# Patient Record
Sex: FEMALE | Race: WHITE | Employment: OTHER | URBAN - METROPOLITAN AREA
[De-identification: names, ages, dates, MRNs, and addresses within clinical notes are randomized per-mention and may not be internally consistent; named-entity substitution may affect disease eponyms.]

---

## 2022-12-06 ENCOUNTER — HOSPITAL ENCOUNTER (OUTPATIENT)
Dept: GENERAL RADIOLOGY | Age: 62
Discharge: HOME | End: 2022-12-06
Payer: COMMERCIAL

## 2022-12-06 ENCOUNTER — HOSPITAL ENCOUNTER (OUTPATIENT)
Dept: WOUND CARE | Age: 62
Discharge: HOME | End: 2022-12-06
Payer: COMMERCIAL

## 2022-12-06 VITALS
HEART RATE: 111 BPM | RESPIRATION RATE: 16 BRPM | HEIGHT: 64 IN | TEMPERATURE: 97 F | DIASTOLIC BLOOD PRESSURE: 80 MMHG | WEIGHT: 246.91 LBS | SYSTOLIC BLOOD PRESSURE: 124 MMHG | BODY MASS INDEX: 42.15 KG/M2

## 2022-12-06 DIAGNOSIS — E11.621 DIABETIC ULCER OF TOE OF RIGHT FOOT ASSOCIATED WITH TYPE 2 DIABETES MELLITUS, WITH FAT LAYER EXPOSED (HCC): Primary | ICD-10-CM

## 2022-12-06 DIAGNOSIS — L97.512 DIABETIC ULCER OF TOE OF RIGHT FOOT ASSOCIATED WITH TYPE 2 DIABETES MELLITUS, WITH FAT LAYER EXPOSED (HCC): Primary | ICD-10-CM

## 2022-12-06 DIAGNOSIS — E11.621 DIABETIC ULCER OF TOE OF RIGHT FOOT ASSOCIATED WITH TYPE 2 DIABETES MELLITUS, WITH FAT LAYER EXPOSED (HCC): ICD-10-CM

## 2022-12-06 DIAGNOSIS — L97.512 DIABETIC ULCER OF TOE OF RIGHT FOOT ASSOCIATED WITH TYPE 2 DIABETES MELLITUS, WITH FAT LAYER EXPOSED (HCC): ICD-10-CM

## 2022-12-06 PROCEDURE — 73630 X-RAY EXAM OF FOOT: CPT

## 2022-12-06 PROCEDURE — 87186 SC STD MICRODIL/AGAR DIL: CPT

## 2022-12-06 PROCEDURE — 87205 SMEAR GRAM STAIN: CPT

## 2022-12-06 PROCEDURE — 99204 OFFICE O/P NEW MOD 45 MIN: CPT

## 2022-12-06 PROCEDURE — 11042 DBRDMT SUBQ TIS 1ST 20SQCM/<: CPT

## 2022-12-06 PROCEDURE — 87070 CULTURE OTHR SPECIMN AEROBIC: CPT

## 2022-12-06 PROCEDURE — 87077 CULTURE AEROBIC IDENTIFY: CPT

## 2022-12-06 RX ORDER — ASCORBIC ACID 500 MG
1000 TABLET ORAL DAILY
COMMUNITY

## 2022-12-06 RX ORDER — OMEGA-3S/DHA/EPA/FISH OIL/D3 300MG-1000
400 CAPSULE ORAL DAILY
COMMUNITY

## 2022-12-06 RX ORDER — ZINC GLUCONATE 50 MG
50 TABLET ORAL DAILY
COMMUNITY

## 2022-12-06 RX ORDER — AMOXICILLIN AND CLAVULANATE POTASSIUM 875; 125 MG/1; MG/1
1 TABLET, FILM COATED ORAL 2 TIMES DAILY
Qty: 28 TABLET | Refills: 0 | Status: SHIPPED | OUTPATIENT
Start: 2022-12-06 | End: 2022-12-20

## 2022-12-06 RX ORDER — MAGNESIUM 30 MG
30 TABLET ORAL DAILY
COMMUNITY

## 2022-12-06 RX ORDER — B-COMPLEX WITH VITAMIN C
1 TABLET ORAL DAILY
COMMUNITY

## 2022-12-06 RX ORDER — LIDOCAINE HYDROCHLORIDE 40 MG/ML
2.5 SOLUTION TOPICAL ONCE
Status: DISCONTINUED | OUTPATIENT
Start: 2022-12-06 | End: 2022-12-07 | Stop reason: HOSPADM

## 2022-12-06 NOTE — DISCHARGE INSTRUCTIONS
215 [facility] Physician Orders and Discharge Instructions  302 [facility]   [address] [address] [facility]  Telephone: [phone]  NAME:  Dano Lovett  YOB: 1960  MEDICAL RECORD NUMBER:  [id number]  DATE: 12/6/22     Return Appointment:  Return Appointment: With Dr Eunice Garrido  in  1 [facility])  [] Return Appointment for a Wound Assessment with the nurse on:     No future appointments. [address]: none  Medically necessary services: [] Skilled Nursing [] PT (Eval & Treat) [] OT (Eval & Treat) [] Social Work [] Dietician  [] Other:    Wound care instructions: If you smoke we ask that you refrain from smoking. Smoking inhibits wounds from healing. When taking antibiotics take the entire prescription as ordered. Do not stop taking until medication is all gone unless otherwise instructed. Exercise as tolerated. Keep weight off wounds and reposition every 2 hours if applicable. Do not get wounds wet in the bath or shower unless otherwise instructed by your physician. If your wound is on your foot or leg, you may purchase a cast bag. Please ask at the pharmacy. Wash hands with soap and water prior to and after every dressing change. [x]Wash wounds with: 0.9% normal saline  [x]Donna wound Topical Treatments: Do not apply lotions, creams, or ointments to the skin around the wound bed unless directed as followed:   Apply around the wound: Nothing         [x]Wound Location: Right Toes   Apply Primary Dressing to wound: Honey gel  Secondary Dressing: 2X2 gauze pad and Conforming roll gauze   Avoid contact of tape with skin if possible. When to change Dressing: Daily    []DME/Wound Dressing Supplies ordered from:  Please note, depending on your insurance coverage, you may have out-of-pocket expenses for these supplies.  If your out-of-pocket cost could be substantial, many companies have financial hardship programs for patients who qualify. If you have any questions about your supplies or your potential out-of-pocket costs, or if you need to place an order for a refill of supplies (typically monthly), please call the company directly. [x]Off Loading(Pressure Reduction) When: Walking  Weight Bearing Status: Partial Weight bearing Right:  Heel and Transfers Only;  Offloading devices: Ankle Foot Orthosis (I.e PRAFO): Right      Dietary:  Important dietary reminders:  1. Increase Protein intake (i.e. Lean meats, fish, eggs, legumes, and yogurt)  2. No added salt  3. If diabetic, follow a diabetic diet and check glucose prior to meals or as instructed by your physician. Dietary Supplements(Take twice a day unless instructed otherwise):  [] Lc Claw  [] 30ml ProStat [] Sandra Kast [] Ensure Max/Premier [] Other:    Your nurse  is:  Maeve Prince     Electronically signed by Valeria Haji RN on 12/6/2022 at 2:24 PM     [address] Information: Should you experience any significant changes in your wound(s) or have questions about your wound care, please contact the [address] [place] [address] at [phone]. We are open from 8:00am - 4:30p Monday, Thursday and Friday; 11:00am - 5pm on Tuesday and CLOSED Wednesday. Please give us 24-48 business hours to return your call. Call your doctor now or seek immediate medical care if:    You have symptoms of infection, such as: Increased pain, swelling, warmth, or redness. Red streaks leading from the area. Pus draining from the area. A fever.          [] Patient unable to sign Discharge Instructions given to ECF/Transportation/POA

## 2022-12-06 NOTE — PROGRESS NOTES
[address]   Progress Note and Procedure Note      Isa Haney  MEDICAL RECORD NUMBER:  [id number]  AGE: 65 y.o. GENDER: female  : 1960  EPISODE DATE:  2022    Subjective:     Chief Complaint   Patient presents with    Wound Check     Initial           HISTORY of PRESENT ILLNESS HPI     Isa Haney is a 65 y.o. female who presents today for wound/ulcer evaluation. History of Wound Context: Patient presents today with a new wound on there right 2nd and 3rd digits. She relates that they have been there for a couple of weeks and her  has been doing daily dressing changes that she had from a previous ulceration. Wound/Ulcer Pain Timing/Severity: none  Quality of pain: N/A  Severity:  0 / 10   Modifying Factors: None  Associated Signs/Symptoms: edema, erythema, and drainage    Ulcer Identification:  Ulcer Type: diabetic    Contributing Factors: diabetes    Acute Wound: N/A    PAST MEDICAL HISTORY        Diagnosis Date    Arthritis     Asthma     exercise/cold induced    Blindness, legal     CVA (cerebral vascular accident) ([facility] [address] 2015    Depression     Diabetes mellitus ([facility] [address]     ION (ischemic optic neuropathy)     developed post-op r/t hypotension    Osteomyelitis ([facility] [address] [facility]     Left foot; finished Vanco. end of .     TIA (transient ischemic attack)        PAST SURGICAL HISTORY    Past Surgical History:   Procedure Laterality Date    BACK SURGERY       SECTION      CHOLECYSTECTOMY      ELBOW SURGERY Left     FOOT SURGERY Left 2014    INCISION AND DRAINAGE MID FOOT LEFT, ENDOSCOPIC GASTROC    FOOT SURGERY Left 2014    LEFT FOOT EXTERNAL FIXATOR REMOVAL        HYSTERECTOMY (CERVIX STATUS UNKNOWN)      ORTHOPEDIC SURGERY      OTHER SURGICAL HISTORY Left 2014    INCISION AND DRAINAGE LEFT FOOT    SOCORRO AND BSO (CERVIX REMOVED)         FAMILY HISTORY    Family History   Problem Relation Age of Onset    Stroke Mother     Stroke Maternal Grandfather     Diabetes Paternal Grandmother        SOCIAL HISTORY    Social History     Tobacco Use    Smoking status: Never    Smokeless tobacco: Never   Substance Use Topics    Alcohol use: No    Drug use: No       ALLERGIES    Allergies   Allergen Reactions    Theophyllines Hives    Apixaban      Foggy and fatigued    Morphine Nausea And Vomiting       MEDICATIONS    Current Outpatient Medications on File Prior to Encounter   Medication Sig Dispense Refill    magnesium 30 MG tablet Take 30 mg by mouth daily 2 capsules      vitamin C (ASCORBIC ACID) 500 MG tablet Take 1,000 mg by mouth daily      zinc gluconate 50 MG tablet Take 50 mg by mouth daily      POTASSIUM CHLORIDE PO Take 1 each by mouth daily      B Complex Vitamins (VITAMIN B COMPLEX) TABS Take 1 each by mouth daily      vitamin D3 (CHOLECALCIFEROL) 10 MCG (400 UNIT) TABS tablet Take 400 Units by mouth daily      aspirin 325 MG tablet Take 325 mg by mouth daily      loratadine (CLARITIN) 10 MG tablet Take 1 tablet by mouth daily       No current facility-administered medications on file prior to encounter. REVIEW OF SYSTEMS  Review of Systems    Pertinent items are noted in HPI. Review of Systems: A 12 point review of symptoms is unremarkable with the exception of the chief complaint. Patient specifically denies nausea, fever, vomiting, chills, shortness of breath, chest pain, abdominal pain, constipation or difficulty urinating. Objective:      /80   Pulse (!) 111   Temp 97 °F (36.1 °C) (Temporal)   Resp 16   Ht 5' 4\" (1.626 m)   Wt 246 lb 14.6 oz (112 kg)   BMI 42.38 kg/m²     Wt Readings from Last 3 Encounters:   12/06/22 246 lb 14.6 oz (112 kg)   12/01/15 221 lb (100.2 kg)   09/29/15 222 lb (100.7 kg)       PHYSICAL EXAM  Physical Exam    Patient has a palpable dorsalis pedis pulse noted bilaterally. Posterior tibial pulses palpable bilaterally. Sparse hair growth is noted.   Brawny pigmentary changes noted on the bilateral lower extremities from chronic venous disease. Multiple varicose veins noted. Protective sensation as tested with a monofilament is absent at all sites tested bilaterally. Ulceration noted on the plantar aspect at the level of the PIPJ on the second and third digits. There is erythema noted surrounding these ulcerations with a scant amount of purulent drainage noted coming from the second. The digits are swollen and warm to the touch. There is no fluctuance or crepitus noted. 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 It is stable with no hypermobility noted. 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 The ulcerations correspond to the AFO plate that was likely digging into her skin the AFO does not fit anymore due to progression of her contracture. Assessment:        Problem List Items Addressed This Visit    None  Visit Diagnoses       Diabetic ulcer of toe of right foot associated with type 2 diabetes mellitus, with fat layer exposed ([address]    -  Primary    Relevant Orders    Basic Metabolic Panel    CBC    C-Reactive Protein    Hemoglobin A1C    Prealbumin    Sedimentation Rate    XR FOOT RIGHT (MIN 3 VIEWS)             Procedure Note  Indications:  Based on my examination of this patient's wound(s)/ulcer(s) today, debridement is required to promote healing and evaluate the wound base. Performed by: Tony Reno DPM    Consent obtained:  Yes    Time out taken:  Yes    Pain Control: Anesthetic  Anesthetic: 4% Lidocaine Liquid Topical       Debridement: Excisional Debridement    Using curette the wound(s)/ulcer(s) was/were debrided down through and including the removal of epidermis, dermis, subcutaneous tissue, and muscle/fascia.         Devitalized Tissue Debrided:  fibrin and slough    Pre Debridement Measurements:  Are located in the [place]  Documentation Flow Sheet    Diabetic/Pressure/Non Pressure Ulcers only:  Ulcer: Diabetic ulcer, muscle necrosis     Wound/Ulcer #: 1 and 2    Post Debridement Measurements:  Wound/Ulcer Descriptions are Pre Debridement except measurements:    Wound 12/06/22 Toe (Comment  which one) Right;Plantar #1 second toe (Active)   Wound Image   12/06/22 1348   Wound Etiology Diabetic Valiente 2 12/06/22 1423   Wound Cleansed Cleansed with saline 12/06/22 1348   Dressing/Treatment Honey gel/honey paste 12/06/22 1444   Offloading for Diabetic Foot Ulcers Offloading ordered;Post op shoe 12/06/22 1348   Wound Length (cm) 1 cm 12/06/22 1348   Wound Width (cm) 1 cm 12/06/22 1348   Wound Depth (cm) 0.1 cm 12/06/22 1348   Wound Surface Area (cm^2) 1 cm^2 12/06/22 1348   Wound Volume (cm^3) 0.1 cm^3 12/06/22 1348   Post-Procedure Length (cm) 1.1 cm 12/06/22 1423   Post-Procedure Width (cm) 1.1 cm 12/06/22 1423   Post-Procedure Depth (cm) 0.3 cm 12/06/22 1423   Post-Procedure Surface Area (cm^2) 1.21 cm^2 12/06/22 1423   Post-Procedure Volume (cm^3) 0.363 cm^3 12/06/22 1423   Tunneling Position ___ O'Clock 0 12/06/22 1348   Undermining Maxium Distance (cm) 0 12/06/22 1348   Wound Assessment Slough;Pink/red 12/06/22 1348   Drainage Amount Small 12/06/22 1348   Drainage Description Yellow 12/06/22 1348   Odor None 12/06/22 1348   Donna-wound Assessment Maceration 12/06/22 1348   Margins Defined edges 12/06/22 1348   Wound Thickness Description not for Pressure Injury Full thickness 12/06/22 1348   Number of days: 0       Wound 12/06/22 Toe (Comment  which one) Plantar;Right #2 third toe (Active)   Wound Image   12/06/22 1348   Wound Etiology Diabetic Valiente 2 12/06/22 1423   Wound Cleansed Cleansed with saline 12/06/22 1348   Dressing/Treatment Honey gel/honey paste 12/06/22 1444   Wound Length (cm) 0.7 cm 12/06/22 1348   Wound Width (cm) 0.5 cm 12/06/22 1348   Wound Depth (cm) 0.5 cm 12/06/22 1348   Wound Surface Area (cm^2) 0.35 cm^2 12/06/22 1348   Wound Volume (cm^3) 0.175 cm^3 12/06/22 1348 Post-Procedure Length (cm) 0.8 cm 12/06/22 1423   Post-Procedure Width (cm) 0.6 cm 12/06/22 1423   Post-Procedure Depth (cm) 0.7 cm 12/06/22 1423   Post-Procedure Surface Area (cm^2) 0.48 cm^2 12/06/22 1423   Post-Procedure Volume (cm^3) 0.336 cm^3 12/06/22 1423   Tunneling Position ___ O'Clock 0 12/06/22 1348   Undermining Maxium Distance (cm) 0 12/06/22 1348   Wound Assessment Pink/red;Slough 12/06/22 1348   Drainage Amount Small 12/06/22 1348   Drainage Description Serosanguinous 12/06/22 1348   Odor None 12/06/22 1348   Donna-wound Assessment Maceration 12/06/22 1348   Margins Defined edges 12/06/22 1348   Wound Thickness Description not for Pressure Injury Full thickness 12/06/22 1348   Number of days: 0          Total Surface Area Debrided:  1.69 sq cm     Estimated Blood Loss:  Minimal    Hemostasis Achieved:  by pressure    Procedural Pain:  0  / 10     Post Procedural Pain:  0 / 10     Response to treatment:  Well tolerated by patient. Plan:   E/M x30 minutes of a new problem of Valiente grade 3 ulceration right second and third digits with cellulitis. A culture was taken from the wound. Patient was started on Augmentin 875 1 p.o. twice daily. Will adjust antibiotics based on culture results. Patient be sent for x-rays and blood work as the ulcerations are concerning for possible osteomyelitis due to the associated cellulitis in depth. Patient was instructed to perform daily dressing changes with Medihoney. Patient was instructed to discontinue utilizing her AFO and will ambulate in a surgical shoe and a walker until ulcerations are healed. Patient will need a new AFO as they are likely the cause of the neuropathic ulcerations    Treatment Note please see attached Discharge Instructions    Written patient dismissal instructions given to patient and signed by patient or POA.         Reappoint 1 week for follow-up    Discharge [address] [facility] Physician Orders and Discharge Instructions  [address]  Telephone: [phone]  NAME:  Almarie Runner  YOB: 1960  MEDICAL RECORD NUMBER:  [id number]  DATE: 12/6/22     Return Appointment:  Return Appointment: With Dr Chandan Morfin  in  1 [facility])  [] Return Appointment for a Wound Assessment with the nurse on:     No future appointments. [address]: none  Medically necessary services: [] Skilled Nursing [] PT (Eval & Treat) [] OT (Eval & Treat) [] Social Work [] Dietician  [] Other:    Wound care instructions: If you smoke we ask that you refrain from smoking. Smoking inhibits wounds from healing. When taking antibiotics take the entire prescription as ordered. Do not stop taking until medication is all gone unless otherwise instructed. Exercise as tolerated. Keep weight off wounds and reposition every 2 hours if applicable. Do not get wounds wet in the bath or shower unless otherwise instructed by your physician. If your wound is on your foot or leg, you may purchase a cast bag. Please ask at the pharmacy. Wash hands with soap and water prior to and after every dressing change. [x]Wash wounds with: 0.9% normal saline  [x]Donna wound Topical Treatments: Do not apply lotions, creams, or ointments to the skin around the wound bed unless directed as followed:   Apply around the wound: Nothing         [x]Wound Location: Right Toes   Apply Primary Dressing to wound: Honey gel  Secondary Dressing: 2X2 gauze pad and Conforming roll gauze   Avoid contact of tape with skin if possible. When to change Dressing: Daily    []DME/Wound Dressing Supplies ordered from:  Please note, depending on your insurance coverage, you may have out-of-pocket expenses for these supplies. If your out-of-pocket cost could be substantial, many companies have financial hardship programs for patients who qualify.  If you have any questions about your supplies or your potential out-of-pocket costs, or if you need to place an order for a refill of supplies (typically monthly), please call the company directly. [x]Off Loading(Pressure Reduction) When: Walking  Weight Bearing Status: Partial Weight bearing Right:  Heel and Transfers Only;  Offloading devices: Ankle Foot Orthosis (I.e PRAFO): Right      Dietary:  Important dietary reminders:  1. Increase Protein intake (i.e. Lean meats, fish, eggs, legumes, and yogurt)  2. No added salt  3. If diabetic, follow a diabetic diet and check glucose prior to meals or as instructed by your physician. Dietary Supplements(Take twice a day unless instructed otherwise):  [] Sean Revering  [] 30ml ProStat [] Luc Fulling [] Ensure Max/Premier [] Other:    Your nurse  is:  Alysia     Electronically signed by Sammy Bateman RN on 12/6/2022 at 2:24 PM     [facility] Information: Should you experience any significant changes in your wound(s) or have questions about your wound care, please contact the [address] [place] [address] at [phone]. We are open from 8:00am - 4:30p Monday, Thursday and Friday; 11:00am - 5pm on Tuesday and CLOSED Wednesday. Please give us 24-48 business hours to return your call. Call your doctor now or seek immediate medical care if:    You have symptoms of infection, such as: Increased pain, swelling, warmth, or redness. Red streaks leading from the area. Pus draining from the area. A fever.          [] Patient unable to sign Discharge Instructions given to ECF/Transportation/POA          Electronically signed by Praneeth Bloom DPM on 12/6/2022 at 3:47 PM

## 2022-12-07 LAB
ANION GAP SERPL CALCULATED.3IONS-SCNC: 19 MMOL/L (ref 3–16)
BUN BLDV-MCNC: 18 MG/DL (ref 7–20)
C-REACTIVE PROTEIN: 9.6 MG/L (ref 0–5.1)
CALCIUM SERPL-MCNC: 9.9 MG/DL (ref 8.3–10.6)
CHLORIDE BLD-SCNC: 102 MMOL/L (ref 99–110)
CO2: 20 MMOL/L (ref 21–32)
CREAT SERPL-MCNC: 0.7 MG/DL (ref 0.6–1.2)
ESTIMATED AVERAGE GLUCOSE: 203 MG/DL
GFR SERPL CREATININE-BSD FRML MDRD: >60 ML/MIN/{1.73_M2}
GLUCOSE BLD-MCNC: 260 MG/DL (ref 70–99)
HBA1C MFR BLD: 8.7 %
HCT VFR BLD CALC: 44.6 % (ref 36–48)
HEMOGLOBIN: 14.4 G/DL (ref 12–16)
MCH RBC QN AUTO: 27.8 PG (ref 26–34)
MCHC RBC AUTO-ENTMCNC: 32.3 G/DL (ref 31–36)
MCV RBC AUTO: 86 FL (ref 80–100)
PDW BLD-RTO: 13.1 % (ref 12.4–15.4)
PLATELET # BLD: 284 K/UL (ref 135–450)
PMV BLD AUTO: 8.4 FL (ref 5–10.5)
POTASSIUM SERPL-SCNC: 4.4 MMOL/L (ref 3.5–5.1)
PREALBUMIN: 20.1 MG/DL (ref 20–40)
RBC # BLD: 5.18 M/UL (ref 4–5.2)
SEDIMENTATION RATE, ERYTHROCYTE: 39 MM/HR (ref 0–30)
SODIUM BLD-SCNC: 141 MMOL/L (ref 136–145)
WBC # BLD: 8 K/UL (ref 4–11)

## 2022-12-08 PROBLEM — L97.512 DIABETIC ULCER OF TOE OF RIGHT FOOT ASSOCIATED WITH TYPE 2 DIABETES MELLITUS, WITH FAT LAYER EXPOSED (HCC): Status: ACTIVE | Noted: 2022-12-08

## 2022-12-08 PROBLEM — E11.621 DIABETIC ULCER OF TOE OF RIGHT FOOT ASSOCIATED WITH TYPE 2 DIABETES MELLITUS, WITH FAT LAYER EXPOSED (HCC): Status: ACTIVE | Noted: 2022-12-08

## 2022-12-08 RX ORDER — CLOBETASOL PROPIONATE 0.5 MG/G
OINTMENT TOPICAL ONCE
OUTPATIENT
Start: 2022-12-08 | End: 2022-12-08

## 2022-12-08 RX ORDER — LIDOCAINE 40 MG/G
CREAM TOPICAL ONCE
OUTPATIENT
Start: 2022-12-08 | End: 2022-12-08

## 2022-12-08 RX ORDER — BACITRACIN ZINC AND POLYMYXIN B SULFATE 500; 1000 [USP'U]/G; [USP'U]/G
OINTMENT TOPICAL ONCE
OUTPATIENT
Start: 2022-12-08 | End: 2022-12-08

## 2022-12-08 RX ORDER — LIDOCAINE HYDROCHLORIDE 20 MG/ML
JELLY TOPICAL ONCE
OUTPATIENT
Start: 2022-12-08 | End: 2022-12-08

## 2022-12-08 RX ORDER — GENTAMICIN SULFATE 1 MG/G
OINTMENT TOPICAL ONCE
OUTPATIENT
Start: 2022-12-08 | End: 2022-12-08

## 2022-12-08 RX ORDER — BACITRACIN, NEOMYCIN, POLYMYXIN B 400; 3.5; 5 [USP'U]/G; MG/G; [USP'U]/G
OINTMENT TOPICAL ONCE
OUTPATIENT
Start: 2022-12-08 | End: 2022-12-08

## 2022-12-08 RX ORDER — LIDOCAINE 50 MG/G
OINTMENT TOPICAL ONCE
OUTPATIENT
Start: 2022-12-08 | End: 2022-12-08

## 2022-12-08 RX ORDER — BETAMETHASONE DIPROPIONATE 0.05 %
OINTMENT (GRAM) TOPICAL ONCE
OUTPATIENT
Start: 2022-12-08 | End: 2022-12-08

## 2022-12-08 RX ORDER — GINSENG 100 MG
CAPSULE ORAL ONCE
OUTPATIENT
Start: 2022-12-08 | End: 2022-12-08

## 2022-12-08 RX ORDER — LIDOCAINE HYDROCHLORIDE 40 MG/ML
SOLUTION TOPICAL ONCE
OUTPATIENT
Start: 2022-12-08 | End: 2022-12-08

## 2022-12-09 LAB
GRAM STAIN RESULT: ABNORMAL
ORGANISM: ABNORMAL
WOUND/ABSCESS: ABNORMAL

## 2022-12-13 ENCOUNTER — HOSPITAL ENCOUNTER (OUTPATIENT)
Dept: WOUND CARE | Age: 62
Discharge: HOME | End: 2022-12-13
Payer: COMMERCIAL

## 2022-12-13 VITALS
RESPIRATION RATE: 18 BRPM | SYSTOLIC BLOOD PRESSURE: 112 MMHG | HEART RATE: 109 BPM | DIASTOLIC BLOOD PRESSURE: 88 MMHG | TEMPERATURE: 97.5 F

## 2022-12-13 DIAGNOSIS — E11.628 DIABETIC INFECTION OF RIGHT FOOT (HCC): Primary | ICD-10-CM

## 2022-12-13 DIAGNOSIS — L97.512 DIABETIC ULCER OF TOE OF RIGHT FOOT ASSOCIATED WITH TYPE 2 DIABETES MELLITUS, WITH FAT LAYER EXPOSED (HCC): ICD-10-CM

## 2022-12-13 DIAGNOSIS — L08.9 DIABETIC INFECTION OF RIGHT FOOT (HCC): Primary | ICD-10-CM

## 2022-12-13 DIAGNOSIS — E11.621 DIABETIC ULCER OF TOE OF RIGHT FOOT ASSOCIATED WITH TYPE 2 DIABETES MELLITUS, WITH FAT LAYER EXPOSED (HCC): ICD-10-CM

## 2022-12-13 PROCEDURE — 6370000000 HC RX 637 (ALT 250 FOR IP): Performed by: PODIATRIST

## 2022-12-13 PROCEDURE — 11043 DBRDMT MUSC&/FSCA 1ST 20/<: CPT

## 2022-12-13 RX ORDER — BETAMETHASONE DIPROPIONATE 0.05 %
OINTMENT (GRAM) TOPICAL ONCE
OUTPATIENT
Start: 2022-12-13 | End: 2022-12-13

## 2022-12-13 RX ORDER — CLOBETASOL PROPIONATE 0.5 MG/G
OINTMENT TOPICAL ONCE
OUTPATIENT
Start: 2022-12-13 | End: 2022-12-13

## 2022-12-13 RX ORDER — BACITRACIN, NEOMYCIN, POLYMYXIN B 400; 3.5; 5 [USP'U]/G; MG/G; [USP'U]/G
OINTMENT TOPICAL ONCE
OUTPATIENT
Start: 2022-12-13 | End: 2022-12-13

## 2022-12-13 RX ORDER — LIDOCAINE 50 MG/G
OINTMENT TOPICAL ONCE
OUTPATIENT
Start: 2022-12-13 | End: 2022-12-13

## 2022-12-13 RX ORDER — LEVOFLOXACIN 500 MG/1
500 TABLET, FILM COATED ORAL DAILY
Qty: 14 TABLET | Refills: 0 | Status: SHIPPED | OUTPATIENT
Start: 2022-12-13 | End: 2022-12-27

## 2022-12-13 RX ORDER — GINSENG 100 MG
CAPSULE ORAL ONCE
OUTPATIENT
Start: 2022-12-13 | End: 2022-12-13

## 2022-12-13 RX ORDER — GENTAMICIN SULFATE 1 MG/G
OINTMENT TOPICAL ONCE
OUTPATIENT
Start: 2022-12-13 | End: 2022-12-13

## 2022-12-13 RX ORDER — LIDOCAINE HYDROCHLORIDE 20 MG/ML
JELLY TOPICAL ONCE
OUTPATIENT
Start: 2022-12-13 | End: 2022-12-13

## 2022-12-13 RX ORDER — LIDOCAINE HYDROCHLORIDE 40 MG/ML
SOLUTION TOPICAL ONCE
Status: COMPLETED | OUTPATIENT
Start: 2022-12-13 | End: 2022-12-13

## 2022-12-13 RX ORDER — BACITRACIN ZINC AND POLYMYXIN B SULFATE 500; 1000 [USP'U]/G; [USP'U]/G
OINTMENT TOPICAL ONCE
OUTPATIENT
Start: 2022-12-13 | End: 2022-12-13

## 2022-12-13 RX ORDER — LIDOCAINE 40 MG/G
CREAM TOPICAL ONCE
OUTPATIENT
Start: 2022-12-13 | End: 2022-12-13

## 2022-12-13 RX ORDER — LIDOCAINE HYDROCHLORIDE 40 MG/ML
SOLUTION TOPICAL ONCE
OUTPATIENT
Start: 2022-12-13 | End: 2022-12-13

## 2022-12-13 RX ADMIN — LIDOCAINE HYDROCHLORIDE: 40 SOLUTION TOPICAL at 14:10

## 2022-12-13 NOTE — DISCHARGE INSTRUCTIONS
215 [facility] Physician Orders and Discharge Instructions  [address] [place] [address] [address] [facility]  Telephone: [phone]  NAME:  Sergei Batista  YOB: 1960  MEDICAL RECORD NUMBER:  [id number]  DATE: 12/13/22     Return Appointment:  Return Appointment: With  Sav Isabel DPM or Esperanza Guido DPM   in  1 [facility])  [] Return Appointment for a Wound Assessment with the nurse on:     No future appointments. [address]: none  Medically necessary services:        [] Skilled Nursing       [] PT (Eval & Treat)   [] OT (Eval & Treat)       [] Social Work           [] Dietician  [] Other:     Wound care instructions: If you smoke we ask that you refrain from smoking. Smoking inhibits wounds from healing. When taking antibiotics take the entire prescription as ordered. Do not stop taking until medication is all gone unless otherwise instructed. Exercise as tolerated. Keep weight off wounds and reposition every 2 hours if applicable. Do not get wounds wet in the bath or shower unless otherwise instructed by your physician. If your wound is on your foot or leg, you may purchase a cast bag. Please ask at the pharmacy. Wash hands with soap and water prior to and after every dressing change. [x]Wash wounds with: 0.9% normal saline  [x]Donna wound Topical Treatments: Do not apply lotions, creams, or ointments to the skin around the wound bed unless directed as followed:   Apply around the wound: Nothing                    [x]Wound Location: Right Toes        Apply Primary Dressing to wound: Honey gel  Secondary Dressing: 2X2 gauze pad and Conforming roll gauze              Avoid contact of tape with skin if possible. When to change Dressing: Daily     []DME/Wound Dressing Supplies ordered from:  Please note, depending on your insurance coverage, you may have out-of-pocket expenses for these supplies.  If your out-of-pocket cost could be substantial, many companies have financial hardship programs for patients who qualify. If you have any questions about your supplies or your potential out-of-pocket costs, or if you need to place an order for a refill of supplies (typically monthly), please call the company directly. [x]Off Loading(Pressure Reduction) When: Walking  Weight Bearing Status: Partial Weight bearing Right:  Heel and Transfers Only;  Offloading devices: Surgical Shoe Right foot, Walker, Scooter    Dietary:  Important dietary reminders:  1. Increase Protein intake (i.e. Lean meats, fish, eggs, legumes, and yogurt)  2. No added salt  3. If diabetic, follow a diabetic diet and check glucose prior to meals or as instructed by your physician. Dietary Supplements(Take twice a day unless instructed otherwise):  [] Liang Crafts  [] 30ml ProStat [] [place] [] Ensure Max/Premier [] Other:    Your nurse  is:  Natan Alba     Electronically signed by Kelly Babb RN on 12/13/2022 at 2:37 PM     215 [facility] Information: Should you experience any significant changes in your wound(s) or have questions about your wound care, please contact the [address] [place] [address] at [phone]. We are open from 8:00am - 4:30p Monday, Thursday and Friday; 11:00am - 5pm on Tuesday and CLOSED Wednesday. Please give us 24-48 business hours to return your call. Call your doctor now or seek immediate medical care if:    You have symptoms of infection, such as: Increased pain, swelling, warmth, or redness. Red streaks leading from the area. Pus draining from the area. A fever.          [] Patient unable to sign Discharge Instructions given to ECF/Transportation/POA

## 2022-12-13 NOTE — PROGRESS NOTES
[address]   Progress Note and Procedure Note      Marcos Maki  MEDICAL RECORD NUMBER:  [id number]  AGE: 65 y.o. GENDER: female  : 1960  EPISODE DATE:  2022    Subjective:     Chief Complaint   Patient presents with    Wound Check     RIGHT FOOT         HISTORY of PRESENT ILLNESS HPI     Marcos Maki is a 65 y.o. female who presents today for wound/ulcer evaluation. History of Wound Context: Patient presents today with a new wound on there right 2nd and 3rd digits. She relates that they have been there for a couple of weeks and her  has been doing daily dressing changes that she had from a previous ulceration. Patient admits she quit taking her antibiotics due to GI upset and she has not been wearing her surgical shoe as recommended. Wound/Ulcer Pain Timing/Severity: none  Quality of pain: N/A  Severity:  0 / 10   Modifying Factors: None  Associated Signs/Symptoms: edema, erythema, and drainage    Ulcer Identification:  Ulcer Type: diabetic    Contributing Factors: diabetes    Acute Wound: N/A    PAST MEDICAL HISTORY        Diagnosis Date    Arthritis     Asthma     exercise/cold induced    Blindness, legal     CVA (cerebral vascular accident) ([address] 2015    Depression     Diabetes mellitus ([address]     ION (ischemic optic neuropathy)     developed post-op r/t hypotension    Osteomyelitis ([address]     Left foot; finished V[place]. end of .     TIA (transient ischemic attack)        PAST SURGICAL HISTORY    Past Surgical History:   Procedure Laterality Date    BACK SURGERY       SECTION      CHOLECYSTECTOMY      ELBOW SURGERY Left     FOOT SURGERY Left 2014    INCISION AND DRAINAGE MID FOOT LEFT, ENDOSCOPIC GASTROC    FOOT SURGERY Left 2014    LEFT FOOT EXTERNAL FIXATOR REMOVAL        HYSTERECTOMY (CERVIX STATUS UNKNOWN)      ORTHOPEDIC SURGERY      OTHER SURGICAL HISTORY Left 2014    INCISION AND DRAINAGE LEFT FOOT SOCORRO AND BSO (CERVIX REMOVED)         FAMILY HISTORY    Family History   Problem Relation Age of Onset    Stroke Mother     Stroke Maternal Grandfather     Diabetes Paternal Grandmother        SOCIAL HISTORY    Social History     Tobacco Use    Smoking status: Never    Smokeless tobacco: Never   Substance Use Topics    Alcohol use: No    Drug use: No       ALLERGIES    Allergies   Allergen Reactions    Theophyllines Hives    Apixaban      Foggy and fatigued    Morphine Nausea And Vomiting       MEDICATIONS    Current Outpatient Medications on File Prior to Encounter   Medication Sig Dispense Refill    POTASSIUM CHLORIDE PO Take 1 each by mouth daily      B Complex Vitamins (VITAMIN B COMPLEX) TABS Take 1 each by mouth daily      vitamin D3 (CHOLECALCIFEROL) 10 MCG (400 UNIT) TABS tablet Take 400 Units by mouth daily      magnesium 30 MG tablet Take 30 mg by mouth daily 2 capsules      vitamin C (ASCORBIC ACID) 500 MG tablet Take 1,000 mg by mouth daily      zinc gluconate 50 MG tablet Take 50 mg by mouth daily      aspirin 325 MG tablet Take 325 mg by mouth daily      loratadine (CLARITIN) 10 MG tablet Take 1 tablet by mouth daily       No current facility-administered medications on file prior to encounter. REVIEW OF SYSTEMS  Review of Systems    Pertinent items are noted in HPI. Review of Systems: A 12 point review of symptoms is unremarkable with the exception of the chief complaint. Patient specifically denies nausea, fever, vomiting, chills, shortness of breath, chest pain, abdominal pain, constipation or difficulty urinating. Objective:      /88   Pulse (!) 109   Temp 97.5 °F (36.4 °C) (Temporal)   Resp 18     Wt Readings from Last 3 Encounters:   12/06/22 246 lb 14.6 oz (112 kg)   12/01/15 221 lb (100.2 kg)   09/29/15 222 lb (100.7 kg)       PHYSICAL EXAM  Physical Exam    Patient has a palpable dorsalis pedis pulse noted bilaterally. Posterior tibial pulses palpable bilaterally.   Sparse hair growth is noted. Brawny pigmentary changes noted on the bilateral lower extremities from chronic venous disease. Multiple varicose veins noted. Protective sensation as tested with a monofilament is absent at all sites tested bilaterally. Ulceration noted on the plantar aspect at the level of the PIPJ on the second and third digits. There is erythema noted surrounding these ulcerations with a scant amount of purulent drainage noted coming from the second. The digits are swollen and warm to the touch. There is no fluctuance or crepitus noted. 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 It is stable with no hypermobility noted. 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 The ulcerations correspond to the AFO plate that was likely digging into her skin the AFO does not fit anymore due to progression of her contracture. Assessment:        Problem List Items Addressed This Visit       Diabetic infection of right foot ([address] - Primary    Relevant Orders    Initiate Outpatient Wound Care Protocol    Diabetic ulcer of toe of right foot associated with type 2 diabetes mellitus, with fat layer exposed ([address]    Relevant Orders    Initiate Outpatient Wound Care Protocol        Procedure Note  Indications:  Based on my examination of this patient's wound(s)/ulcer(s) today, debridement is required to promote healing and evaluate the wound base. Performed by: Letty Canela DPM    Consent obtained:  Yes    Time out taken:  Yes    Pain Control: Anesthetic  Anesthetic: 4% Lidocaine Liquid Topical       Debridement: Excisional Debridement    Using curette the wound(s)/ulcer(s) was/were debrided down through and including the removal of epidermis, dermis, subcutaneous tissue, and muscle/fascia.         Devitalized Tissue Debrided:  fibrin and slough    Pre Debridement Measurements:  Are located in the [place]  Documentation Flow Sheet    Diabetic/Pressure/Non Pressure Ulcers only:  Ulcer: Diabetic ulcer, muscle necrosis     Wound/Ulcer #: 1 and 2    Post Debridement Measurements:  Wound/Ulcer Descriptions are Pre Debridement except measurements:    Wound 12/06/22 Toe (Comment  which one) Right;Plantar #1 second toe (Active)   Wound Image   12/06/22 1348   Wound Etiology Diabetic Valiente 3 12/06/22 1423   Wound Cleansed Cleansed with saline 12/13/22 1357   Dressing/Treatment Honey gel/honey paste 12/13/22 1447   Offloading for Diabetic Foot Ulcers Offloading ordered;Post op shoe 12/13/22 1447   Wound Length (cm) 1 cm 12/13/22 1357   Wound Width (cm) 0.9 cm 12/13/22 1357   Wound Depth (cm) 0.3 cm 12/13/22 1357   Wound Surface Area (cm^2) 0.9 cm^2 12/13/22 1357   Change in Wound Size % (l*w) 10 12/13/22 1357   Wound Volume (cm^3) 0.27 cm^3 12/13/22 1357   Wound Healing % -170 12/13/22 1357   Post-Procedure Length (cm) 1.1 cm 12/06/22 1423   Post-Procedure Width (cm) 1.1 cm 12/06/22 1423   Post-Procedure Depth (cm) 0.3 cm 12/06/22 1423   Post-Procedure Surface Area (cm^2) 1.21 cm^2 12/06/22 1423   Post-Procedure Volume (cm^3) 0.363 cm^3 12/06/22 1423   Distance Tunneling (cm) 0 cm 12/13/22 1357   Tunneling Position ___ O'Clock 0 12/13/22 1357   Undermining Starts ___ O'Clock 0 12/13/22 1357   Undermining Ends___ O'Clock 0 12/13/22 1357   Undermining Maxium Distance (cm) 0 12/13/22 1357   Wound Assessment Exposed structure bone;Pink/red 12/13/22 1357   Drainage Amount Small 12/13/22 1357   Drainage Description Serosanguinous 12/13/22 1357   Odor None 12/13/22 1357   Donna-wound Assessment Intact 12/13/22 1357   Margins Defined edges 12/13/22 1357   Wound Thickness Description not for Pressure Injury Full thickness 12/13/22 1357   Number of days: 7       Wound 12/06/22 Toe (Comment  which one) Plantar;Right #2 third toe (Active)   Wound Image   12/06/22 1348   Wound Etiology Diabetic Valiente 2 12/06/22 1423   Wound Cleansed Cleansed with saline 12/13/22 1357   Dressing/Treatment Honey gel/honey paste 12/13/22 1447   Wound Length (cm) 1 cm 12/13/22 1357   Wound Width (cm) 0.8 cm 12/13/22 1357   Wound Depth (cm) 0.2 cm 12/13/22 1357   Wound Surface Area (cm^2) 0.8 cm^2 12/13/22 1357   Change in Wound Size % (l*w) -128.57 12/13/22 1357   Wound Volume (cm^3) 0.16 cm^3 12/13/22 1357   Wound Healing % 9 12/13/22 1357   Post-Procedure Length (cm) 0.8 cm 12/06/22 1423   Post-Procedure Width (cm) 0.6 cm 12/06/22 1423   Post-Procedure Depth (cm) 0.7 cm 12/06/22 1423   Post-Procedure Surface Area (cm^2) 0.48 cm^2 12/06/22 1423   Post-Procedure Volume (cm^3) 0.336 cm^3 12/06/22 1423   Distance Tunneling (cm) 0 cm 12/13/22 1357   Tunneling Position ___ O'Clock 0 12/13/22 1357   Undermining Starts ___ O'Clock 0 12/13/22 1357   Undermining Ends___ O'Clock 0 12/13/22 1357   Undermining Maxium Distance (cm) 0 12/13/22 1357   Wound Assessment Fibrin;Pink/red 12/13/22 1357   Drainage Amount Small 12/13/22 1357   Drainage Description Serosanguinous 12/13/22 1357   Odor None 12/13/22 1357   Donna-wound Assessment Intact 12/13/22 1357   Margins Defined edges 12/13/22 1357   Wound Thickness Description not for Pressure Injury Full thickness 12/13/22 1357   Number of days: 7          Total Surface Area Debrided:  1.69 sq cm     Estimated Blood Loss:  Minimal    Hemostasis Achieved:  by pressure    Procedural Pain:  0  / 10     Post Procedural Pain:  0 / 10     Response to treatment:  Well tolerated by patient. Plan:   E/M x30 minutes of a new problem of Valiente grade 3 ulceration right second and third digits with cellulitis. Patient did not tolerate Augmentin. Rx levofloxacin No. 14 1 p.o. daily x14 days. X-rays and blood work were reviewed. There was no evidence of osteomyelitis at this time    Patient was [facility] perform [facility]     Patient was instructed to discontinue utilizing her AFO and will ambulate in a surgical shoe and a walker until ulcerations are healed. Patient will need a new AFO as they are likely the cause of the neuropathic ulcerations. Discussed with patient that if she has difficulty ambulating in her surgical shoe she can use her motorized scooter for most of her ambulation. She still needs to wear the surgical shoe to prevent bending at the digits to offload the area    Treatment Note please see attached Discharge Instructions    Written patient dismissal instructions given to patient and signed by patient or POA. Reappoint 1 week for follow-up    Discharge [address] [facility] Physician Orders and Discharge Instructions  [address] [address] [address] [place] [address]  Telephone: [phone]  NAME:  Lorenzo Gould  YOB: 1960  MEDICAL RECORD NUMBER:  [id number]  DATE: 12/13/22     Return Appointment:  Return Appointment: With  Edwige Melton DPM or Ortencia Meigs, DPM   in  [facility])  [] Return Appointment for a Wound Assessment with the nurse on:     No future appointments. [address]: none  Medically necessary services:        [] Skilled Nursing       [] PT (Eval & Treat)   [] OT (Eval & Treat)       [] Social Work           [] Dietician  [] Other:     Wound care instructions: If you smoke we ask that you refrain from smoking. Smoking inhibits wounds from healing. When taking antibiotics take the entire prescription as ordered. Do not stop taking until medication is all gone unless otherwise instructed. Exercise as tolerated. Keep weight off wounds and reposition every 2 hours if applicable. Do not get wounds wet in the bath or shower unless otherwise instructed by your physician. If your wound is on your foot or leg, you may purchase a cast bag. Please ask at the pharmacy. Wash hands with soap and water prior to and after every dressing change.      [x]Wash wounds with: 0.9% normal saline  [x]Donna wound Topical Treatments: Do not apply lotions, creams, or ointments to the skin around the wound bed unless directed as followed:   Apply around the wound: Nothing                    [x]Wound Location: Right Toes        Apply Primary Dressing to wound: Honey gel  Secondary Dressing: 2X2 gauze pad and Conforming roll gauze              Avoid contact of tape with skin if possible. When to change Dressing: Daily     []DME/Wound Dressing Supplies ordered from:  Please note, depending on your insurance coverage, you may have out-of-pocket expenses for these supplies. If your out-of-pocket cost could be substantial, many companies have financial hardship programs for patients who qualify. If you have any questions about your supplies or your potential out-of-pocket costs, or if you need to place an order for a refill of supplies (typically monthly), please call the company directly. [x]Off Loading(Pressure Reduction) When: Walking  Weight Bearing Status: Partial Weight bearing Right:  Heel and Transfers Only;  Offloading devices: Surgical Shoe Right foot, Walker, Scooter    Dietary:  Important dietary reminders:  1. Increase Protein intake (i.e. Lean meats, fish, eggs, legumes, and yogurt)  2. No added salt  3. If diabetic, follow a diabetic diet and check glucose prior to meals or as instructed by your physician. Dietary Supplements(Take twice a day unless instructed otherwise):  [] Naseem Mott  [] 30ml ProStat [] Vladimir Yeager [] Ensure Max/Premier [] Other:    Your nurse  is:  Pretty Pineda     Electronically signed by Vladimir Hylton RN on 12/13/2022 at 2:37 PM     [address] [place] [address] Information: Should you experience any significant changes in your wound(s) or have questions about your wound care, please contact the [address] [place] [address] at [phone]. We are open from 8:00am - 4:30p Monday, Thursday and Friday; 11:00am - 5pm on Tuesday and CLOSED Wednesday.  Please give us 24-48 business hours to

## 2022-12-22 ENCOUNTER — HOSPITAL ENCOUNTER (OUTPATIENT)
Dept: WOUND CARE | Age: 62
Discharge: HOME | End: 2022-12-22
Payer: COMMERCIAL

## 2022-12-22 VITALS
SYSTOLIC BLOOD PRESSURE: 152 MMHG | HEART RATE: 102 BPM | TEMPERATURE: 97.5 F | DIASTOLIC BLOOD PRESSURE: 84 MMHG | RESPIRATION RATE: 18 BRPM

## 2022-12-22 DIAGNOSIS — L97.512 DIABETIC ULCER OF TOE OF RIGHT FOOT ASSOCIATED WITH TYPE 2 DIABETES MELLITUS, WITH FAT LAYER EXPOSED (HCC): ICD-10-CM

## 2022-12-22 DIAGNOSIS — E11.628 DIABETIC INFECTION OF RIGHT FOOT (HCC): Primary | ICD-10-CM

## 2022-12-22 DIAGNOSIS — E11.621 DIABETIC ULCER OF TOE OF RIGHT FOOT ASSOCIATED WITH TYPE 2 DIABETES MELLITUS, WITH FAT LAYER EXPOSED (HCC): ICD-10-CM

## 2022-12-22 DIAGNOSIS — L08.9 DIABETIC INFECTION OF RIGHT FOOT (HCC): Primary | ICD-10-CM

## 2022-12-22 PROCEDURE — 6370000000 HC RX 637 (ALT 250 FOR IP): Performed by: PODIATRIST

## 2022-12-22 RX ORDER — BACITRACIN ZINC AND POLYMYXIN B SULFATE 500; 1000 [USP'U]/G; [USP'U]/G
OINTMENT TOPICAL ONCE
OUTPATIENT
Start: 2022-12-22 | End: 2022-12-22

## 2022-12-22 RX ORDER — LIDOCAINE 40 MG/G
CREAM TOPICAL ONCE
OUTPATIENT
Start: 2022-12-22 | End: 2022-12-22

## 2022-12-22 RX ORDER — BETAMETHASONE DIPROPIONATE 0.05 %
OINTMENT (GRAM) TOPICAL ONCE
OUTPATIENT
Start: 2022-12-22 | End: 2022-12-22

## 2022-12-22 RX ORDER — CLOBETASOL PROPIONATE 0.5 MG/G
OINTMENT TOPICAL ONCE
OUTPATIENT
Start: 2022-12-22 | End: 2022-12-22

## 2022-12-22 RX ORDER — LIDOCAINE HYDROCHLORIDE 20 MG/ML
JELLY TOPICAL ONCE
OUTPATIENT
Start: 2022-12-22 | End: 2022-12-22

## 2022-12-22 RX ORDER — GINSENG 100 MG
CAPSULE ORAL ONCE
OUTPATIENT
Start: 2022-12-22 | End: 2022-12-22

## 2022-12-22 RX ORDER — LIDOCAINE HYDROCHLORIDE 40 MG/ML
SOLUTION TOPICAL ONCE
OUTPATIENT
Start: 2022-12-22 | End: 2022-12-22

## 2022-12-22 RX ORDER — LIDOCAINE 50 MG/G
OINTMENT TOPICAL ONCE
OUTPATIENT
Start: 2022-12-22 | End: 2022-12-22

## 2022-12-22 RX ORDER — LIDOCAINE HYDROCHLORIDE 40 MG/ML
SOLUTION TOPICAL ONCE
Status: COMPLETED | OUTPATIENT
Start: 2022-12-22 | End: 2022-12-22

## 2022-12-22 RX ORDER — BACITRACIN, NEOMYCIN, POLYMYXIN B 400; 3.5; 5 [USP'U]/G; MG/G; [USP'U]/G
OINTMENT TOPICAL ONCE
OUTPATIENT
Start: 2022-12-22 | End: 2022-12-22

## 2022-12-22 RX ORDER — GENTAMICIN SULFATE 1 MG/G
OINTMENT TOPICAL ONCE
OUTPATIENT
Start: 2022-12-22 | End: 2022-12-22

## 2022-12-22 RX ADMIN — COLLAGENASE SANTYL: 250 OINTMENT TOPICAL at 14:03

## 2022-12-22 RX ADMIN — LIDOCAINE HYDROCHLORIDE: 40 SOLUTION TOPICAL at 13:48

## 2022-12-22 NOTE — PROGRESS NOTES
[address]   Progress Note and Procedure Note      Marylu Mejia  MEDICAL RECORD NUMBER:  [id number]  AGE: 65 y.o. GENDER: female  : 1960  EPISODE DATE:  2022    Subjective:     Chief Complaint   Patient presents with    Wound Check     Left foot         HISTORY of PRESENT ILLNESS HPI     Marylu Mejia is a 65 y.o. female who presents today for wound/ulcer evaluation. History of Wound Context: Patient presents today with wounds on there right 2nd and 3rd digits. She relates that they have been there for a couple of weeks and her  has been doing daily dressing changes that she had from a previous ulceration. Patient relates she has been using Santyl as instructed. Wound/Ulcer Pain Timing/Severity: none  Quality of pain: N/A  Severity:  0 / 10   Modifying Factors: None  Associated Signs/Symptoms: edema, erythema, and drainage    Ulcer Identification:  Ulcer Type: diabetic    Contributing Factors: diabetes    Acute Wound: N/A    PAST MEDICAL HISTORY        Diagnosis Date    Arthritis     Asthma     exercise/cold induced    Blindness, legal     CVA (cerebral vascular accident) ([address] 2015    Depression     Diabetes mellitus ([address]     ION (ischemic optic neuropathy)     developed post-op r/t hypotension    Osteomyelitis ([address]     Left foot; finished Vanco. end of .     TIA (transient ischemic attack)        PAST SURGICAL HISTORY    Past Surgical History:   Procedure Laterality Date    BACK SURGERY       SECTION      CHOLECYSTECTOMY      ELBOW SURGERY Left     FOOT SURGERY Left 2014    INCISION AND DRAINAGE MID FOOT LEFT, ENDOSCOPIC GASTROC    FOOT SURGERY Left 2014    LEFT FOOT EXTERNAL FIXATOR REMOVAL        HYSTERECTOMY (CERVIX STATUS UNKNOWN)      ORTHOPEDIC SURGERY      OTHER SURGICAL HISTORY Left 2014    INCISION AND DRAINAGE LEFT FOOT    SOCORRO AND BSO (CERVIX REMOVED)         FAMILY HISTORY    Family History Problem Relation Age of Onset    Stroke Mother     Stroke Maternal Grandfather     Diabetes Paternal Grandmother        SOCIAL HISTORY    Social History     Tobacco Use    Smoking status: Never    Smokeless tobacco: Never   Substance Use Topics    Alcohol use: No    Drug use: No       ALLERGIES    Allergies   Allergen Reactions    Theophyllines Hives    Apixaban      Foggy and fatigued    Morphine Nausea And Vomiting       MEDICATIONS    Current Outpatient Medications on File Prior to Encounter   Medication Sig Dispense Refill    levoFLOXacin (LEVAQUIN) 500 MG tablet Take 1 tablet by mouth daily for 14 days 14 tablet 0    POTASSIUM CHLORIDE PO Take 1 each by mouth daily      B Complex Vitamins (VITAMIN B COMPLEX) TABS Take 1 each by mouth daily      vitamin D3 (CHOLECALCIFEROL) 10 MCG (400 UNIT) TABS tablet Take 400 Units by mouth daily      magnesium 30 MG tablet Take 30 mg by mouth daily 2 capsules      vitamin C (ASCORBIC ACID) 500 MG tablet Take 1,000 mg by mouth daily      zinc gluconate 50 MG tablet Take 50 mg by mouth daily      aspirin 325 MG tablet Take 325 mg by mouth daily      loratadine (CLARITIN) 10 MG tablet Take 1 tablet by mouth daily       No current facility-administered medications on file prior to encounter. REVIEW OF SYSTEMS  Review of Systems    Pertinent items are noted in HPI. Review of Systems: A 12 point review of symptoms is unremarkable with the exception of the chief complaint. Patient specifically denies nausea, fever, vomiting, chills, shortness of breath, chest pain, abdominal pain, constipation or difficulty urinating. Objective:      BP (!) 152/84   Pulse (!) 102   Temp 97.5 °F (36.4 °C) (Temporal)   Resp 18     Wt Readings from Last 3 Encounters:   12/06/22 246 lb 14.6 oz (112 kg)   12/01/15 221 lb (100.2 kg)   09/29/15 222 lb (100.7 kg)       PHYSICAL EXAM  Physical Exam    Patient has a palpable dorsalis pedis pulse noted bilaterally.   Posterior tibial pulses palpable bilaterally. Sparse hair growth is noted. Brawny pigmentary changes noted on the bilateral lower extremities from chronic venous disease. Multiple varicose veins noted. Protective sensation as tested with a monofilament is absent at all sites tested bilaterally. Ulceration noted on the plantar aspect at the level of the PIPJ on the second and third digits. There is erythema noted surrounding these ulcerations with a scant amount of purulent drainage noted coming from the second. The digits are swollen and warm to the touch. There is no fluctuance or crepitus noted. 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 It is stable with no hypermobility noted. 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 The ulcerations correspond to the AFO plate that was likely digging into her skin the AFO does not fit anymore due to progression of her contracture. Assessment:        Problem List Items Addressed This Visit          Endocrine    Diabetic infection of right foot ([address] - Primary    Relevant Orders    Initiate Outpatient Wound Care Protocol    Diabetic ulcer of toe of right foot associated with type 2 diabetes mellitus, with fat layer exposed ([address]    Relevant Orders    Initiate Outpatient Wound Care Protocol        Procedure Note  Indications:  Based on my examination of this patient's wound(s)/ulcer(s) today, debridement is required to promote healing and evaluate the wound base. Performed by: Berta Barreto DPM    Consent obtained:  Yes    Time out taken:  Yes    Pain Control: Anesthetic  Anesthetic: 4% Lidocaine Liquid Topical       Debridement: Excisional Debridement    Using curette the wound(s)/ulcer(s) was/were debrided down through and including the removal of epidermis, dermis, subcutaneous tissue, and muscle/fascia.         Devitalized Tissue Debrided:  fibrin and slough    Pre Debridement Measurements:  Are located in the Wound/Ulcer Documentation Flow Sheet    Diabetic/Pressure/Non Pressure Ulcers only:  Ulcer: Diabetic ulcer, muscle necrosis     Wound/Ulcer #: 1 and 2    Post Debridement Measurements:  Wound/Ulcer Descriptions are Pre Debridement except measurements:    Wound 12/06/22 Toe (Comment  which one) Right;Plantar #1 second toe (Active)   Wound Image   12/06/22 1348   Wound Etiology Diabetic Valiente 3 12/06/22 1423   Wound Cleansed Cleansed with saline 12/22/22 1341   Dressing/Treatment Pharmaceutical agent (see MAR) 12/22/22 1359   Offloading for Diabetic Foot Ulcers Offloading ordered;Post op shoe 12/22/22 1359   Wound Length (cm) 0.4 cm 12/22/22 1341   Wound Width (cm) 0.4 cm 12/22/22 1341   Wound Depth (cm) 0.3 cm 12/22/22 1341   Wound Surface Area (cm^2) 0.16 cm^2 12/22/22 1341   Change in Wound Size % (l*w) 84 12/22/22 1341   Wound Volume (cm^3) 0.048 cm^3 12/22/22 1341   Wound Healing % 52 12/22/22 1341   Post-Procedure Length (cm) 0.5 cm 12/22/22 1359   Post-Procedure Width (cm) 0.5 cm 12/22/22 1359   Post-Procedure Depth (cm) 0.5 cm 12/22/22 1359   Post-Procedure Surface Area (cm^2) 0.25 cm^2 12/22/22 1359   Post-Procedure Volume (cm^3) 0.125 cm^3 12/22/22 1359   Distance Tunneling (cm) 0 cm 12/22/22 1341   Tunneling Position ___ O'Clock 0 12/22/22 1341   Undermining Starts ___ O'Clock 0 12/22/22 1341   Undermining Ends___ O'Clock 0 12/22/22 1341   Undermining Maxium Distance (cm) 0 12/22/22 1341   Wound Assessment Fibrin;Pink/red 12/22/22 1341   Drainage Amount Small 12/22/22 1341   Drainage Description Serosanguinous 12/22/22 1341   Odor None 12/22/22 1341   Donna-wound Assessment Intact 12/22/22 1341   Margins Defined edges 12/22/22 1341   Wound Thickness Description not for Pressure Injury Full thickness 12/22/22 1341   Number of days: 16       Wound 12/06/22 Toe (Comment  which one) Plantar;Right #2 third toe (Active)   Wound Image   12/06/22 1348   Wound Etiology Diabetic Valiente 2 12/06/22 1423   Wound Cleansed Cleansed with saline 12/22/22 1341   Dressing/Treatment Pharmaceutical agent (see MAR) 12/22/22 1359   Wound Length (cm) 0.7 cm 12/22/22 1341   Wound Width (cm) 0.5 cm 12/22/22 1341   Wound Depth (cm) 0.2 cm 12/22/22 1341   Wound Surface Area (cm^2) 0.35 cm^2 12/22/22 1341   Change in Wound Size % (l*w) 0 12/22/22 1341   Wound Volume (cm^3) 0.07 cm^3 12/22/22 1341   Wound Healing % 60 12/22/22 1341   Post-Procedure Length (cm) 0.8 cm 12/22/22 1359   Post-Procedure Width (cm) 0.6 cm 12/22/22 1359   Post-Procedure Depth (cm) 0.4 cm 12/22/22 1359   Post-Procedure Surface Area (cm^2) 0.48 cm^2 12/22/22 1359   Post-Procedure Volume (cm^3) 0.192 cm^3 12/22/22 1359   Distance Tunneling (cm) 0 cm 12/22/22 1341   Tunneling Position ___ O'Clock 0 12/22/22 1341   Undermining Starts ___ O'Clock 0 12/22/22 1341   Undermining Ends___ O'Clock 0 12/22/22 1341   Undermining Maxium Distance (cm) 0 12/22/22 1341   Wound Assessment Fibrin;Pink/red 12/22/22 1341   Drainage Amount Small 12/22/22 1341   Drainage Description Serosanguinous 12/22/22 1341   Odor None 12/22/22 1341   Donna-wound Assessment Intact 12/22/22 1341   Margins Defined edges 12/22/22 1341   Wound Thickness Description not for Pressure Injury Full thickness 12/22/22 1341   Number of days: 16          Total Surface Area Debrided:  0.60 sq cm     Estimated Blood Loss:  Minimal    Hemostasis Achieved:  by pressure    Procedural Pain:  0  / 10     Post Procedural Pain:  0 / 10     Response to treatment:  Well tolerated by patient. Plan:   E/M x30 minutes of a new problem of Valiente grade 3 ulceration right second and third digits with resolved cellulitis. X-rays and blood work were reviewed. There was no evidence of osteomyelitis at this time    Patient was instructed to perform daily dressing changes with Santyl. Patient was instructed to discontinue utilizing her AFO and will ambulate in a surgical shoe and a walker until ulcerations are healed. Patient will need a new AFO as they are likely the cause of the neuropathic ulcerations. Discussed with patient that if she has difficulty ambulating in her surgical shoe she can use her motorized scooter for most of her ambulation. She still needs to wear the surgical shoe to prevent bending at the digits to offload the area    Treatment Note please see attached Discharge Instructions    Written patient dismissal instructions given to patient and signed by patient or POA. Reappoint 1 week for follow-up    Discharge [facility] Physician Orders and Discharge Instructions  [address] [place] [address] [address]  Telephone: [phone]  NAME:  Saeed Eldridge  YOB: 1960  MEDICAL RECORD NUMBER:  [id number]  DATE: 12/22/22     Return Appointment:  Return Appointment: With Dr Favio Fitzptarick  in  1 [facility])  [] Return Appointment for a Wound Assessment with the nurse on:     No future appointments. [address]: none  Medically necessary services: [] Skilled Nursing [] PT (Eval & Treat) [] OT (Eval & Treat) [] Social Work [] Dietician  [] Other:    Wound care instructions: If you smoke we ask that you refrain from smoking. Smoking inhibits wounds from healing. When taking antibiotics take the entire prescription as ordered. Do not stop taking until medication is all gone unless otherwise instructed. Exercise as tolerated. Keep weight off wounds and reposition every 2 hours if applicable. Do not get wounds wet in the bath or shower unless otherwise instructed by your physician. If your wound is on your foot or leg, you may purchase a cast bag. Please ask at the pharmacy. Wash hands with soap and water prior to and after every dressing change.     [x]Wash wounds with: 0.9% normal saline  [x]Donna wound Topical Treatments: Do not apply lotions, creams, or ointments to the skin around the wound bed unless directed as followed:   Apply around the wound: Nothing         [x]Wound Location: right 2,3 toes   Apply Primary Dressing to wound: Honey gel or santyl  Secondary Dressing: 2X2 gauze pad and Conforming roll gauze   Avoid contact of tape with skin if possible. When to change Dressing: Daily      []DME/Wound Dressing Supplies ordered from:  Please note, depending on your insurance coverage, you may have out-of-pocket expenses for these supplies. If your out-of-pocket cost could be substantial, many companies have financial hardship programs for patients who qualify. If you have any questions about your supplies or your potential out-of-pocket costs, or if you need to place an order for a refill of supplies (typically monthly), please call the company directly. [x] Edema Control:  Apply:    Elevate leg(s) above the level of the heart for 30 minutes 4-5 times a day and/or when sitting. Avoid prolonged standing in one place. [x]Off Loading(Pressure Reduction) When: Walking  Weight Bearing Status: Partial Weight bearing Right:   ;  Offloading devices: surgical shoe, walker, scooter    Dietary:  Important dietary reminders:  1. Increase Protein intake (i.e. Lean meats, fish, eggs, legumes, and yogurt)  2. No added salt  3. If diabetic, follow a diabetic diet and check glucose prior to meals or as instructed by your physician. Dietary Supplements(Take twice a day unless instructed otherwise):  [] Bridgman Do  [] 30ml ProStat [] Nancye Karen [] Ensure Max/Premier [] Other:    Your nurse  is:  Dayna Quintanilla       Electronically signed by Carrie Gar RN on 12/22/2022 at 2:02 PM     [address]: Should you experience any significant changes in your wound(s) or have questions about your wound care, please contact the [address] [place] [address] at [phone]. We are open from 8:00am - 4:30p Monday, Thursday and Friday; 11:00am - 5pm on Tuesday and CLOSED Wednesday.  Please give us 24-48 business hours to return your call. Call your doctor now or seek immediate medical care if:    You have symptoms of infection, such as: Increased pain, swelling, warmth, or redness. Red streaks leading from the area. Pus draining from the area. A fever.          [] Patient unable to sign Discharge Instructions given to ECF/Transportation/POA            Electronically signed by Chele Bertrand DPM on 12/22/2022 at 2:17 PM

## 2022-12-22 NOTE — DISCHARGE INSTRUCTIONS
[facility] Physician Orders and Discharge Instructions  [address] [address] [address]  Telephone: [phone]  NAME:  Wilda Thomas  YOB: 1960  MEDICAL RECORD NUMBER:  [id number]  DATE: 12/22/22     Return Appointment:  Return Appointment: With Dr Sada Paniagua  in  1 [facility])  [] Return Appointment for a Wound Assessment with the nurse on:     No future appointments. [address]: none  Medically necessary services: [] Skilled Nursing [] PT (Eval & Treat) [] OT (Eval & Treat) [] Social Work [] Dietician  [] Other:    Wound care instructions: If you smoke we ask that you refrain from smoking. Smoking inhibits wounds from healing. When taking antibiotics take the entire prescription as ordered. Do not stop taking until medication is all gone unless otherwise instructed. Exercise as tolerated. Keep weight off wounds and reposition every 2 hours if applicable. Do not get wounds wet in the bath or shower unless otherwise instructed by your physician. If your wound is on your foot or leg, you may purchase a cast bag. Please ask at the pharmacy. Wash hands with soap and water prior to and after every dressing change. [x]Wash wounds with: 0.9% normal saline  [x]Donna wound Topical Treatments: Do not apply lotions, creams, or ointments to the skin around the wound bed unless directed as followed:   Apply around the wound: Nothing         [x]Wound Location: right 2,3 toes   Apply Primary Dressing to wound: Honey gel or santyl  Secondary Dressing: 2X2 gauze pad and Conforming roll gauze   Avoid contact of tape with skin if possible. When to change Dressing: Daily      []DME/Wound Dressing Supplies ordered from:  Please note, depending on your insurance coverage, you may have out-of-pocket expenses for these supplies.  If your out-of-pocket cost could be substantial, many companies have financial hardship programs for patients who qualify. If you have any questions about your supplies or your potential out-of-pocket costs, or if you need to place an order for a refill of supplies (typically monthly), please call the company directly. [x] Edema Control:  Apply:    Elevate leg(s) above the level of the heart for 30 minutes 4-5 times a day and/or when sitting. Avoid prolonged standing in one place. [x]Off Loading(Pressure Reduction) When: Walking  Weight Bearing Status: Partial Weight bearing Right:   ;  Offloading devices: surgical shoe, walker, scooter    Dietary:  Important dietary reminders:  1. Increase Protein intake (i.e. Lean meats, fish, eggs, legumes, and yogurt)  2. No added salt  3. If diabetic, follow a diabetic diet and check glucose prior to meals or as instructed by your physician. Dietary Supplements(Take twice a day unless instructed otherwise):  [] Gaynelle Donald  [] 30ml ProStat [] Jacquelin Grave [] Ensure Max/Premier [] Other:    Your nurse  is:  Jessenia Heaton       Electronically signed by Tony Gusman RN on 12/22/2022 at 2:02 PM     [address]: Should you experience any significant changes in your wound(s) or have questions about your wound care, please contact the [address] [place] [address] at [phone]. We are open from 8:00am - 4:30p Monday, Thursday and Friday; 11:00am - 5pm on Tuesday and CLOSED Wednesday. Please give us 24-48 business hours to return your call. Call your doctor now or seek immediate medical care if:    You have symptoms of infection, such as: Increased pain, swelling, warmth, or redness. Red streaks leading from the area. Pus draining from the area. A fever.          [] Patient unable to sign Discharge Instructions given to ECF/Transportation/POA

## 2022-12-29 ENCOUNTER — HOSPITAL ENCOUNTER (OUTPATIENT)
Dept: WOUND CARE | Age: 62
Discharge: HOME | End: 2022-12-29
Payer: COMMERCIAL

## 2022-12-29 VITALS
TEMPERATURE: 97.3 F | SYSTOLIC BLOOD PRESSURE: 147 MMHG | RESPIRATION RATE: 18 BRPM | HEART RATE: 106 BPM | DIASTOLIC BLOOD PRESSURE: 85 MMHG

## 2022-12-29 DIAGNOSIS — E11.628 DIABETIC INFECTION OF RIGHT FOOT (HCC): Primary | ICD-10-CM

## 2022-12-29 DIAGNOSIS — E11.621 DIABETIC ULCER OF TOE OF RIGHT FOOT ASSOCIATED WITH TYPE 2 DIABETES MELLITUS, WITH FAT LAYER EXPOSED (HCC): ICD-10-CM

## 2022-12-29 DIAGNOSIS — L08.9 DIABETIC INFECTION OF RIGHT FOOT (HCC): Primary | ICD-10-CM

## 2022-12-29 DIAGNOSIS — L97.512 DIABETIC ULCER OF TOE OF RIGHT FOOT ASSOCIATED WITH TYPE 2 DIABETES MELLITUS, WITH FAT LAYER EXPOSED (HCC): ICD-10-CM

## 2022-12-29 PROCEDURE — 6370000000 HC RX 637 (ALT 250 FOR IP): Performed by: PODIATRIST

## 2022-12-29 PROCEDURE — 11042 DBRDMT SUBQ TIS 1ST 20SQCM/<: CPT

## 2022-12-29 RX ORDER — LIDOCAINE 50 MG/G
OINTMENT TOPICAL ONCE
OUTPATIENT
Start: 2022-12-29 | End: 2022-12-29

## 2022-12-29 RX ORDER — BACITRACIN, NEOMYCIN, POLYMYXIN B 400; 3.5; 5 [USP'U]/G; MG/G; [USP'U]/G
OINTMENT TOPICAL ONCE
OUTPATIENT
Start: 2022-12-29 | End: 2022-12-29

## 2022-12-29 RX ORDER — LIDOCAINE HYDROCHLORIDE 20 MG/ML
JELLY TOPICAL ONCE
OUTPATIENT
Start: 2022-12-29 | End: 2022-12-29

## 2022-12-29 RX ORDER — GINSENG 100 MG
CAPSULE ORAL ONCE
OUTPATIENT
Start: 2022-12-29 | End: 2022-12-29

## 2022-12-29 RX ORDER — CLOBETASOL PROPIONATE 0.5 MG/G
OINTMENT TOPICAL ONCE
OUTPATIENT
Start: 2022-12-29 | End: 2022-12-29

## 2022-12-29 RX ORDER — BETAMETHASONE DIPROPIONATE 0.05 %
OINTMENT (GRAM) TOPICAL ONCE
OUTPATIENT
Start: 2022-12-29 | End: 2022-12-29

## 2022-12-29 RX ORDER — BACITRACIN ZINC AND POLYMYXIN B SULFATE 500; 1000 [USP'U]/G; [USP'U]/G
OINTMENT TOPICAL ONCE
OUTPATIENT
Start: 2022-12-29 | End: 2022-12-29

## 2022-12-29 RX ORDER — GENTAMICIN SULFATE 1 MG/G
OINTMENT TOPICAL ONCE
OUTPATIENT
Start: 2022-12-29 | End: 2022-12-29

## 2022-12-29 RX ORDER — LIDOCAINE HYDROCHLORIDE 40 MG/ML
SOLUTION TOPICAL ONCE
Status: COMPLETED | OUTPATIENT
Start: 2022-12-29 | End: 2022-12-29

## 2022-12-29 RX ORDER — LIDOCAINE 40 MG/G
CREAM TOPICAL ONCE
OUTPATIENT
Start: 2022-12-29 | End: 2022-12-29

## 2022-12-29 RX ORDER — LIDOCAINE HYDROCHLORIDE 40 MG/ML
SOLUTION TOPICAL ONCE
OUTPATIENT
Start: 2022-12-29 | End: 2022-12-29

## 2022-12-29 RX ADMIN — LIDOCAINE HYDROCHLORIDE: 40 SOLUTION TOPICAL at 13:32

## 2022-12-29 RX ADMIN — COLLAGENASE SANTYL: 250 OINTMENT TOPICAL at 13:57

## 2022-12-29 NOTE — DISCHARGE INSTRUCTIONS
[facility] Physician Orders and Discharge Instructions  [address] [address] [address]  Telephone: [phone]  NAME:  Kunal Mckeon  YOB: 1960  MEDICAL RECORD NUMBER:  [id number]  DATE: 12/29/22     Return Appointment:  Return Appointment: With Dr Alvino Ashton  in  1 [facility])  [] Return Appointment for a Wound Assessment with the nurse on:     Future Appointments   Date Time Provider Ruddy Chong   1/5/2023  1:15 PM Chele Bertrand  [address]: none    Medically necessary services: [] Skilled Nursing [] PT (Eval & Treat) [] OT (Eval & Treat) [] Social Work [] Dietician  [] Other:    Wound care instructions: If you smoke we ask that you refrain from smoking. Smoking inhibits wounds from healing. When taking antibiotics take the entire prescription as ordered. Do not stop taking until medication is all gone unless otherwise instructed. Exercise as tolerated. Keep weight off wounds and reposition every 2 hours if applicable. Do not get wounds wet in the bath or shower unless otherwise instructed by your physician. If your wound is on your foot or leg, you may purchase a cast bag. Please ask at the pharmacy. Wash hands with soap and water prior to and after every dressing change. [x]Wash wounds with: 0.9% normal saline  [x]Donna wound Topical Treatments: Do not apply lotions, creams, or ointments to the skin around the wound bed unless directed as followed:   Apply around the wound: Nothing         [x]Wound Location: right 2, 3 toes   Apply Primary Dressing to wound: Pharmaceutical medication: santyl; cover with gauze moistened with saline ; use honey when out of santyl  Secondary Dressing: 2X2 gauze pad and Conforming roll gauze   Avoid contact of tape with skin if possible.   When to change Dressing: Daily      [x] Edema Control:       Elevate leg(s) above the level of the heart for 30 minutes 4-5 times a day and/or when sitting. Avoid prolonged standing in one place. [x]Off Loading(Pressure Reduction) When: Walking  Weight Bearing Status: Partial Weight bearing Right:  Heel and Transfers Only;  Offloading devices: Post op shoe/ Surgical shoe: Right and Walker    Dietary:  Important dietary reminders:  1. Increase Protein intake (i.e. Lean meats, fish, eggs, legumes, and yogurt)  2. No added salt  3. If diabetic, follow a diabetic diet and check glucose prior to meals or as instructed by your physician. Dietary Supplements(Take twice a day unless instructed otherwise):  [] Adalberto Pride  [] 30ml ProStat [] Maria Dolores [place] [] Ensure Max/Premier [] Other:    Your nurse  is:  [address] [place] [address]     Electronically signed by Dionte Prescott RN on 12/29/2022 at 1:40 PM     [facility] Information: Should you experience any significant changes in your wound(s) or have questions about your wound care, please contact the [address] [place] [address] at [phone]. We are open from 8:00am - 4:30p Monday, Thursday and Friday; 11:00am - 5pm on Tuesday and CLOSED Wednesday. Please give us 24-48 business hours to return your call. Call your doctor now or seek immediate medical care if:    You have symptoms of infection, such as: Increased pain, swelling, warmth, or redness. Red streaks leading from the area. Pus draining from the area. A fever.          [] Patient unable to sign Discharge Instructions given to ECF/Transportation/POA

## 2022-12-29 NOTE — PROGRESS NOTES
[address]   Progress Note and Procedure Note      Britt Alejandre  MEDICAL RECORD NUMBER:  [id number]  AGE: 65 y.o. GENDER: female  : 1960  EPISODE DATE:  2022    Subjective:     Chief Complaint   Patient presents with    Wound Check     Left foot         HISTORY of PRESENT ILLNESS HPI     Britt Alejandre is a 65 y.o. female who presents today for wound/ulcer evaluation. History of Wound Context: Patient presents today with wounds on there right 2nd and 3rd digits. She relates that they have been there for a few of weeks and her  has been doing daily dressing changes that she had from a previous ulceration. Patient relates she has been using Santyl as instructed. Wound/Ulcer Pain Timing/Severity: none  Quality of pain: N/A  Severity:  0 / 10   Modifying Factors: None  Associated Signs/Symptoms: edema, erythema, and drainage    Ulcer Identification:  Ulcer Type: diabetic    Contributing Factors: diabetes    Acute Wound: N/A    PAST MEDICAL HISTORY        Diagnosis Date    Arthritis     Asthma     exercise/cold induced    Blindness, legal     CVA (cerebral vascular accident) ([address] 2015    Depression     Diabetes mellitus ([address]     ION (ischemic optic neuropathy)     developed post-op r/t hypotension    Osteomyelitis ([address]     Left foot; finished Vanco. end of .     TIA (transient ischemic attack)        PAST SURGICAL HISTORY    Past Surgical History:   Procedure Laterality Date    BACK SURGERY       SECTION      CHOLECYSTECTOMY      ELBOW SURGERY Left     FOOT SURGERY Left 2014    INCISION AND DRAINAGE MID FOOT LEFT, ENDOSCOPIC GASTROC    FOOT SURGERY Left 2014    LEFT FOOT EXTERNAL FIXATOR REMOVAL        HYSTERECTOMY (CERVIX STATUS UNKNOWN)      ORTHOPEDIC SURGERY      OTHER SURGICAL HISTORY Left 2014    INCISION AND DRAINAGE LEFT FOOT    SOCORRO AND BSO (CERVIX REMOVED)         FAMILY HISTORY    Family History Problem Relation Age of Onset    Stroke Mother     Stroke Maternal Grandfather     Diabetes Paternal Grandmother        SOCIAL HISTORY    Social History     Tobacco Use    Smoking status: Never    Smokeless tobacco: Never   Substance Use Topics    Alcohol use: No    Drug use: No       ALLERGIES    Allergies   Allergen Reactions    Theophyllines Hives    Apixaban      Foggy and fatigued    Morphine Nausea And Vomiting       MEDICATIONS    Current Outpatient Medications on File Prior to Encounter   Medication Sig Dispense Refill    POTASSIUM CHLORIDE PO Take 1 each by mouth daily      B Complex Vitamins (VITAMIN B COMPLEX) TABS Take 1 each by mouth daily      vitamin D3 (CHOLECALCIFEROL) 10 MCG (400 UNIT) TABS tablet Take 400 Units by mouth daily      magnesium 30 MG tablet Take 30 mg by mouth daily 2 capsules      vitamin C (ASCORBIC ACID) 500 MG tablet Take 1,000 mg by mouth daily      zinc gluconate 50 MG tablet Take 50 mg by mouth daily      aspirin 325 MG tablet Take 325 mg by mouth daily      loratadine (CLARITIN) 10 MG tablet Take 1 tablet by mouth daily       No current facility-administered medications on file prior to encounter. REVIEW OF SYSTEMS  Review of Systems    Pertinent items are noted in HPI. Review of Systems: A 12 point review of symptoms is unremarkable with the exception of the chief complaint. Patient specifically denies nausea, fever, vomiting, chills, shortness of breath, chest pain, abdominal pain, constipation or difficulty urinating. Objective:      BP (!) 147/85   Pulse (!) 106   Temp 97.3 °F (36.3 °C) (Temporal)   Resp 18     Wt Readings from Last 3 Encounters:   12/06/22 246 lb 14.6 oz (112 kg)   12/01/15 221 lb (100.2 kg)   09/29/15 222 lb (100.7 kg)       PHYSICAL EXAM  Physical Exam    Patient has a palpable dorsalis pedis pulse noted bilaterally. Posterior tibial pulses palpable bilaterally. Sparse hair growth is noted.   Brawny pigmentary changes noted on the bilateral lower extremities from chronic venous disease. Multiple varicose veins noted. Protective sensation as tested with a monofilament is absent at all sites tested bilaterally. Ulceration noted on the plantar aspect at the level of the PIPJ on the second and third digits. There is erythema noted surrounding these ulcerations with a scant amount of purulent drainage noted coming from the second. The digits are swollen and warm to the touch. There is no fluctuance or crepitus noted. 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 It is stable with no hypermobility noted. 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 The ulcerations correspond to the AFO plate that was likely digging into her skin the AFO does not fit anymore due to progression of her contracture. Assessment:        Problem List Items Addressed This Visit          Endocrine    Diabetic infection of right foot ([facility] - Primary    Relevant Orders    Initiate Outpatient Wound Care Protocol    Diabetic ulcer of toe of right foot associated with type 2 diabetes mellitus, with fat layer exposed ([address]    Relevant Orders    Initiate Outpatient Wound Care Protocol        Procedure Note  Indications:  Based on my examination of this patient's wound(s)/ulcer(s) today, debridement is required to promote healing and evaluate the wound base. Performed by: Lay Levin DPM    Consent obtained:  Yes    Time out taken:  Yes    Pain Control: Anesthetic  Anesthetic: 4% Lidocaine Liquid Topical       Debridement: Excisional Debridement    Using curette the wound(s)/ulcer(s) was/were debrided down through and including the removal of epidermis, dermis, subcutaneous tissue, and muscle/fascia.         Devitalized Tissue Debrided:  fibrin and slough    Pre Debridement Measurements:  Are located in the [facility]  Documentation Flow Sheet    Diabetic/Pressure/Non Pressure Ulcers only:  Ulcer: Diabetic ulcer, muscle necrosis     Wound/Ulcer #: 1 and 2    Post Debridement Measurements:  Wound/Ulcer Descriptions are Pre Debridement except measurements:    Wound 12/06/22 Toe (Comment  which one) Right;Plantar #1 second toe (Active)   Wound Image   12/06/22 1348   Wound Etiology Diabetic Valiente 3 12/06/22 1423   Wound Cleansed Cleansed with saline 12/29/22 1323   Dressing/Treatment Pharmaceutical agent (see MAR) 12/29/22 1356   Offloading for Diabetic Foot Ulcers Offloading ordered;Post op shoe 12/29/22 1356   Wound Length (cm) 0.4 cm 12/29/22 1323   Wound Width (cm) 0.4 cm 12/29/22 1323   Wound Depth (cm) 0.3 cm 12/29/22 1323   Wound Surface Area (cm^2) 0.16 cm^2 12/29/22 1323   Change in Wound Size % (l*w) 84 12/29/22 1323   Wound Volume (cm^3) 0.048 cm^3 12/29/22 1323   Wound Healing % 52 12/29/22 1323   Post-Procedure Length (cm) 5 cm 12/29/22 1337   Post-Procedure Width (cm) 0.5 cm 12/29/22 1337   Post-Procedure Depth (cm) 0.5 cm 12/29/22 1337   Post-Procedure Surface Area (cm^2) 2.5 cm^2 12/29/22 1337   Post-Procedure Volume (cm^3) 1.25 cm^3 12/29/22 1337   Distance Tunneling (cm) 0 cm 12/29/22 1323   Tunneling Position ___ O'Clock 0 12/29/22 1323   Undermining Starts ___ O'Clock 0 12/29/22 1323   Undermining Ends___ O'Clock 0 12/29/22 1323   Undermining Maxium Distance (cm) 0 12/29/22 1323   Wound Assessment Fibrin;Pink/red 12/29/22 1323   Drainage Amount Scant 12/29/22 1323   Drainage Description Serosanguinous 12/29/22 1323   Odor None 12/29/22 1323   Donna-wound Assessment Intact 12/29/22 1323   Margins Defined edges 12/29/22 1323   Wound Thickness Description not for Pressure Injury Full thickness 12/29/22 1323   Number of days: 23       Wound 12/06/22 Toe (Comment  which one) Plantar;Right #2 third toe (Active)   Wound Image   12/06/22 1348   Wound Etiology Diabetic Valiente 2 12/06/22 1423   Wound Cleansed Cleansed with saline 12/29/22 1323   Dressing/Treatment Pharmaceutical agent (see MAR) 12/29/22 1356   Wound Length (cm) 0.7 cm 12/29/22 1323   Wound Width (cm) 0.6 cm 12/29/22 1323   Wound Depth (cm) 0.1 cm 12/29/22 1323   Wound Surface Area (cm^2) 0.42 cm^2 12/29/22 1323   Change in Wound Size % (l*w) -20 12/29/22 1323   Wound Volume (cm^3) 0.042 cm^3 12/29/22 1323   Wound Healing % 76 12/29/22 1323   Post-Procedure Length (cm) 0.8 cm 12/29/22 1337   Post-Procedure Width (cm) 0.7 cm 12/29/22 1337   Post-Procedure Depth (cm) 0.3 cm 12/29/22 1337   Post-Procedure Surface Area (cm^2) 0.56 cm^2 12/29/22 1337   Post-Procedure Volume (cm^3) 0.168 cm^3 12/29/22 1337   Distance Tunneling (cm) 0 cm 12/29/22 1323   Tunneling Position ___ O'Clock 0 12/29/22 1323   Undermining Starts ___ O'Clock 0 12/29/22 1323   Undermining Ends___ O'Clock 0 12/29/22 1323   Undermining Maxium Distance (cm) 0 12/29/22 1323   Wound Assessment Fibrin;Pink/red 12/29/22 1323   Drainage Amount Scant 12/29/22 1323   Drainage Description Serosanguinous 12/29/22 1323   Odor None 12/29/22 1323   Donna-wound Assessment Intact 12/29/22 1323   Margins Defined edges 12/29/22 1323   Wound Thickness Description not for Pressure Injury Full thickness 12/29/22 1323   Number of days: 23          Total Surface Area Debrided:  0.58 sq cm     Estimated Blood Loss:  Minimal    Hemostasis Achieved:  by pressure    Procedural Pain:  0  / 10     Post Procedural Pain:  0 / 10     Response to treatment:  Well tolerated by patient. Plan:   E/M x30 minutes of a new problem of Valiente grade 3 ulceration right second and third digits with resolved cellulitis. X-rays and blood work were reviewed. There was no evidence of osteomyelitis at this time    Patient was instructed to perform daily dressing changes with Santyl. Patient was instructed to discontinue utilizing her AFO and will ambulate in a surgical shoe and a walker until ulcerations are healed.   Patient will need a new AFO as they are likely the cause of the neuropathic around the wound bed unless directed as followed:   Apply around the wound: Nothing         [x]Wound Location: right 2, 3 toes   Apply Primary Dressing to wound: Pharmaceutical medication: santyl; cover with gauze moistened with saline ; use honey when out of santyl  Secondary Dressing: 2X2 gauze pad and Conforming roll gauze   Avoid contact of tape with skin if possible. When to change Dressing: Daily      [x] Edema Control:       Elevate leg(s) above the level of the heart for 30 minutes 4-5 times a day and/or when sitting. Avoid prolonged standing in one place. [x]Off Loading(Pressure Reduction) When: Walking  Weight Bearing Status: Partial Weight bearing Right:  Heel and Transfers Only;  Offloading devices: Post op shoe/ Surgical shoe: Right and Walker    Dietary:  Important dietary reminders:  1. Increase Protein intake (i.e. Lean meats, fish, eggs, legumes, and yogurt)  2. No added salt  3. If diabetic, follow a diabetic diet and check glucose prior to meals or as instructed by your physician. Dietary Supplements(Take twice a day unless instructed otherwise):  [] Bobby Cordon  [] 30ml ProStat [] Parveen Russian [] Ensure Max/Premier [] Other:    Your nurse  is:  [address] [place] [address]     Electronically signed by Lord Rocio RN on 12/29/2022 at 1:40 PM     [facility] Information: Should you experience any significant changes in your wound(s) or have questions about your wound care, please contact the [address] [place] [address] at [phone]. We are open from 8:00am - 4:30p Monday, Thursday and Friday; 11:00am - 5pm on Tuesday and CLOSED Wednesday. Please give us 24-48 business hours to return your call. Call your doctor now or seek immediate medical care if:    You have symptoms of infection, such as: Increased pain, swelling, warmth, or redness. Red streaks leading from the area. Pus draining from the area. A fever.          [] Patient unable to sign Discharge Instructions given to ECF/Transportation/POA          Electronically signed by Berta Barreto DPM on 12/29/2022 at 2:36 PM

## 2023-01-05 ENCOUNTER — HOSPITAL ENCOUNTER (OUTPATIENT)
Dept: WOUND CARE | Age: 63
Discharge: HOME | End: 2023-01-05
Payer: COMMERCIAL

## 2023-01-05 VITALS
BODY MASS INDEX: 42.12 KG/M2 | WEIGHT: 245.37 LBS | RESPIRATION RATE: 18 BRPM | SYSTOLIC BLOOD PRESSURE: 170 MMHG | TEMPERATURE: 97.8 F | DIASTOLIC BLOOD PRESSURE: 82 MMHG | HEART RATE: 98 BPM

## 2023-01-05 DIAGNOSIS — E11.628 DIABETIC INFECTION OF RIGHT FOOT (HCC): Primary | ICD-10-CM

## 2023-01-05 DIAGNOSIS — L97.512 DIABETIC ULCER OF TOE OF RIGHT FOOT ASSOCIATED WITH TYPE 2 DIABETES MELLITUS, WITH FAT LAYER EXPOSED (HCC): ICD-10-CM

## 2023-01-05 DIAGNOSIS — E11.621 DIABETIC ULCER OF TOE OF RIGHT FOOT ASSOCIATED WITH TYPE 2 DIABETES MELLITUS, WITH FAT LAYER EXPOSED (HCC): ICD-10-CM

## 2023-01-05 DIAGNOSIS — L08.9 DIABETIC INFECTION OF RIGHT FOOT (HCC): Primary | ICD-10-CM

## 2023-01-05 PROCEDURE — 6370000000 HC RX 637 (ALT 250 FOR IP): Performed by: PODIATRIST

## 2023-01-05 PROCEDURE — 11042 DBRDMT SUBQ TIS 1ST 20SQCM/<: CPT

## 2023-01-05 RX ORDER — CLOBETASOL PROPIONATE 0.5 MG/G
OINTMENT TOPICAL ONCE
OUTPATIENT
Start: 2023-01-05 | End: 2023-01-05

## 2023-01-05 RX ORDER — LIDOCAINE HYDROCHLORIDE 20 MG/ML
JELLY TOPICAL ONCE
OUTPATIENT
Start: 2023-01-05 | End: 2023-01-05

## 2023-01-05 RX ORDER — LIDOCAINE HYDROCHLORIDE 40 MG/ML
SOLUTION TOPICAL ONCE
OUTPATIENT
Start: 2023-01-05 | End: 2023-01-05

## 2023-01-05 RX ORDER — GENTAMICIN SULFATE 1 MG/G
OINTMENT TOPICAL ONCE
OUTPATIENT
Start: 2023-01-05 | End: 2023-01-05

## 2023-01-05 RX ORDER — LIDOCAINE 40 MG/G
CREAM TOPICAL ONCE
OUTPATIENT
Start: 2023-01-05 | End: 2023-01-05

## 2023-01-05 RX ORDER — BACITRACIN ZINC AND POLYMYXIN B SULFATE 500; 1000 [USP'U]/G; [USP'U]/G
OINTMENT TOPICAL ONCE
OUTPATIENT
Start: 2023-01-05 | End: 2023-01-05

## 2023-01-05 RX ORDER — LIDOCAINE 50 MG/G
OINTMENT TOPICAL ONCE
OUTPATIENT
Start: 2023-01-05 | End: 2023-01-05

## 2023-01-05 RX ORDER — BETAMETHASONE DIPROPIONATE 0.05 %
OINTMENT (GRAM) TOPICAL ONCE
OUTPATIENT
Start: 2023-01-05 | End: 2023-01-05

## 2023-01-05 RX ORDER — GINSENG 100 MG
CAPSULE ORAL ONCE
OUTPATIENT
Start: 2023-01-05 | End: 2023-01-05

## 2023-01-05 RX ORDER — BACITRACIN, NEOMYCIN, POLYMYXIN B 400; 3.5; 5 [USP'U]/G; MG/G; [USP'U]/G
OINTMENT TOPICAL ONCE
OUTPATIENT
Start: 2023-01-05 | End: 2023-01-05

## 2023-01-05 RX ORDER — LIDOCAINE HYDROCHLORIDE 40 MG/ML
SOLUTION TOPICAL ONCE
Status: COMPLETED | OUTPATIENT
Start: 2023-01-05 | End: 2023-01-05

## 2023-01-05 RX ADMIN — LIDOCAINE HYDROCHLORIDE: 40 SOLUTION TOPICAL at 13:47

## 2023-01-05 RX ADMIN — COLLAGENASE SANTYL: 250 OINTMENT TOPICAL at 14:28

## 2023-01-05 NOTE — DISCHARGE INSTRUCTIONS
[facility] Physician Orders and Discharge Instructions  [facility]   [address] [address] [facility]  Telephone: [phone]  NAME:  Moriah Shelby  YOB: 1960  MEDICAL RECORD NUMBER:  [id number]  DATE: 1/5/23     Return Appointment:  Return Appointment: With Dr Juana Workman  in  [facility])  [] Return Appointment for a Wound Assessment with the nurse on:     No future appointments. [address]: none  Medically necessary services: [] Skilled Nursing [] PT (Eval & Treat) [] OT (Eval & Treat) [] Social Work [] Dietician  [] Other:    Wound care instructions: If you smoke we ask that you refrain from smoking. Smoking inhibits wounds from healing. When taking antibiotics take the entire prescription as ordered. Do not stop taking until medication is all gone unless otherwise instructed. Exercise as tolerated. Keep weight off wounds and reposition every 2 hours if applicable. Do not get wounds wet in the bath or shower unless otherwise instructed by your physician. If your wound is on your foot or leg, you may purchase a cast bag. Please ask at the pharmacy. Wash hands with soap and water prior to and after every dressing change. [x]Wash wounds with: 0.9% normal saline  [x]Donna wound Topical Treatments: Do not apply lotions, creams, or ointments to the skin around the wound bed unless directed as followed:   Apply around the wound: Nothing         [x]Wound Location: right 2/3 toes   Apply Primary Dressing to wound: Pharmaceutical medication: Santyl , cover with gauze moistened with saline  Secondary Dressing: 4X4 non woven gauze pad and Conforming roll gauze   Avoid contact of tape with skin if possible. When to change Dressing: Daily        [x] Edema Control:     Elevate leg(s) above the level of the heart for 30 minutes 4-5 times a day and/or when sitting. Avoid prolonged standing in one place.       [x]Off Loading(Pressure Reduction) When: Walking  Weight Bearing Status: Partial Weight bearing Right:  Heel and Transfers Only;  Offloading devices: Post op shoe/ Surgical shoe: Right    Dietary:  Important dietary reminders:  1. Increase Protein intake (i.e. Lean meats, fish, eggs, legumes, and yogurt)  2. No added salt  3. If diabetic, follow a diabetic diet and check glucose prior to meals or as instructed by your physician. Dietary Supplements(Take twice a day unless instructed otherwise):  [] Columbia Borrow  [] 30ml ProStat [] Aura Picking [] Ensure Max/Premier [] Other:    Your nurse  is:  Adelaide Long     Electronically signed by Prachi Pascual RN on 1/5/2023 at 2:04 PM     [address]: Should you experience any significant changes in your wound(s) or have questions about your wound care, please contact the [address] [place] [address] at [phone]. We are open from 8:00am - 4:30p Monday, Thursday and Friday; 11:00am - 5pm on Tuesday and CLOSED Wednesday. Please give us 24-48 business hours to return your call. Call your doctor now or seek immediate medical care if:    You have symptoms of infection, such as: Increased pain, swelling, warmth, or redness. Red streaks leading from the area. Pus draining from the area. A fever.          [] Patient unable to sign Discharge Instructions given to ECF/Transportation/POA

## 2023-01-05 NOTE — PROGRESS NOTES
[address]   Progress Note and Procedure Note      Najma Cassidy  MEDICAL RECORD NUMBER:  [id number]  AGE: 65 y.o. GENDER: female  : 1960  EPISODE DATE:  2023    Subjective:     Chief Complaint   Patient presents with    Wound Check     Right foot         HISTORY of PRESENT ILLNESS HPI     Najma Cassidy is a 65 y.o. female who presents today for wound/ulcer evaluation. History of Wound Context: Patient presents today with wounds on there right 2nd and 3rd digits. She relates that they have been there for a month and her  has been doing daily dressing changes that she had from a previous ulceration. Patient relates she has been using Santyl as instructed. Wound/Ulcer Pain Timing/Severity: none  Quality of pain: N/A  Severity:  0 / 10   Modifying Factors: None  Associated Signs/Symptoms: edema, erythema, and drainage    Ulcer Identification:  Ulcer Type: diabetic    Contributing Factors: diabetes    Acute Wound: N/A    PAST MEDICAL HISTORY        Diagnosis Date    Arthritis     Asthma     exercise/cold induced    Blindness, legal     CVA (cerebral vascular accident) ([address] 2015    Depression     Diabetes mellitus ([address]     ION (ischemic optic neuropathy)     developed post-op r/t hypotension    Osteomyelitis ([address]     Left foot; finished Vanco. end of .     TIA (transient ischemic attack)        PAST SURGICAL HISTORY    Past Surgical History:   Procedure Laterality Date    BACK SURGERY       SECTION      CHOLECYSTECTOMY      ELBOW SURGERY Left     FOOT SURGERY Left 2014    INCISION AND DRAINAGE MID FOOT LEFT, ENDOSCOPIC GASTROC    FOOT SURGERY Left 2014    LEFT FOOT EXTERNAL FIXATOR REMOVAL        HYSTERECTOMY (CERVIX STATUS UNKNOWN)      ORTHOPEDIC SURGERY      OTHER SURGICAL HISTORY Left 2014    INCISION AND DRAINAGE LEFT FOOT    SOCORRO AND BSO (CERVIX REMOVED)         FAMILY HISTORY    Family History   Problem Relation Age of Onset    Stroke Mother     Stroke Maternal Grandfather     Diabetes Paternal Grandmother        SOCIAL HISTORY    Social History     Tobacco Use    Smoking status: Never    Smokeless tobacco: Never   Substance Use Topics    Alcohol use: No    Drug use: No       ALLERGIES    Allergies   Allergen Reactions    Theophyllines Hives    Apixaban      Foggy and fatigued    Morphine Nausea And Vomiting       MEDICATIONS    Current Outpatient Medications on File Prior to Encounter   Medication Sig Dispense Refill    POTASSIUM CHLORIDE PO Take 1 each by mouth daily      B Complex Vitamins (VITAMIN B COMPLEX) TABS Take 1 each by mouth daily      vitamin D3 (CHOLECALCIFEROL) 10 MCG (400 UNIT) TABS tablet Take 400 Units by mouth daily      magnesium 30 MG tablet Take 30 mg by mouth daily 2 capsules      vitamin C (ASCORBIC ACID) 500 MG tablet Take 1,000 mg by mouth daily      zinc gluconate 50 MG tablet Take 50 mg by mouth daily      aspirin 325 MG tablet Take 325 mg by mouth daily      loratadine (CLARITIN) 10 MG tablet Take 1 tablet by mouth daily       No current facility-administered medications on file prior to encounter. REVIEW OF SYSTEMS  Review of Systems    Pertinent items are noted in HPI. Review of Systems: A 12 point review of symptoms is unremarkable with the exception of the chief complaint. Patient specifically denies nausea, fever, vomiting, chills, shortness of breath, chest pain, abdominal pain, constipation or difficulty urinating. Objective:      BP (!) 170/82   Pulse 98   Temp 97.8 °F (36.6 °C) (Temporal)   Resp 18   Wt 245 lb 6 oz (111.3 kg)   BMI 42.12 kg/m²     Wt Readings from Last 3 Encounters:   01/05/23 245 lb 6 oz (111.3 kg)   12/06/22 246 lb 14.6 oz (112 kg)   12/01/15 221 lb (100.2 kg)       PHYSICAL EXAM  Physical Exam    Patient has a palpable dorsalis pedis pulse noted bilaterally. Posterior tibial pulses palpable bilaterally. Sparse hair growth is noted. Brawny pigmentary changes noted on the bilateral lower extremities from chronic venous disease. Multiple varicose veins noted. Protective sensation as tested with a monofilament is absent at all sites tested bilaterally. Ulceration noted on the plantar aspect at the level of the PIPJ on the second and third digits. There is erythema noted surrounding these ulcerations with a scant amount of purulent drainage noted coming from the second. The digits are swollen and warm to the touch. There is no fluctuance or crepitus noted. 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 It is stable with no hypermobility noted. 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 The ulcerations correspond to the AFO plate that was likely digging into her skin the AFO does not fit anymore due to progression of her contracture. Assessment:        Problem List Items Addressed This Visit          Endocrine    Diabetic infection of right foot ([address] - Primary    Relevant Orders    Initiate Outpatient Wound Care Protocol    Diabetic ulcer of toe of right foot associated with type 2 diabetes mellitus, with fat layer exposed ([address]    Relevant Orders    Initiate Outpatient Wound Care Protocol        Procedure Note  Indications:  Based on my examination of this patient's wound(s)/ulcer(s) today, debridement is required to promote healing and evaluate the wound base. Performed by: Rusty White DPM    Consent obtained:  Yes    Time out taken:  Yes    Pain Control: Anesthetic  Anesthetic: 4% Lidocaine Liquid Topical       Debridement: Excisional Debridement    Using curette the wound(s)/ulcer(s) was/were debrided down through and including the removal of epidermis, dermis, and subcutaneous tissue.         Devitalized Tissue Debrided:  fibrin and slough    Pre Debridement Measurements:  Are located in the [facility]  Documentation Flow Sheet    Diabetic/Pressure/Non Pressure Ulcers only:  Ulcer: Diabetic ulcer to the level of the subcutaneous tissue. Wound/Ulcer #: 1 and 2    Post Debridement Measurements:  Wound/Ulcer Descriptions are Pre Debridement except measurements:    Wound 12/06/22 Toe (Comment  which one) Right;Plantar #1 second toe (Active)   Wound Image   01/05/23 1330   Wound Etiology Diabetic Valiente 3 12/06/22 1423   Wound Cleansed Cleansed with saline 01/05/23 1330   Dressing/Treatment Pharmaceutical agent (see MAR) 01/05/23 1427   Offloading for Diabetic Foot Ulcers Offloading ordered;Post op shoe; Other (comment) 01/05/23 1330   Wound Length (cm) 0.2 cm 01/05/23 1330   Wound Width (cm) 0.3 cm 01/05/23 1330   Wound Depth (cm) 0.1 cm 01/05/23 1330   Wound Surface Area (cm^2) 0.06 cm^2 01/05/23 1330   Change in Wound Size % (l*w) 94 01/05/23 1330   Wound Volume (cm^3) 0.006 cm^3 01/05/23 1330   Wound Healing % 94 01/05/23 1330   Post-Procedure Length (cm) 0.3 cm 01/05/23 1402   Post-Procedure Width (cm) 0.4 cm 01/05/23 1402   Post-Procedure Depth (cm) 0.3 cm 01/05/23 1402   Post-Procedure Surface Area (cm^2) 0.12 cm^2 01/05/23 1402   Post-Procedure Volume (cm^3) 0.036 cm^3 01/05/23 1402   Distance Tunneling (cm) 0 cm 12/29/22 1323   Tunneling Position ___ O'Clock 0 01/05/23 1330   Undermining Starts ___ O'Clock 0 01/05/23 1330   Undermining Ends___ O'Clock 0 01/05/23 1330   Undermining Maxium Distance (cm) 0 01/05/23 1330   Wound Assessment Fibrin;Pink/red 01/05/23 1330   Drainage Amount Scant 01/05/23 1330   Drainage Description Serosanguinous 01/05/23 1330   Odor None 01/05/23 1330   Donna-wound Assessment Intact 01/05/23 1330   Margins Defined edges 01/05/23 1330   Wound Thickness Description not for Pressure Injury Full thickness 01/05/23 1330   Number of days: 30       Wound 12/06/22 Toe (Comment  which one) Plantar;Right #2 third toe (Active)   Wound Image   01/05/23 1330   Wound Etiology Diabetic Valiente 2 12/06/22 1423   Wound Cleansed Cleansed with saline 01/05/23 1330   Dressing/Treatment Pharmaceutical agent (see MAR) 01/05/23 1427   Wound Length (cm) 0.6 cm 01/05/23 1330   Wound Width (cm) 0.6 cm 01/05/23 1330   Wound Depth (cm) 0.1 cm 01/05/23 1330   Wound Surface Area (cm^2) 0.36 cm^2 01/05/23 1330   Change in Wound Size % (l*w) -2.86 01/05/23 1330   Wound Volume (cm^3) 0.036 cm^3 01/05/23 1330   Wound Healing % 79 01/05/23 1330   Post-Procedure Length (cm) 0.7 cm 01/05/23 1402   Post-Procedure Width (cm) 0.7 cm 01/05/23 1402   Post-Procedure Depth (cm) 0.3 cm 01/05/23 1402   Post-Procedure Surface Area (cm^2) 0.49 cm^2 01/05/23 1402   Post-Procedure Volume (cm^3) 0.147 cm^3 01/05/23 1402   Distance Tunneling (cm) 0 cm 01/05/23 1330   Tunneling Position ___ O'Clock 0 01/05/23 1330   Undermining Starts ___ O'Clock 0 01/05/23 1330   Undermining Ends___ O'Clock 0 01/05/23 1330   Undermining Maxium Distance (cm) 0 01/05/23 1330   Wound Assessment Fibrin;Pink/red 01/05/23 1330   Drainage Amount Scant 01/05/23 1330   Drainage Description Serosanguinous 01/05/23 1330   Odor None 01/05/23 1330   Donna-wound Assessment Intact 01/05/23 1330   Margins Defined edges 01/05/23 1330   Wound Thickness Description not for Pressure Injury Full thickness 01/05/23 1330   Number of days: 30          Total Surface Area Debrided:  0.61 sq cm     Estimated Blood Loss:  Minimal    Hemostasis Achieved:  by pressure    Procedural Pain:  0  / 10     Post Procedural Pain:  0 / 10     Response to treatment:  Well tolerated by patient. Plan:   E/M x30 minutes of a new problem of Valiente grade 3 ulceration right second and third digits with resolved cellulitis. X-rays and blood work were reviewed. There was no evidence of osteomyelitis at this time    Patient was instructed to perform daily dressing changes with Santyl. Patient was instructed to discontinue utilizing her AFO and will ambulate in a surgical shoe and a walker until ulcerations are healed.   Patient will need a new AFO as they are likely the cause of the neuropathic ulcerations. Discussed with patient that if she has difficulty ambulating in her surgical shoe she can use her motorized scooter for most of her ambulation. She still needs to wear the surgical shoe to prevent bending at the digits to offload the area    Treatment Note please see attached Discharge Instructions    Written patient dismissal instructions given to patient and signed by patient or POA. Reappoint 1 week for follow-up    Discharge [facility] Physician Orders and Discharge Instructions  [address] [address] [address] [address] [facility]. [place] [address]  Telephone: [phone]  NAME:  Benjy March  YOB: 1960  MEDICAL RECORD NUMBER:  [id number]  DATE: 1/5/23     Return Appointment:  Return Appointment: With Dr Aissatou Roberson  in  [facility])  [] Return Appointment for a Wound Assessment with the nurse on:     No future appointments. [address]: none  Medically necessary services: [] Skilled Nursing [] PT (Eval & Treat) [] OT (Eval & Treat) [] Social Work [] Dietician  [] Other:    Wound care instructions: If you smoke we ask that you refrain from smoking. Smoking inhibits wounds from healing. When taking antibiotics take the entire prescription as ordered. Do not stop taking until medication is all gone unless otherwise instructed. Exercise as tolerated. Keep weight off wounds and reposition every 2 hours if applicable. Do not get wounds wet in the bath or shower unless otherwise instructed by your physician. If your wound is on your foot or leg, you may purchase a cast bag. Please ask at the pharmacy. Wash hands with soap and water prior to and after every dressing change.     [x]Wash wounds with: 0.9% normal saline  [x]Donna wound Topical Treatments: Do not apply lotions, creams, or ointments to the skin around the wound bed unless directed as followed: Apply around the wound: Nothing         [x]Wound Location: right 2/3 toes   Apply Primary Dressing to wound: Pharmaceutical medication: Santyl , cover with gauze moistened with saline  Secondary Dressing: 4X4 non woven gauze pad and Conforming roll gauze   Avoid contact of tape with skin if possible. When to change Dressing: Daily        [x] Edema Control:     Elevate leg(s) above the level of the heart for 30 minutes 4-5 times a day and/or when sitting. Avoid prolonged standing in one place. [x]Off Loading(Pressure Reduction) When: Walking  Weight Bearing Status: Partial Weight bearing Right:  Heel and Transfers Only;  Offloading devices: Post op shoe/ Surgical shoe: Right    Dietary:  Important dietary reminders:  1. Increase Protein intake (i.e. Lean meats, fish, eggs, legumes, and yogurt)  2. No added salt  3. If diabetic, follow a diabetic diet and check glucose prior to meals or as instructed by your physician. Dietary Supplements(Take twice a day unless instructed otherwise):  [] De La Cruz Back  [] 30ml ProStat [] Payton Rumble [] Ensure Max/Premier [] Other:    Your nurse  is:  Gema Jones     Electronically signed by Sandor Mendoza RN on 1/5/2023 at 2:04 PM     [address]: Should you experience any significant changes in your wound(s) or have questions about your wound care, please contact the [address] [place] [address] at [phone]. We are open from 8:00am - 4:30p Monday, Thursday and Friday; 11:00am - 5pm on Tuesday and CLOSED Wednesday. Please give us 24-48 business hours to return your call. Call your doctor now or seek immediate medical care if:    You have symptoms of infection, such as: Increased pain, swelling, warmth, or redness. Red streaks leading from the area. Pus draining from the area. A fever.          [] Patient unable to sign Discharge Instructions given to ECF/Transportation/POA          Electronically signed by Diego Pham DPM on 1/5/2023 at 2:46 PM

## 2023-01-10 NOTE — DISCHARGE INSTRUCTIONS
[facility] Physician Orders and Discharge Instructions  [id number] [address] [facility]. [address]  Telephone: [phone]  NAME:  Jayne Desai  YOB: 1960  MEDICAL RECORD NUMBER:  [id number]  DATE: 1/10/23      Please purchase Silipos toe caps. Size is medium and clear. Bring next week. Return Appointment:  Return Appointment: With Alberto Crandall DPM  in  [facility])  [] Return Appointment for a Wound Assessment with the nurse on:     Future Appointments   Date Time Provider Ruddy Chong   1/19/2023  1:15 PM Devyn Bravo DPM [facility]   1/26/2023  1:15 PM Devyn Bravo  [address]: none  Medically necessary services: [] Skilled Nursing [] PT (Eval & Treat) [] OT (Eval & Treat) [] Social Work [] Dietician  [] Other:    Wound care instructions: If you smoke we ask that you refrain from smoking. Smoking inhibits wounds from healing. When taking antibiotics take the entire prescription as ordered. Do not stop taking until medication is all gone unless otherwise instructed. Exercise as tolerated. Keep weight off wounds and reposition every 2 hours if applicable. Do not get wounds wet in the bath or shower unless otherwise instructed by your physician. If your wound is on your foot or leg, you may purchase a cast bag. Please ask at the pharmacy. Wash hands with soap and water prior to and after every dressing change. [x]Wash wounds with: 0.9% normal saline  [x]Donna wound Topical Treatments: Do not apply lotions, creams, or ointments to the skin around the wound bed unless directed as followed:   Apply around the wound: Nothing         [x]Wound Location: right toes   Apply Primary Dressing to wound: Pharmaceutical medication: santyl, cover with gauze moistened with saline. When all of santyl tube is empty may use medihoney and cover with dry gauze.    Secondary Dressing: 4X4 gauze pad and Conforming roll gauze   Avoid contact of tape with skin if possible. When to change Dressing: Daily      [x] Edema Control:     Elevate leg(s) above the level of the heart for 30 minutes 4-5 times a day and/or when sitting. Avoid prolonged standing in one place. [x]Off Loading(Pressure Reduction) When: Walking  Weight Bearing Status: Partial Weight bearing Right:  Heel and Transfers Only;  Offloading devices: Post op shoe/ Surgical shoe: Right    Dietary:  Important dietary reminders:  1. Increase Protein intake (i.e. Lean meats, fish, eggs, legumes, and yogurt)  2. No added salt  3. If diabetic, follow a diabetic diet and check glucose prior to meals or as instructed by your physician. Dietary Supplements(Take twice a day unless instructed otherwise):  [] Thierry Montague  [] 30ml ProStat [] Kade Fofana [] Ensure Max/Premier [] Other:    Your nurse  is:  Batsheva Hughes     Electronically signed by Marylu Hoffman RN on 1/12/2023 at  [facility] Information: Should you experience any significant changes in your wound(s) or have questions about your wound care, please contact the [address] [place] [address] at [phone]. We are open from 8:00am - 4:30p Monday, Thursday and Friday; 11:00am - 5pm on Tuesday and CLOSED Wednesday. Please give us 24-48 business hours to return your call. Call your doctor now or seek immediate medical care if:    You have symptoms of infection, such as: Increased pain, swelling, warmth, or redness. Red streaks leading from the area. Pus draining from the area. A fever.          [] Patient unable to sign Discharge Instructions given to ECF/Transportation/POA

## 2023-01-12 ENCOUNTER — HOSPITAL ENCOUNTER (OUTPATIENT)
Dept: WOUND CARE | Age: 63
Discharge: HOME | End: 2023-01-12
Payer: COMMERCIAL

## 2023-01-12 VITALS
DIASTOLIC BLOOD PRESSURE: 83 MMHG | HEART RATE: 92 BPM | TEMPERATURE: 97.8 F | SYSTOLIC BLOOD PRESSURE: 156 MMHG | RESPIRATION RATE: 18 BRPM

## 2023-01-12 DIAGNOSIS — L08.9 DIABETIC INFECTION OF RIGHT FOOT (HCC): Primary | ICD-10-CM

## 2023-01-12 DIAGNOSIS — E11.628 DIABETIC INFECTION OF RIGHT FOOT (HCC): Primary | ICD-10-CM

## 2023-01-12 DIAGNOSIS — E11.621 DIABETIC ULCER OF TOE OF RIGHT FOOT ASSOCIATED WITH TYPE 2 DIABETES MELLITUS, WITH FAT LAYER EXPOSED (HCC): ICD-10-CM

## 2023-01-12 DIAGNOSIS — L97.512 DIABETIC ULCER OF TOE OF RIGHT FOOT ASSOCIATED WITH TYPE 2 DIABETES MELLITUS, WITH FAT LAYER EXPOSED (HCC): ICD-10-CM

## 2023-01-12 PROCEDURE — 11042 DBRDMT SUBQ TIS 1ST 20SQCM/<: CPT

## 2023-01-12 PROCEDURE — 6370000000 HC RX 637 (ALT 250 FOR IP): Performed by: PODIATRIST

## 2023-01-12 RX ORDER — GINSENG 100 MG
CAPSULE ORAL ONCE
OUTPATIENT
Start: 2023-01-12 | End: 2023-01-12

## 2023-01-12 RX ORDER — LIDOCAINE 40 MG/G
CREAM TOPICAL ONCE
OUTPATIENT
Start: 2023-01-12 | End: 2023-01-12

## 2023-01-12 RX ORDER — LIDOCAINE HYDROCHLORIDE 20 MG/ML
JELLY TOPICAL ONCE
OUTPATIENT
Start: 2023-01-12 | End: 2023-01-12

## 2023-01-12 RX ORDER — LIDOCAINE HYDROCHLORIDE 40 MG/ML
SOLUTION TOPICAL ONCE
Status: COMPLETED | OUTPATIENT
Start: 2023-01-12 | End: 2023-01-12

## 2023-01-12 RX ORDER — GENTAMICIN SULFATE 1 MG/G
OINTMENT TOPICAL ONCE
OUTPATIENT
Start: 2023-01-12 | End: 2023-01-12

## 2023-01-12 RX ORDER — LIDOCAINE HYDROCHLORIDE 40 MG/ML
SOLUTION TOPICAL ONCE
OUTPATIENT
Start: 2023-01-12 | End: 2023-01-12

## 2023-01-12 RX ORDER — CLOBETASOL PROPIONATE 0.5 MG/G
OINTMENT TOPICAL ONCE
OUTPATIENT
Start: 2023-01-12 | End: 2023-01-12

## 2023-01-12 RX ORDER — BACITRACIN ZINC AND POLYMYXIN B SULFATE 500; 1000 [USP'U]/G; [USP'U]/G
OINTMENT TOPICAL ONCE
OUTPATIENT
Start: 2023-01-12 | End: 2023-01-12

## 2023-01-12 RX ORDER — LIDOCAINE 50 MG/G
OINTMENT TOPICAL ONCE
OUTPATIENT
Start: 2023-01-12 | End: 2023-01-12

## 2023-01-12 RX ORDER — BACITRACIN, NEOMYCIN, POLYMYXIN B 400; 3.5; 5 [USP'U]/G; MG/G; [USP'U]/G
OINTMENT TOPICAL ONCE
OUTPATIENT
Start: 2023-01-12 | End: 2023-01-12

## 2023-01-12 RX ORDER — BETAMETHASONE DIPROPIONATE 0.05 %
OINTMENT (GRAM) TOPICAL ONCE
OUTPATIENT
Start: 2023-01-12 | End: 2023-01-12

## 2023-01-12 RX ADMIN — COLLAGENASE SANTYL: 250 OINTMENT TOPICAL at 13:50

## 2023-01-12 RX ADMIN — LIDOCAINE HYDROCHLORIDE: 40 SOLUTION TOPICAL at 13:37

## 2023-01-12 NOTE — PROGRESS NOTES
[address]   Progress Note and Procedure Note      Archana Medina  MEDICAL RECORD NUMBER:  [id number]  AGE: 65 y.o. GENDER: female  : 1960  EPISODE DATE:  2023    Subjective:     Chief Complaint   Patient presents with    Wound Check     Right foot           HISTORY of PRESENT ILLNESS HPI     Archana Medina is a 65 y.o. female who presents today for wound/ulcer evaluation. History of Wound Context: Patient presents today with wounds on there right 2nd and 3rd digits. She relates that they have been there for a month and her  has been doing daily dressing changes that she had from a previous ulceration. Wound/Ulcer Pain Timing/Severity: none  Quality of pain: N/A  Severity:  0 / 10   Modifying Factors: None  Associated Signs/Symptoms: edema, erythema, and drainage    Ulcer Identification:  Ulcer Type: diabetic    Contributing Factors: diabetes    Acute Wound: N/A    PAST MEDICAL HISTORY        Diagnosis Date    Arthritis     Asthma     exercise/cold induced    Blindness, legal     CVA (cerebral vascular accident) ([facility] [facility] 2015    Depression     Diabetes mellitus ([facility] [address]     ION (ischemic optic neuropathy)     developed post-op r/t hypotension    Osteomyelitis ([facility] [address]     Left foot; finished Vanco. end of .     TIA (transient ischemic attack)        PAST SURGICAL HISTORY    Past Surgical History:   Procedure Laterality Date    BACK SURGERY       SECTION      CHOLECYSTECTOMY      ELBOW SURGERY Left     FOOT SURGERY Left 2014    INCISION AND DRAINAGE MID FOOT LEFT, ENDOSCOPIC GASTROC    FOOT SURGERY Left 2014    LEFT FOOT EXTERNAL FIXATOR REMOVAL        HYSTERECTOMY (CERVIX STATUS UNKNOWN)      ORTHOPEDIC SURGERY      OTHER SURGICAL HISTORY Left 2014    INCISION AND DRAINAGE LEFT FOOT    SOCORRO AND BSO (CERVIX REMOVED)         FAMILY HISTORY    Family History   Problem Relation Age of Onset    Stroke Mother     Stroke Maternal Grandfather     Diabetes Paternal Grandmother        SOCIAL HISTORY    Social History     Tobacco Use    Smoking status: Never    Smokeless tobacco: Never   Substance Use Topics    Alcohol use: No    Drug use: No       ALLERGIES    Allergies   Allergen Reactions    Theophyllines Hives    Apixaban      Foggy and fatigued    Metformin Other (See Comments)     Gets angry and mcgrath    Morphine Nausea And Vomiting       MEDICATIONS    Current Outpatient Medications on File Prior to Encounter   Medication Sig Dispense Refill    POTASSIUM CHLORIDE PO Take 1 each by mouth daily      B Complex Vitamins (VITAMIN B COMPLEX) TABS Take 1 each by mouth daily      vitamin D3 (CHOLECALCIFEROL) 10 MCG (400 UNIT) TABS tablet Take 400 Units by mouth daily      magnesium 30 MG tablet Take 30 mg by mouth daily 2 capsules      vitamin C (ASCORBIC ACID) 500 MG tablet Take 1,000 mg by mouth daily      zinc gluconate 50 MG tablet Take 50 mg by mouth daily      aspirin 325 MG tablet Take 325 mg by mouth daily      loratadine (CLARITIN) 10 MG tablet Take 1 tablet by mouth daily       No current facility-administered medications on file prior to encounter. REVIEW OF SYSTEMS  Review of Systems    Pertinent items are noted in HPI. Review of Systems: A 12 point review of symptoms is unremarkable with the exception of the chief complaint. Patient specifically denies nausea, fever, vomiting, chills, shortness of breath, chest pain, abdominal pain, constipation or difficulty urinating. Objective:      BP (!) 156/83   Pulse 92   Temp 97.8 °F (36.6 °C) (Temporal)   Resp 18     Wt Readings from Last 3 Encounters:   01/05/23 245 lb 6 oz (111.3 kg)   12/06/22 246 lb 14.6 oz (112 kg)   12/01/15 221 lb (100.2 kg)       PHYSICAL EXAM  Physical Exam    Patient has a palpable dorsalis pedis pulse noted bilaterally. Posterior tibial pulses palpable bilaterally. Sparse hair growth is noted.   Brawny pigmentary changes noted on the bilateral lower extremities from chronic venous disease. Multiple varicose veins noted. Protective sensation as tested with a monofilament is absent at all sites tested bilaterally. Ulceration noted on the plantar aspect at the level of the PIPJ on the second and third digits. There is erythema noted surrounding these ulcerations with a scant amount of purulent drainage noted coming from the second. The digits are swollen and warm to the touch. There is no fluctuance or crepitus noted. 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 It is stable with no hypermobility noted. 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 The ulcerations correspond to the AFO plate that was likely digging into her skin the AFO does not fit anymore due to progression of her contracture. Assessment:        Problem List Items Addressed This Visit          Endocrine    Diabetic infection of right foot ([address] - Primary    Relevant Orders    Initiate Outpatient Wound Care Protocol    Diabetic ulcer of toe of right foot associated with type 2 diabetes mellitus, with fat layer exposed ([address]    Relevant Orders    Initiate Outpatient Wound Care Protocol        Procedure Note  Indications:  Based on my examination of this patient's wound(s)/ulcer(s) today, debridement is required to promote healing and evaluate the wound base. Performed by: Chaim Brittle, DPM    Consent obtained:  Yes    Time out taken:  Yes    Pain Control: Anesthetic  Anesthetic: 4% Lidocaine Liquid Topical       Debridement: Excisional Debridement    Using curette the wound(s)/ulcer(s) was/were debrided down through and including the removal of epidermis, dermis, and subcutaneous tissue.         Devitalized Tissue Debrided:  fibrin and slough    Pre Debridement Measurements:  Are located in the Wound/Ulcer Documentation Flow Sheet    Diabetic/Pressure/Non Pressure Ulcers only:  Ulcer: Diabetic ulcer to the level of the subcutaneous tissue. Wound/Ulcer #: 1 and 2    Post Debridement Measurements:  Wound/Ulcer Descriptions are Pre Debridement except measurements:    Wound 12/06/22 Toe (Comment  which one) Right;Plantar #1 second toe (Active)   Wound Image   01/05/23 1330   Wound Etiology Diabetic Valiente 3 12/06/22 1423   Wound Cleansed Cleansed with saline 01/12/23 1330   Dressing/Treatment Pharmaceutical agent (see MAR) 01/05/23 1427   Offloading for Diabetic Foot Ulcers Offloading ordered;Post op shoe; Other (comment) 01/05/23 1330   Wound Length (cm) 0.1 cm 01/12/23 1330   Wound Width (cm) 0.1 cm 01/12/23 1330   Wound Depth (cm) 0.1 cm 01/12/23 1330   Wound Surface Area (cm^2) 0.01 cm^2 01/12/23 1330   Change in Wound Size % (l*w) 99 01/12/23 1330   Wound Volume (cm^3) 0.001 cm^3 01/12/23 1330   Wound Healing % 99 01/12/23 1330   Post-Procedure Length (cm) 0.2 cm 01/12/23 1345   Post-Procedure Width (cm) 0.2 cm 01/12/23 1345   Post-Procedure Depth (cm) 0.3 cm 01/12/23 1345   Post-Procedure Surface Area (cm^2) 0.04 cm^2 01/12/23 1345   Post-Procedure Volume (cm^3) 0.012 cm^3 01/12/23 1345   Distance Tunneling (cm) 0 cm 12/29/22 1323   Tunneling Position ___ O'Clock 0 01/05/23 1330   Undermining Starts ___ O'Clock 0 01/05/23 1330   Undermining Ends___ O'Clock 0 01/05/23 1330   Undermining Maxium Distance (cm) 0 01/05/23 1330   Wound Assessment Other (Comment) 01/12/23 1330   Drainage Amount Scant 01/12/23 1330   Drainage Description Serosanguinous 01/12/23 1330   Odor None 01/12/23 1330   Donna-wound Assessment Intact 01/12/23 1330   Margins Defined edges 01/12/23 1330   Wound Thickness Description not for Pressure Injury Full thickness 01/12/23 1330   Number of days: 37       Wound 12/06/22 Toe (Comment  which one) Plantar;Right #2 third toe (Active)   Wound Image   01/05/23 1330   Wound Etiology Diabetic Valiente 2 12/06/22 1423   Wound Cleansed Cleansed with saline 01/12/23 1330   Dressing/Treatment Pharmaceutical agent (see MAR) 01/05/23 1427   Wound Length (cm) 0.4 cm 01/12/23 1330   Wound Width (cm) 0.3 cm 01/12/23 1330   Wound Depth (cm) 0.1 cm 01/12/23 1330   Wound Surface Area (cm^2) 0.12 cm^2 01/12/23 1330   Change in Wound Size % (l*w) 65.71 01/12/23 1330   Wound Volume (cm^3) 0.012 cm^3 01/12/23 1330   Wound Healing % 93 01/12/23 1330   Post-Procedure Length (cm) 0.5 cm 01/12/23 1345   Post-Procedure Width (cm) 0.4 cm 01/12/23 1345   Post-Procedure Depth (cm) 0.3 cm 01/12/23 1345   Post-Procedure Surface Area (cm^2) 0.2 cm^2 01/12/23 1345   Post-Procedure Volume (cm^3) 0.06 cm^3 01/12/23 1345   Distance Tunneling (cm) 0 cm 01/12/23 1330   Tunneling Position ___ O'Clock 0 01/05/23 1330   Undermining Starts ___ O'Clock 0 01/05/23 1330   Undermining Ends___ O'Clock 0 01/05/23 1330   Undermining Maxium Distance (cm) 0 01/12/23 1330   Wound Assessment Fibrin;Pink/red 01/12/23 1330   Drainage Amount Scant 01/12/23 1330   Drainage Description Serosanguinous 01/12/23 1330   Odor None 01/12/23 1330   Donna-wound Assessment Intact 01/12/23 1330   Margins Defined edges 01/12/23 1330   Wound Thickness Description not for Pressure Injury Full thickness 01/12/23 1330   Number of days: 37          Total Surface Area Debrided:  0.24 sq cm     Estimated Blood Loss:  Minimal    Hemostasis Achieved:  by pressure    Procedural Pain:  0  / 10     Post Procedural Pain:  0 / 10     Response to treatment:  Well tolerated by patient. Plan:   E/M x30 minutes of a new problem of Valiente grade 3 ulceration right second and third digits with resolved cellulitis. Patient was instructed to discontinue utilizing her AFO and will ambulate in a surgical shoe and a walker until ulcerations are healed. Patient will need a new AFO as they are likely the cause of the neuropathic ulcerations. Discussed with patient that if she has difficulty ambulating in her surgical shoe she can use her motorized scooter for most of her ambulation.   She still needs to wear the surgical shoe to prevent bending at the digits to offload the area    Treatment Note please see attached Discharge Instructions    Written patient dismissal instructions given to patient and signed by patient or POA. Reappoint 1 week for follow-up    Discharge [facility] Physician Orders and Discharge Instructions  [address] [address] [address] [address] [facility]. [place] [address]  Telephone: [phone]  NAME:  Kunal Mckeon  YOB: 1960  MEDICAL RECORD NUMBER:  [id number]  DATE: 1/10/23      Please purchase Silipos toe caps. Size is medium and clear. Bring next week. Return Appointment:  Return Appointment: With Alvino Ashton DPM  in  [facility])  [] Return Appointment for a Wound Assessment with the nurse on:     Future Appointments   Date Time Provider Ruddy Chong   1/19/2023  1:15 PM Chele Bertrand DPM [facility]   1/26/2023  1:15 PM Chele Bertrand  [facility] [address]: none  Medically necessary services: [] Skilled Nursing [] PT (Eval & Treat) [] OT (Eval & Treat) [] Social Work [] Dietician  [] Other:    Wound care instructions: If you smoke we ask that you refrain from smoking. Smoking inhibits wounds from healing. When taking antibiotics take the entire prescription as ordered. Do not stop taking until medication is all gone unless otherwise instructed. Exercise as tolerated. Keep weight off wounds and reposition every 2 hours if applicable. Do not get wounds wet in the bath or shower unless otherwise instructed by your physician. If your wound is on your foot or leg, you may purchase a cast bag. Please ask at the pharmacy. Wash hands with soap and water prior to and after every dressing change.     [x]Wash wounds with: 0.9% normal saline  [x]Donna wound Topical Treatments: Do not apply lotions, creams, or ointments to the skin around the wound bed unless directed as followed:   Apply around the wound: Nothing         [x]Wound Location: right toes   Apply Primary Dressing to wound: Pharmaceutical medication: santyl, cover with gauze moistened with saline. When all of santyl tube is empty may use medihoney and cover with dry gauze. Secondary Dressing: 4X4 gauze pad and Conforming roll gauze   Avoid contact of tape with skin if possible. When to change Dressing: Daily      [x] Edema Control:     Elevate leg(s) above the level of the heart for 30 minutes 4-5 times a day and/or when sitting. Avoid prolonged standing in one place. [x]Off Loading(Pressure Reduction) When: Walking  Weight Bearing Status: Partial Weight bearing Right:  Heel and Transfers Only;  Offloading devices: Post op shoe/ Surgical shoe: Right    Dietary:  Important dietary reminders:  1. Increase Protein intake (i.e. Lean meats, fish, eggs, legumes, and yogurt)  2. No added salt  3. If diabetic, follow a diabetic diet and check glucose prior to meals or as instructed by your physician. Dietary Supplements(Take twice a day unless instructed otherwise):  [] Rodriguez Olivas  [] 30ml ProStat [] Margret Metz [] Ensure Max/Premier [] Other:    Your nurse  is:  Garcia Madrid     Electronically signed by Carlos Fernandez RN on 1/12/2023 at  [facility] Information: Should you experience any significant changes in your wound(s) or have questions about your wound care, please contact the [address] [place] [address] at [phone]. We are open from 8:00am - 4:30p Monday, Thursday and Friday; 11:00am - 5pm on Tuesday and CLOSED Wednesday. Please give us 24-48 business hours to return your call. Call your doctor now or seek immediate medical care if:    You have symptoms of infection, such as: Increased pain, swelling, warmth, or redness. Red streaks leading from the area. Pus draining from the area. A fever.          [] Patient unable to sign Discharge Instructions given to ECF/Transportation/POA          Electronically signed by Ira Lowe DPM on 1/12/2023 at 2:01 PM

## 2023-01-19 ENCOUNTER — HOSPITAL ENCOUNTER (OUTPATIENT)
Dept: WOUND CARE | Age: 63
Discharge: HOME | End: 2023-01-19
Payer: COMMERCIAL

## 2023-01-19 VITALS
HEART RATE: 96 BPM | TEMPERATURE: 97.5 F | SYSTOLIC BLOOD PRESSURE: 123 MMHG | RESPIRATION RATE: 18 BRPM | DIASTOLIC BLOOD PRESSURE: 86 MMHG

## 2023-01-19 DIAGNOSIS — L08.9 DIABETIC INFECTION OF RIGHT FOOT (HCC): Primary | ICD-10-CM

## 2023-01-19 DIAGNOSIS — E11.621 DIABETIC ULCER OF TOE OF RIGHT FOOT ASSOCIATED WITH TYPE 2 DIABETES MELLITUS, WITH FAT LAYER EXPOSED (HCC): ICD-10-CM

## 2023-01-19 DIAGNOSIS — L97.512 DIABETIC ULCER OF TOE OF RIGHT FOOT ASSOCIATED WITH TYPE 2 DIABETES MELLITUS, WITH FAT LAYER EXPOSED (HCC): ICD-10-CM

## 2023-01-19 DIAGNOSIS — E11.628 DIABETIC INFECTION OF RIGHT FOOT (HCC): Primary | ICD-10-CM

## 2023-01-19 PROCEDURE — 11042 DBRDMT SUBQ TIS 1ST 20SQCM/<: CPT

## 2023-01-19 PROCEDURE — 6370000000 HC RX 637 (ALT 250 FOR IP): Performed by: PODIATRIST

## 2023-01-19 RX ORDER — LIDOCAINE 50 MG/G
OINTMENT TOPICAL ONCE
OUTPATIENT
Start: 2023-01-19 | End: 2023-01-19

## 2023-01-19 RX ORDER — CLOBETASOL PROPIONATE 0.5 MG/G
OINTMENT TOPICAL ONCE
OUTPATIENT
Start: 2023-01-19 | End: 2023-01-19

## 2023-01-19 RX ORDER — GENTAMICIN SULFATE 1 MG/G
OINTMENT TOPICAL ONCE
OUTPATIENT
Start: 2023-01-19 | End: 2023-01-19

## 2023-01-19 RX ORDER — LIDOCAINE HYDROCHLORIDE 40 MG/ML
SOLUTION TOPICAL ONCE
OUTPATIENT
Start: 2023-01-19 | End: 2023-01-19

## 2023-01-19 RX ORDER — LIDOCAINE HYDROCHLORIDE 40 MG/ML
SOLUTION TOPICAL ONCE
Status: COMPLETED | OUTPATIENT
Start: 2023-01-19 | End: 2023-01-19

## 2023-01-19 RX ORDER — BACITRACIN ZINC AND POLYMYXIN B SULFATE 500; 1000 [USP'U]/G; [USP'U]/G
OINTMENT TOPICAL ONCE
OUTPATIENT
Start: 2023-01-19 | End: 2023-01-19

## 2023-01-19 RX ORDER — LIDOCAINE HYDROCHLORIDE 20 MG/ML
JELLY TOPICAL ONCE
OUTPATIENT
Start: 2023-01-19 | End: 2023-01-19

## 2023-01-19 RX ORDER — BETAMETHASONE DIPROPIONATE 0.05 %
OINTMENT (GRAM) TOPICAL ONCE
OUTPATIENT
Start: 2023-01-19 | End: 2023-01-19

## 2023-01-19 RX ORDER — BACITRACIN, NEOMYCIN, POLYMYXIN B 400; 3.5; 5 [USP'U]/G; MG/G; [USP'U]/G
OINTMENT TOPICAL ONCE
OUTPATIENT
Start: 2023-01-19 | End: 2023-01-19

## 2023-01-19 RX ORDER — GINSENG 100 MG
CAPSULE ORAL ONCE
OUTPATIENT
Start: 2023-01-19 | End: 2023-01-19

## 2023-01-19 RX ORDER — LIDOCAINE 40 MG/G
CREAM TOPICAL ONCE
OUTPATIENT
Start: 2023-01-19 | End: 2023-01-19

## 2023-01-19 RX ADMIN — LIDOCAINE HYDROCHLORIDE: 40 SOLUTION TOPICAL at 13:51

## 2023-01-19 NOTE — DISCHARGE INSTRUCTIONS
. [address] [facility] Physician Orders and Discharge Instructions  [address] [facility]   [address] [address] [facility]. [address]  Telephone: [phone]  NAME:  Edy Jacobs  YOB: 1960  MEDICAL RECORD NUMBER:  [id number]  DATE: 1/19/23     Return Appointment:  Return Appointment: With Krista Rossi DPM  in  1 [facility])  [] Return Appointment for a Wound Assessment with the nurse on:     Future Appointments   Date Time Provider Ruddy Chong   1/26/2023  1:15 PM Edie Acosta  [facility] [address]: none  Medically necessary services: [] Skilled Nursing [] PT (Eval & Treat) [] OT (Eval & Treat) [] Social Work [] Dietician  [] Other:    Wound care instructions: If you smoke we ask that you refrain from smoking. Smoking inhibits wounds from healing. When taking antibiotics take the entire prescription as ordered. Do not stop taking until medication is all gone unless otherwise instructed. Exercise as tolerated. Keep weight off wounds and reposition every 2 hours if applicable. Do not get wounds wet in the bath or shower unless otherwise instructed by your physician. If your wound is on your foot or leg, you may purchase a cast bag. Please ask at the pharmacy. Wash hands with soap and water prior to and after every dressing change. [x]Wash wounds with: 0.9% normal saline  [x]Donna wound Topical Treatments: Do not apply lotions, creams, or ointments to the skin around the wound bed unless directed as followed:   Apply around the wound: Nothing         [x]Wound Location: right toes   Apply Primary Dressing to wound: Honey gel  Secondary Dressing: 2X2 gauze pad, Conforming roll gauze, and or bandaid   Avoid contact of tape with skin if possible. When to change Dressing: Daily            [x] Edema Control:     Elevate leg(s) above the level of the heart for 30 minutes 4-5 times a day and/or when sitting.   Avoid prolonged standing in one place. [x]Off Loading(Pressure Reduction) When:   Weight Bearing Status: Partial Weight bearing Right:  Heel and Transfers Only;  Offloading devices: Post op shoe/ Surgical shoe: Right and scooter      Dietary:  Important dietary reminders:  1. Increase Protein intake (i.e. Lean meats, fish, eggs, legumes, and yogurt)  2. No added salt  3. If diabetic, follow a diabetic diet and check glucose prior to meals or as instructed by your physician. Dietary Supplements(Take twice a day unless instructed otherwise):  [] Geofm Roni  [] 30ml ProStat [] Jones Yefri [] Ensure Max/Premier [] Other:    Your nurse  is:  Iman Buitrago     Electronically signed by Teresa Graham RN on 1/19/2023 at 2:02 PM     [address] [facility] [address] Information: Should you experience any significant changes in your wound(s) or have questions about your wound care, please contact the [address] [place] [address] at [phone]. We are open from 8:00am - 4:30p Monday, Thursday and Friday; 11:00am - 5pm on Tuesday and CLOSED Wednesday. Please give us 24-48 business hours to return your call. Call your doctor now or seek immediate medical care if:    You have symptoms of infection, such as: Increased pain, swelling, warmth, or redness. Red streaks leading from the area. Pus draining from the area. A fever.          [] Patient unable to sign Discharge Instructions given to ECF/Transportation/POA

## 2023-01-19 NOTE — PROGRESS NOTES
[address]   Progress Note and Procedure Note      Salvatore Encarnacion  MEDICAL RECORD NUMBER:  [id number]  AGE: 65 y.o. GENDER: female  : 1960  EPISODE DATE:  2023    Subjective:     Chief Complaint   Patient presents with    Wound Check     Right foot         HISTORY of PRESENT ILLNESS HPI     Salvatore Encarnacion is a 65 y.o. female who presents today for wound/ulcer evaluation. History of Wound Context: Patient presents today with wounds on there right 2nd and 3rd digits. She relates that they have been there for over a month. She is out of Santyl and started using [facility] on the wounds. She thinks the ulcers are improved. Wound/Ulcer Pain Timing/Severity: none  Quality of pain: N/A  Severity:  0 / 10   Modifying Factors: None  Associated Signs/Symptoms: edema, erythema, and drainage    Ulcer Identification:  Ulcer Type: diabetic    Contributing Factors: diabetes    Acute Wound: N/A    PAST MEDICAL HISTORY        Diagnosis Date    Arthritis     Asthma     exercise/cold induced    Blindness, legal     CVA (cerebral vascular accident) ([address] 2015    Depression     Diabetes mellitus ([address]     ION (ischemic optic neuropathy)     developed post-op r/t hypotension    Osteomyelitis ([address]     Left foot; finished Vanco. end of .     TIA (transient ischemic attack)        PAST SURGICAL HISTORY    Past Surgical History:   Procedure Laterality Date    BACK SURGERY       SECTION      CHOLECYSTECTOMY      ELBOW SURGERY Left     FOOT SURGERY Left 2014    INCISION AND DRAINAGE MID FOOT LEFT, ENDOSCOPIC GASTROC    FOOT SURGERY Left 2014    LEFT FOOT EXTERNAL FIXATOR REMOVAL        HYSTERECTOMY (CERVIX STATUS UNKNOWN)      ORTHOPEDIC SURGERY      OTHER SURGICAL HISTORY Left 2014    INCISION AND DRAINAGE LEFT FOOT    SOCORRO AND BSO (CERVIX REMOVED)         FAMILY HISTORY    Family History   Problem Relation Age of Onset    Stroke Mother     Stroke Maternal Grandfather     Diabetes Paternal Grandmother        SOCIAL HISTORY    Social History     Tobacco Use    Smoking status: Never    Smokeless tobacco: Never   Substance Use Topics    Alcohol use: No    Drug use: No       ALLERGIES    Allergies   Allergen Reactions    Theophyllines Hives    Apixaban      Foggy and fatigued    Metformin Other (See Comments)     Gets angry and mcgrath    Morphine Nausea And Vomiting       MEDICATIONS    Current Outpatient Medications on File Prior to Encounter   Medication Sig Dispense Refill    POTASSIUM CHLORIDE PO Take 1 each by mouth daily      B Complex Vitamins (VITAMIN B COMPLEX) TABS Take 1 each by mouth daily      vitamin D3 (CHOLECALCIFEROL) 10 MCG (400 UNIT) TABS tablet Take 400 Units by mouth daily      magnesium 30 MG tablet Take 30 mg by mouth daily 2 capsules      vitamin C (ASCORBIC ACID) 500 MG tablet Take 1,000 mg by mouth daily      zinc gluconate 50 MG tablet Take 50 mg by mouth daily      aspirin 325 MG tablet Take 325 mg by mouth daily      loratadine (CLARITIN) 10 MG tablet Take 1 tablet by mouth daily       No current facility-administered medications on file prior to encounter. REVIEW OF SYSTEMS  Review of Systems    Pertinent items are noted in HPI. Review of Systems: A 12 point review of symptoms is unremarkable with the exception of the chief complaint. Patient specifically denies nausea, fever, vomiting, chills, shortness of breath, chest pain, abdominal pain, constipation or difficulty urinating. Objective:      /86   Pulse 96   Temp 97.5 °F (36.4 °C) (Temporal)   Resp 18     Wt Readings from Last 3 Encounters:   01/05/23 245 lb 6 oz (111.3 kg)   12/06/22 246 lb 14.6 oz (112 kg)   12/01/15 221 lb (100.2 kg)       PHYSICAL EXAM  Physical Exam    Patient has a palpable dorsalis pedis pulse noted bilaterally. Posterior tibial pulses palpable bilaterally. Sparse hair growth is noted.   Brawny pigmentary changes noted on the bilateral lower extremities from chronic venous disease. Multiple varicose veins noted. Protective sensation as tested with a monofilament is absent at all sites tested bilaterally. Ulceration noted on the plantar aspect at the level of the PIPJ on the third digit. There is no erythema, edema, or warmth  There is no fluctuance or crepitus noted. 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 It is stable with no hypermobility noted. 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 The ulcerations correspond to the AFO plate that was likely digging into her skin the AFO does not fit anymore due to progression of her contracture. Assessment:        Problem List Items Addressed This Visit          Endocrine    Diabetic infection of right foot ([address] - Primary    Relevant Orders    Initiate Outpatient Wound Care Protocol    Diabetic ulcer of toe of right foot associated with type 2 diabetes mellitus, with fat layer exposed ([address]    Relevant Orders    Initiate Outpatient Wound Care Protocol        Procedure Note  Indications:  Based on my examination of this patient's wound(s)/ulcer(s) today, debridement is required to promote healing and evaluate the wound base. Performed by: Jazmine Gallo DPM    Consent obtained:  Yes    Time out taken:  Yes    Pain Control: Anesthetic  Anesthetic: 4% Lidocaine Liquid Topical       Debridement: Excisional Debridement    Using curette the wound(s)/ulcer(s) was/were debrided down through and including the removal of epidermis, dermis, and subcutaneous tissue. Devitalized Tissue Debrided:  fibrin and slough    Pre Debridement Measurements:  Are located in the [place]  Documentation Flow Sheet    Diabetic/Pressure/Non Pressure Ulcers only:  Ulcer: Diabetic ulcer to the level of the subcutaneous tissue.     Wound/Ulcer #: 2    Post Debridement Measurements:  Wound/Ulcer Descriptions are Pre Debridement except measurements:    Wound 12/06/22 Toe (Comment  which one) Plantar;Right #2 third toe (Active)   Wound Image   01/05/23 1330   Wound Etiology Diabetic Val[place] 2 12/06/22 1423   Wound Cleansed Cleansed with saline 01/19/23 1344   Dressing/Treatment Pharmaceutical agent (see MAR) 01/12/23 1350   Wound Length (cm) 0.2 cm 01/19/23 1344   Wound Width (cm) 0.2 cm 01/19/23 1344   Wound Depth (cm) 0.1 cm 01/19/23 1344   Wound Surface Area (cm^2) 0.04 cm^2 01/19/23 1344   Change in Wound Size % (l*w) 88.57 01/19/23 1344   Wound Volume (cm^3) 0.004 cm^3 01/19/23 1344   Wound Healing % 98 01/19/23 1344   Post-Procedure Length (cm) 0.3 cm 01/19/23 1401   Post-Procedure Width (cm) 0.3 cm 01/19/23 1401   Post-Procedure Depth (cm) 0.3 cm 01/19/23 1401   Post-Procedure Surface Area (cm^2) 0.09 cm^2 01/19/23 1401   Post-Procedure Volume (cm^3) 0.027 cm^3 01/19/23 1401   Distance Tunneling (cm) 0 cm 01/12/23 1330   Tunneling Position ___ O'Clock 0 01/05/23 1330   Undermining Starts ___ O'Clock 0 01/05/23 1330   Undermining Ends___ O'Clock 0 01/05/23 1330   Undermining Maxium Distance (cm) 0 01/12/23 1330   Wound Assessment Pink/red 01/19/23 1344   Drainage Amount Scant 01/19/23 1344   Drainage Description Yellow;Clear 01/19/23 1344   Odor None 01/19/23 1344   Donna-wound Assessment Intact 01/19/23 1344   Margins Defined edges 01/19/23 1344   Wound Thickness Description not for Pressure Injury Full thickness 01/19/23 1344   Number of days: 44          Total Surface Area Debrided:  0.09 sq cm     Estimated Blood Loss:  Minimal    Hemostasis Achieved:  by pressure    Procedural Pain:  0  / 10     Post Procedural Pain:  0 / 10     Response to treatment:  Well tolerated by patient. Plan:         Patient was instructed to discontinue utilizing her AFO and will ambulate in a surgical shoe and a walker until ulcerations are healed. Patient will need a new AFO as they are likely the cause of the neuropathic ulcerations.   Discussed with patient that if she has difficulty ambulating in her surgical shoe she can use her motorized scooter for most of her ambulation. She still needs to wear the surgical shoe to prevent bending at the digits to offload the area    Treatment Note please see attached Discharge Instructions    Written patient dismissal instructions given to patient and signed by patient or POA. Reappoint 1 week for follow-up    Discharge Instructions         . [address] [facility] Physician Orders and Discharge Instructions  [address] [address] [address] [place] [address]. [place] [address]  Telephone: [phone]  NAME:  Rian Nina  YOB: 1960  MEDICAL RECORD NUMBER:  [id number]  DATE: 1/19/23     Return Appointment:  Return Appointment: With Sujata Gonzalez DPM  in  [facility])  [] Return Appointment for a Wound Assessment with the nurse on:     Future Appointments   Date Time Provider Ruddy Chong   1/26/2023  1:15 PM Silva Samano  [facility] [address]: none  Medically necessary services: [] Skilled Nursing [] PT (Eval & Treat) [] OT (Eval & Treat) [] Social Work [] Dietician  [] Other:    Wound care instructions: If you smoke we ask that you refrain from smoking. Smoking inhibits wounds from healing. When taking antibiotics take the entire prescription as ordered. Do not stop taking until medication is all gone unless otherwise instructed. Exercise as tolerated. Keep weight off wounds and reposition every 2 hours if applicable. Do not get wounds wet in the bath or shower unless otherwise instructed by your physician. If your wound is on your foot or leg, you may purchase a cast bag. Please ask at the pharmacy. Wash hands with soap and water prior to and after every dressing change.     [x]Wash wounds with: 0.9% normal saline  [x]Donna wound Topical Treatments: Do not apply lotions, creams, or ointments to the skin around the wound bed unless directed as followed:   Apply around the wound: Nothing         [x]Wound Location: right toes   Apply Primary Dressing to wound: Honey gel  Secondary Dressing: 2X2 gauze pad, Conforming roll gauze, and or bandaid   Avoid contact of tape with skin if possible. When to change Dressing: Daily            [x] Edema Control:     Elevate leg(s) above the level of the heart for 30 minutes 4-5 times a day and/or when sitting. Avoid prolonged standing in one place. [x]Off Loading(Pressure Reduction) When:   Weight Bearing Status: Partial Weight bearing Right:  Heel and Transfers Only;  Offloading devices: Post op shoe/ Surgical shoe: Right and scooter      Dietary:  Important dietary reminders:  1. Increase Protein intake (i.e. Lean meats, fish, eggs, legumes, and yogurt)  2. No added salt  3. If diabetic, follow a diabetic diet and check glucose prior to meals or as instructed by your physician. Dietary Supplements(Take twice a day unless instructed otherwise):  [] Hans Cummiko  [] 30ml ProStat [] Ruel Ocampo [] Ensure Max/Premier [] Other:    Your nurse  is:  Anil Barker     Electronically signed by Nette Chaudhary RN on 1/19/2023 at 2:02 PM     [facility] Information: Should you experience any significant changes in your wound(s) or have questions about your wound care, please contact the [address] [place] [address] at [phone]. We are open from 8:00am - 4:30p Monday, Thursday and Friday; 11:00am - 5pm on Tuesday and CLOSED Wednesday. Please give us 24-48 business hours to return your call. Call your doctor now or seek immediate medical care if:    You have symptoms of infection, such as: Increased pain, swelling, warmth, or redness. Red streaks leading from the area. Pus draining from the area. A fever.          [] Patient unable to sign Discharge Instructions given to ECF/Transportation/POA          Electronically signed by Patsy Ayala DPM on 1/19/2023 at 2:13 PM

## 2023-02-17 ENCOUNTER — TELEPHONE (OUTPATIENT)
Dept: WOUND CARE | Age: 63
End: 2023-02-17

## 2023-02-17 DIAGNOSIS — L97.512 DIABETIC ULCER OF TOE OF RIGHT FOOT ASSOCIATED WITH TYPE 2 DIABETES MELLITUS, WITH FAT LAYER EXPOSED (HCC): ICD-10-CM

## 2023-02-17 DIAGNOSIS — E11.628 DIABETIC INFECTION OF RIGHT FOOT (HCC): Primary | ICD-10-CM

## 2023-02-17 DIAGNOSIS — E11.621 DIABETIC ULCER OF TOE OF RIGHT FOOT ASSOCIATED WITH TYPE 2 DIABETES MELLITUS, WITH FAT LAYER EXPOSED (HCC): ICD-10-CM

## 2023-02-17 DIAGNOSIS — L08.9 DIABETIC INFECTION OF RIGHT FOOT (HCC): Primary | ICD-10-CM

## 2023-02-17 NOTE — TELEPHONE ENCOUNTER
----- Message from Nessa Thompson RN sent at 2/7/2023  3:23 PM EST -----  Regarding: FW: f/u    ----- Message -----  From: Torin Bazan  Sent: 2/7/2023   1:20 PM EST  To: Nessa Thompson RN  Subject: RE: f/u                                          Left message  ----- Message -----  From: Nessa Thompson RN  Sent: 2/6/2023   3:32 PM EST  To: Torin Bazan  Subject: f/u                                              Please call pt to see if she needs f/u appt. If wound healed forward call to [facility] ([place], [place]).  Thanks

## 2023-03-06 ENCOUNTER — HOSPITAL ENCOUNTER (INPATIENT)
Age: 63
LOS: 4 days | Discharge: HOME | End: 2023-03-10
Attending: EMERGENCY MEDICINE | Admitting: INTERNAL MEDICINE
Payer: COMMERCIAL

## 2023-03-06 ENCOUNTER — APPOINTMENT (OUTPATIENT)
Dept: GENERAL RADIOLOGY | Age: 63
End: 2023-03-06
Payer: COMMERCIAL

## 2023-03-06 DIAGNOSIS — R10.9 ABDOMINAL PAIN, UNSPECIFIED ABDOMINAL LOCATION: Primary | ICD-10-CM

## 2023-03-06 DIAGNOSIS — R74.01 TRANSAMINITIS: ICD-10-CM

## 2023-03-06 LAB
ALBUMIN SERPL-MCNC: 3.7 G/DL (ref 3.4–5)
ALP BLD-CCNC: 146 U/L (ref 40–129)
ALT SERPL-CCNC: 86 U/L (ref 10–40)
ANION GAP SERPL CALCULATED.3IONS-SCNC: 13 MMOL/L (ref 3–16)
AST SERPL-CCNC: 40 U/L (ref 15–37)
BACTERIA: ABNORMAL /HPF
BASOPHILS ABSOLUTE: 0 K/UL (ref 0–0.2)
BASOPHILS RELATIVE PERCENT: 0.2 %
BILIRUB SERPL-MCNC: 0.7 MG/DL (ref 0–1)
BILIRUBIN DIRECT: <0.2 MG/DL (ref 0–0.3)
BILIRUBIN URINE: NEGATIVE
BILIRUBIN, INDIRECT: ABNORMAL MG/DL (ref 0–1)
BLOOD, URINE: NEGATIVE
BUN BLDV-MCNC: 14 MG/DL (ref 7–20)
CALCIUM SERPL-MCNC: 10.1 MG/DL (ref 8.3–10.6)
CHLORIDE BLD-SCNC: 95 MMOL/L (ref 99–110)
CLARITY: CLEAR
CO2: 23 MMOL/L (ref 21–32)
COLOR: YELLOW
CREAT SERPL-MCNC: 0.7 MG/DL (ref 0.6–1.2)
EOSINOPHILS ABSOLUTE: 0 K/UL (ref 0–0.6)
EOSINOPHILS RELATIVE PERCENT: 0.1 %
EPITHELIAL CELLS, UA: ABNORMAL /HPF (ref 0–5)
GFR SERPL CREATININE-BSD FRML MDRD: >60 ML/MIN/{1.73_M2}
GLUCOSE BLD-MCNC: 338 MG/DL (ref 70–99)
GLUCOSE URINE: 500 MG/DL
HCT VFR BLD CALC: 42.2 % (ref 36–48)
HEMOGLOBIN: 14.1 G/DL (ref 12–16)
INFLUENZA A: NOT DETECTED
INFLUENZA B: NOT DETECTED
KETONES, URINE: >=80 MG/DL
LEUKOCYTE ESTERASE, URINE: NEGATIVE
LIPASE: 14 U/L (ref 13–60)
LYMPHOCYTES ABSOLUTE: 0.6 K/UL (ref 1–5.1)
LYMPHOCYTES RELATIVE PERCENT: 6.8 %
MCH RBC QN AUTO: 27.9 PG (ref 26–34)
MCHC RBC AUTO-ENTMCNC: 33.3 G/DL (ref 31–36)
MCV RBC AUTO: 84 FL (ref 80–100)
MICROSCOPIC EXAMINATION: YES
MONOCYTES ABSOLUTE: 0.7 K/UL (ref 0–1.3)
MONOCYTES RELATIVE PERCENT: 8 %
NEUTROPHILS ABSOLUTE: 7.3 K/UL (ref 1.7–7.7)
NEUTROPHILS RELATIVE PERCENT: 84.9 %
NITRITE, URINE: NEGATIVE
PDW BLD-RTO: 13.9 % (ref 12.4–15.4)
PH UA: 6 (ref 5–8)
PLATELET # BLD: 175 K/UL (ref 135–450)
PMV BLD AUTO: 8.2 FL (ref 5–10.5)
POTASSIUM REFLEX MAGNESIUM: 4.2 MMOL/L (ref 3.5–5.1)
PROTEIN UA: ABNORMAL MG/DL
RBC # BLD: 5.03 M/UL (ref 4–5.2)
RBC UA: ABNORMAL /HPF (ref 0–4)
SARS-COV-2 RNA, RT PCR: NOT DETECTED
SODIUM BLD-SCNC: 131 MMOL/L (ref 136–145)
SPECIFIC GRAVITY UA: 1.02 (ref 1–1.03)
TOTAL PROTEIN: 7.3 G/DL (ref 6.4–8.2)
URINE REFLEX TO CULTURE: ABNORMAL
URINE TYPE: ABNORMAL
UROBILINOGEN, URINE: 0.2 E.U./DL
WBC # BLD: 8.6 K/UL (ref 4–11)
WBC UA: ABNORMAL /HPF (ref 0–5)

## 2023-03-06 PROCEDURE — 36415 COLL VENOUS BLD VENIPUNCTURE: CPT

## 2023-03-06 PROCEDURE — 99285 EMERGENCY DEPT VISIT HI MDM: CPT

## 2023-03-06 PROCEDURE — 85025 COMPLETE CBC W/AUTO DIFF WBC: CPT

## 2023-03-06 PROCEDURE — 80048 BASIC METABOLIC PNL TOTAL CA: CPT

## 2023-03-06 PROCEDURE — 1200000000 HC SEMI PRIVATE

## 2023-03-06 PROCEDURE — 99223 1ST HOSP IP/OBS HIGH 75: CPT | Performed by: SURGERY

## 2023-03-06 PROCEDURE — 83690 ASSAY OF LIPASE: CPT

## 2023-03-06 PROCEDURE — 2580000003 HC RX 258: Performed by: STUDENT IN AN ORGANIZED HEALTH CARE EDUCATION/TRAINING PROGRAM

## 2023-03-06 PROCEDURE — 74018 RADEX ABDOMEN 1 VIEW: CPT

## 2023-03-06 PROCEDURE — 6360000002 HC RX W HCPCS: Performed by: STUDENT IN AN ORGANIZED HEALTH CARE EDUCATION/TRAINING PROGRAM

## 2023-03-06 PROCEDURE — 87636 SARSCOV2 & INF A&B AMP PRB: CPT

## 2023-03-06 PROCEDURE — 80076 HEPATIC FUNCTION PANEL: CPT

## 2023-03-06 PROCEDURE — 81001 URINALYSIS AUTO W/SCOPE: CPT

## 2023-03-06 RX ORDER — DICYCLOMINE HYDROCHLORIDE 10 MG/ML
20 INJECTION INTRAMUSCULAR ONCE
Status: DISCONTINUED | OUTPATIENT
Start: 2023-03-06 | End: 2023-03-08

## 2023-03-06 RX ORDER — SODIUM CHLORIDE, SODIUM LACTATE, POTASSIUM CHLORIDE, AND CALCIUM CHLORIDE .6; .31; .03; .02 G/100ML; G/100ML; G/100ML; G/100ML
1000 INJECTION, SOLUTION INTRAVENOUS ONCE
Status: COMPLETED | OUTPATIENT
Start: 2023-03-06 | End: 2023-03-07

## 2023-03-06 RX ADMIN — SODIUM CHLORIDE, POTASSIUM CHLORIDE, SODIUM LACTATE AND CALCIUM CHLORIDE 1000 ML: 600; 310; 30; 20 INJECTION, SOLUTION INTRAVENOUS at 22:11

## 2023-03-06 RX ADMIN — PIPERACILLIN SODIUM AND TAZOBACTAM SODIUM 3375 MG: 3; .375 INJECTION, POWDER, LYOPHILIZED, FOR SOLUTION INTRAVENOUS at 23:31

## 2023-03-06 ASSESSMENT — PAIN DESCRIPTION - PAIN TYPE: TYPE: ACUTE PAIN

## 2023-03-06 ASSESSMENT — PAIN SCALES - GENERAL
PAINLEVEL_OUTOF10: 7
PAINLEVEL_OUTOF10: 8

## 2023-03-06 ASSESSMENT — PAIN DESCRIPTION - ORIENTATION
ORIENTATION: RIGHT;UPPER
ORIENTATION: RIGHT;UPPER

## 2023-03-06 ASSESSMENT — PAIN DESCRIPTION - LOCATION
LOCATION: ABDOMEN
LOCATION: ABDOMEN

## 2023-03-06 ASSESSMENT — PAIN DESCRIPTION - DESCRIPTORS
DESCRIPTORS: STABBING
DESCRIPTORS: STABBING

## 2023-03-06 ASSESSMENT — PAIN - FUNCTIONAL ASSESSMENT
PAIN_FUNCTIONAL_ASSESSMENT: 0-10
PAIN_FUNCTIONAL_ASSESSMENT: ACTIVITIES ARE NOT PREVENTED

## 2023-03-06 NOTE — ED TRIAGE NOTES
[facility] Emergency Department  MEDICAL SCREENING EXAM    Date of Service: 3/6/2023    Reason for Visit: Abdominal Pain (Pt reports pain that started last Thursday was seen at 2 EDs for the abdominal pain. Pt reports taking dicyclomine that has helped her pain. Was also discharged home with zofran that hasnt helped her pain. Pt reports pain that is unrelieved with the medication that she has been discharged with.)        Patient History, Brief Exam, and Initial Assessment     Abbreviated HPI: Erin Donis is a 65 y.o. female with prior cholecystectomy presenting with abdominal pain. The patient seems to localize it more to the right upper area of her abdomen. It has been present since she vomited on Thursday. She had another episode of vomiting on Friday. She went to [facility] emergency department yesterday morning and had a complete work-up for abdominal pain including CT and no abnormalities were found and no cause for her pain was identified. She was discharged on dicyclomine and Zofran which seems to help her symptoms but the pain continued to get worse so this morning she went to [facility], and then went to [facility] where they told her to continue taking the same medications but did not do another CT scan. She states that this pain is very severe. On examination, there is no localizable tenderness on exam.  She does note that there seems to be more pain in the right upper quadrant area. INITIAL VITALS: BP: (!) 167/84, Temp: 100.4 °F (38 °C), Heart Rate: (!) 107, Resp: 18, SpO2: 95 %    Plan     Patient was evaluated in the REU for a medical screening exam.  To further evaluate the presenting complaints, the following orders have been placed: Abdominal labs. See primary provider's note for full details and final disposition. Current Facility-Administered Medications:   No orders of the defined types were placed in this encounter.         REU Dispo Stable for lobby while awaiting ED bed      Relevant Medical History     Past Medical History:   Diagnosis Date    Arthritis     Asthma     exercise/cold induced    Blindness, legal     [facility]cerebral vascular [facility]) [facility] 2015    Depression     Diabetes mellitus ([facility]     ION (ischemic optic neuropathy)     developed post-op r/t hypotension    Osteomyelitis ([facility]     Left foot; finished Vanco. end of .     TIA (transient ischemic attack)      Past Surgical History:   Procedure Laterality Date    BACK SURGERY       SECTION      CHOLECYSTECTOMY      ELBOW SURGERY Left     FOOT SURGERY Left 2014    INCISION AND DRAINAGE MID FOOT LEFT, ENDOSCOPIC GASTROC    FOOT SURGERY Left 2014    LEFT FOOT EXTERNAL FIXATOR REMOVAL        HYSTERECTOMY (CERVIX STATUS UNKNOWN)      ORTHOPEDIC SURGERY      OTHER SURGICAL HISTORY Left 2014    INCISION AND DRAINAGE LEFT FOOT    SOCORRO AND BSO (CERVIX REMOVED)       Allergies   Allergen Reactions    Theophyllines Hives    Apixaban      Foggy and fatigued    Metformin Other (See Comments)     Gets angry and mcgrath    Morphine Nausea And Vomiting

## 2023-03-07 ENCOUNTER — APPOINTMENT (OUTPATIENT)
Dept: GENERAL RADIOLOGY | Age: 63
End: 2023-03-07
Payer: COMMERCIAL

## 2023-03-07 LAB
ACETAMINOPHEN LEVEL: <5 UG/ML (ref 10–30)
ALBUMIN SERPL-MCNC: 3.1 G/DL (ref 3.4–5)
ALP BLD-CCNC: 194 U/L (ref 40–129)
ALT SERPL-CCNC: 109 U/L (ref 10–40)
ANION GAP SERPL CALCULATED.3IONS-SCNC: 12 MMOL/L (ref 3–16)
ANION GAP SERPL CALCULATED.3IONS-SCNC: 12 MMOL/L (ref 3–16)
AST SERPL-CCNC: 87 U/L (ref 15–37)
BACTERIA: ABNORMAL /HPF
BASOPHILS ABSOLUTE: 0 K/UL (ref 0–0.2)
BASOPHILS RELATIVE PERCENT: 0.3 %
BILIRUB SERPL-MCNC: 0.6 MG/DL (ref 0–1)
BILIRUBIN DIRECT: <0.2 MG/DL (ref 0–0.3)
BILIRUBIN URINE: NEGATIVE
BILIRUBIN, INDIRECT: ABNORMAL MG/DL (ref 0–1)
BLOOD, URINE: NEGATIVE
BUN BLDV-MCNC: 10 MG/DL (ref 7–20)
BUN BLDV-MCNC: 11 MG/DL (ref 7–20)
CALCIUM SERPL-MCNC: 8.7 MG/DL (ref 8.3–10.6)
CALCIUM SERPL-MCNC: 9.3 MG/DL (ref 8.3–10.6)
CHLORIDE BLD-SCNC: 96 MMOL/L (ref 99–110)
CHLORIDE BLD-SCNC: 99 MMOL/L (ref 99–110)
CLARITY: CLEAR
CO2: 22 MMOL/L (ref 21–32)
CO2: 25 MMOL/L (ref 21–32)
COLOR: YELLOW
CREAT SERPL-MCNC: 0.6 MG/DL (ref 0.6–1.2)
CREAT SERPL-MCNC: 0.7 MG/DL (ref 0.6–1.2)
EKG ATRIAL RATE: 174 BPM
EKG DIAGNOSIS: NORMAL
EKG Q-T INTERVAL: 254 MS
EKG QRS DURATION: 162 MS
EKG QTC CALCULATION (BAZETT): 452 MS
EKG R AXIS: -23 DEGREES
EKG T AXIS: 92 DEGREES
EKG VENTRICULAR RATE: 191 BPM
EOSINOPHILS ABSOLUTE: 0 K/UL (ref 0–0.6)
EOSINOPHILS RELATIVE PERCENT: 0.1 %
EPITHELIAL CELLS, UA: ABNORMAL /HPF (ref 0–5)
GFR SERPL CREATININE-BSD FRML MDRD: >60 ML/MIN/{1.73_M2}
GFR SERPL CREATININE-BSD FRML MDRD: >60 ML/MIN/{1.73_M2}
GLUCOSE BLD-MCNC: 199 MG/DL (ref 70–99)
GLUCOSE BLD-MCNC: 220 MG/DL (ref 70–99)
GLUCOSE BLD-MCNC: 221 MG/DL (ref 70–99)
GLUCOSE BLD-MCNC: 247 MG/DL (ref 70–99)
GLUCOSE BLD-MCNC: 261 MG/DL (ref 70–99)
GLUCOSE BLD-MCNC: 261 MG/DL (ref 70–99)
GLUCOSE BLD-MCNC: 287 MG/DL (ref 70–99)
GLUCOSE BLD-MCNC: 313 MG/DL (ref 70–99)
GLUCOSE URINE: 500 MG/DL
HCT VFR BLD CALC: 36.8 % (ref 36–48)
HEMOGLOBIN: 12.5 G/DL (ref 12–16)
KETONES, URINE: >=80 MG/DL
LEUKOCYTE ESTERASE, URINE: NEGATIVE
LV EF: 58 %
LVEF MODALITY: NORMAL
LYMPHOCYTES ABSOLUTE: 0.8 K/UL (ref 1–5.1)
LYMPHOCYTES RELATIVE PERCENT: 8.4 %
MAGNESIUM: 1.7 MG/DL (ref 1.8–2.4)
MCH RBC QN AUTO: 28.6 PG (ref 26–34)
MCHC RBC AUTO-ENTMCNC: 34.1 G/DL (ref 31–36)
MCV RBC AUTO: 83.9 FL (ref 80–100)
MICROSCOPIC EXAMINATION: YES
MONOCYTES ABSOLUTE: 1 K/UL (ref 0–1.3)
MONOCYTES RELATIVE PERCENT: 10.6 %
NEUTROPHILS ABSOLUTE: 7.8 K/UL (ref 1.7–7.7)
NEUTROPHILS RELATIVE PERCENT: 80.6 %
NITRITE, URINE: NEGATIVE
PDW BLD-RTO: 13.7 % (ref 12.4–15.4)
PERFORMED ON: ABNORMAL
PH UA: 6 (ref 5–8)
PH VENOUS: 7.35 (ref 7.35–7.45)
PHOSPHORUS: 2.8 MG/DL (ref 2.5–4.9)
PLATELET # BLD: 160 K/UL (ref 135–450)
PMV BLD AUTO: 8.5 FL (ref 5–10.5)
POTASSIUM SERPL-SCNC: 4.1 MMOL/L (ref 3.5–5.1)
POTASSIUM SERPL-SCNC: 4.5 MMOL/L (ref 3.5–5.1)
PROTEIN UA: ABNORMAL MG/DL
RBC # BLD: 4.38 M/UL (ref 4–5.2)
RBC UA: ABNORMAL /HPF (ref 0–4)
SODIUM BLD-SCNC: 133 MMOL/L (ref 136–145)
SODIUM BLD-SCNC: 133 MMOL/L (ref 136–145)
SPECIFIC GRAVITY UA: >=1.03 (ref 1–1.03)
TOTAL PROTEIN: 6 G/DL (ref 6.4–8.2)
URINE TYPE: ABNORMAL
UROBILINOGEN, URINE: 0.2 E.U./DL
WBC # BLD: 9.7 K/UL (ref 4–11)
WBC UA: ABNORMAL /HPF (ref 0–5)

## 2023-03-07 PROCEDURE — 83735 ASSAY OF MAGNESIUM: CPT

## 2023-03-07 PROCEDURE — 87040 BLOOD CULTURE FOR BACTERIA: CPT

## 2023-03-07 PROCEDURE — 6360000002 HC RX W HCPCS

## 2023-03-07 PROCEDURE — C8929 TTE W OR WO FOL WCON,DOPPLER: HCPCS

## 2023-03-07 PROCEDURE — 2580000003 HC RX 258

## 2023-03-07 PROCEDURE — 80076 HEPATIC FUNCTION PANEL: CPT

## 2023-03-07 PROCEDURE — 80048 BASIC METABOLIC PNL TOTAL CA: CPT

## 2023-03-07 PROCEDURE — 2060000000 HC ICU INTERMEDIATE R&B

## 2023-03-07 PROCEDURE — 36569 INSJ PICC 5 YR+ W/O IMAGING: CPT

## 2023-03-07 PROCEDURE — 93005 ELECTROCARDIOGRAM TRACING: CPT | Performed by: INTERNAL MEDICINE

## 2023-03-07 PROCEDURE — 2580000003 HC RX 258: Performed by: STUDENT IN AN ORGANIZED HEALTH CARE EDUCATION/TRAINING PROGRAM

## 2023-03-07 PROCEDURE — 2500000003 HC RX 250 WO HCPCS

## 2023-03-07 PROCEDURE — 6360000002 HC RX W HCPCS: Performed by: STUDENT IN AN ORGANIZED HEALTH CARE EDUCATION/TRAINING PROGRAM

## 2023-03-07 PROCEDURE — 81001 URINALYSIS AUTO W/SCOPE: CPT

## 2023-03-07 PROCEDURE — 99233 SBSQ HOSP IP/OBS HIGH 50: CPT | Performed by: SURGERY

## 2023-03-07 PROCEDURE — 99223 1ST HOSP IP/OBS HIGH 75: CPT | Performed by: INTERNAL MEDICINE

## 2023-03-07 PROCEDURE — 6370000000 HC RX 637 (ALT 250 FOR IP)

## 2023-03-07 PROCEDURE — C1751 CATH, INF, PER/CENT/MIDLINE: HCPCS

## 2023-03-07 PROCEDURE — 93010 ELECTROCARDIOGRAM REPORT: CPT | Performed by: INTERNAL MEDICINE

## 2023-03-07 PROCEDURE — 6370000000 HC RX 637 (ALT 250 FOR IP): Performed by: STUDENT IN AN ORGANIZED HEALTH CARE EDUCATION/TRAINING PROGRAM

## 2023-03-07 PROCEDURE — 82330 ASSAY OF CALCIUM: CPT

## 2023-03-07 PROCEDURE — 36415 COLL VENOUS BLD VENIPUNCTURE: CPT

## 2023-03-07 PROCEDURE — 80069 RENAL FUNCTION PANEL: CPT

## 2023-03-07 PROCEDURE — 85025 COMPLETE CBC W/AUTO DIFF WBC: CPT

## 2023-03-07 PROCEDURE — 71045 X-RAY EXAM CHEST 1 VIEW: CPT

## 2023-03-07 PROCEDURE — 80143 DRUG ASSAY ACETAMINOPHEN: CPT

## 2023-03-07 PROCEDURE — 02HV33Z INSERTION OF INFUSION DEVICE INTO SUPERIOR VENA CAVA, PERCUTANEOUS APPROACH: ICD-10-PCS | Performed by: INTERNAL MEDICINE

## 2023-03-07 PROCEDURE — 80074 ACUTE HEPATITIS PANEL: CPT

## 2023-03-07 PROCEDURE — C8923 2D TTE W OR W/O FOL W/CON,CO: HCPCS

## 2023-03-07 PROCEDURE — 93005 ELECTROCARDIOGRAM TRACING: CPT

## 2023-03-07 PROCEDURE — 6360000004 HC RX CONTRAST MEDICATION: Performed by: INTERNAL MEDICINE

## 2023-03-07 RX ORDER — SODIUM CHLORIDE 9 MG/ML
INJECTION, SOLUTION INTRAVENOUS CONTINUOUS
Status: DISCONTINUED | OUTPATIENT
Start: 2023-03-07 | End: 2023-03-07

## 2023-03-07 RX ORDER — INSULIN LISPRO 100 [IU]/ML
0-4 INJECTION, SOLUTION INTRAVENOUS; SUBCUTANEOUS NIGHTLY
Status: DISCONTINUED | OUTPATIENT
Start: 2023-03-07 | End: 2023-03-10 | Stop reason: HOSPADM

## 2023-03-07 RX ORDER — POLYETHYLENE GLYCOL 3350 17 G/17G
17 POWDER, FOR SOLUTION ORAL DAILY PRN
Status: DISCONTINUED | OUTPATIENT
Start: 2023-03-07 | End: 2023-03-07

## 2023-03-07 RX ORDER — SODIUM CHLORIDE 9 MG/ML
25 INJECTION, SOLUTION INTRAVENOUS PRN
Status: DISCONTINUED | OUTPATIENT
Start: 2023-03-07 | End: 2023-03-09

## 2023-03-07 RX ORDER — POLYETHYLENE GLYCOL 3350 17 G/17G
17 POWDER, FOR SOLUTION ORAL 2 TIMES DAILY
Status: DISCONTINUED | OUTPATIENT
Start: 2023-03-07 | End: 2023-03-10 | Stop reason: HOSPADM

## 2023-03-07 RX ORDER — DIGOXIN 0.25 MG/ML
250 INJECTION INTRAMUSCULAR; INTRAVENOUS EVERY 4 HOURS
Status: DISCONTINUED | OUTPATIENT
Start: 2023-03-07 | End: 2023-03-07

## 2023-03-07 RX ORDER — ACETAMINOPHEN 325 MG/1
650 TABLET ORAL EVERY 6 HOURS PRN
Status: DISCONTINUED | OUTPATIENT
Start: 2023-03-07 | End: 2023-03-08

## 2023-03-07 RX ORDER — ONDANSETRON 2 MG/ML
4 INJECTION INTRAMUSCULAR; INTRAVENOUS EVERY 6 HOURS PRN
Status: DISCONTINUED | OUTPATIENT
Start: 2023-03-07 | End: 2023-03-10 | Stop reason: HOSPADM

## 2023-03-07 RX ORDER — ONDANSETRON 4 MG/1
4 TABLET, ORALLY DISINTEGRATING ORAL EVERY 8 HOURS PRN
Status: DISCONTINUED | OUTPATIENT
Start: 2023-03-07 | End: 2023-03-10 | Stop reason: HOSPADM

## 2023-03-07 RX ORDER — DIGOXIN 0.25 MG/ML
500 INJECTION INTRAMUSCULAR; INTRAVENOUS ONCE
Status: DISCONTINUED | OUTPATIENT
Start: 2023-03-07 | End: 2023-03-09

## 2023-03-07 RX ORDER — ONDANSETRON 4 MG/1
4 TABLET, ORALLY DISINTEGRATING ORAL EVERY 8 HOURS PRN
COMMUNITY

## 2023-03-07 RX ORDER — MAGNESIUM SULFATE IN WATER 40 MG/ML
2000 INJECTION, SOLUTION INTRAVENOUS ONCE
Status: COMPLETED | OUTPATIENT
Start: 2023-03-07 | End: 2023-03-07

## 2023-03-07 RX ORDER — SODIUM CHLORIDE 0.9 % (FLUSH) 0.9 %
5-40 SYRINGE (ML) INJECTION EVERY 12 HOURS SCHEDULED
Status: DISCONTINUED | OUTPATIENT
Start: 2023-03-07 | End: 2023-03-10 | Stop reason: HOSPADM

## 2023-03-07 RX ORDER — METOPROLOL TARTRATE 5 MG/5ML
INJECTION INTRAVENOUS
Status: COMPLETED
Start: 2023-03-07 | End: 2023-03-07

## 2023-03-07 RX ORDER — SODIUM CHLORIDE 0.9 % (FLUSH) 0.9 %
5-40 SYRINGE (ML) INJECTION PRN
Status: DISCONTINUED | OUTPATIENT
Start: 2023-03-07 | End: 2023-03-10 | Stop reason: HOSPADM

## 2023-03-07 RX ORDER — DICYCLOMINE HCL 20 MG
20 TABLET ORAL 3 TIMES DAILY PRN
COMMUNITY

## 2023-03-07 RX ORDER — INSULIN LISPRO 100 [IU]/ML
0-4 INJECTION, SOLUTION INTRAVENOUS; SUBCUTANEOUS NIGHTLY
Status: DISCONTINUED | OUTPATIENT
Start: 2023-03-07 | End: 2023-03-07

## 2023-03-07 RX ORDER — MIDAZOLAM HYDROCHLORIDE 1 MG/ML
INJECTION INTRAMUSCULAR; INTRAVENOUS
Status: DISPENSED
Start: 2023-03-07 | End: 2023-03-07

## 2023-03-07 RX ORDER — 0.9 % SODIUM CHLORIDE 0.9 %
500 INTRAVENOUS SOLUTION INTRAVENOUS ONCE
Status: COMPLETED | OUTPATIENT
Start: 2023-03-07 | End: 2023-03-07

## 2023-03-07 RX ORDER — DOCUSATE SODIUM 100 MG/1
100 CAPSULE, LIQUID FILLED ORAL 2 TIMES DAILY
Status: DISCONTINUED | OUTPATIENT
Start: 2023-03-07 | End: 2023-03-10 | Stop reason: HOSPADM

## 2023-03-07 RX ORDER — LIDOCAINE HYDROCHLORIDE 10 MG/ML
5 INJECTION, SOLUTION EPIDURAL; INFILTRATION; INTRACAUDAL; PERINEURAL ONCE
Status: COMPLETED | OUTPATIENT
Start: 2023-03-07 | End: 2023-03-07

## 2023-03-07 RX ORDER — GLUCAGON 1 MG/ML
1 KIT INJECTION PRN
Status: DISCONTINUED | OUTPATIENT
Start: 2023-03-07 | End: 2023-03-10 | Stop reason: HOSPADM

## 2023-03-07 RX ORDER — ACETAMINOPHEN 650 MG/1
650 SUPPOSITORY RECTAL EVERY 6 HOURS PRN
Status: DISCONTINUED | OUTPATIENT
Start: 2023-03-07 | End: 2023-03-08

## 2023-03-07 RX ORDER — DEXTROSE MONOHYDRATE 100 MG/ML
INJECTION, SOLUTION INTRAVENOUS CONTINUOUS PRN
Status: DISCONTINUED | OUTPATIENT
Start: 2023-03-07 | End: 2023-03-10 | Stop reason: HOSPADM

## 2023-03-07 RX ORDER — SODIUM CHLORIDE 0.9 % (FLUSH) 0.9 %
5-40 SYRINGE (ML) INJECTION EVERY 12 HOURS SCHEDULED
Status: DISCONTINUED | OUTPATIENT
Start: 2023-03-07 | End: 2023-03-09

## 2023-03-07 RX ORDER — ENOXAPARIN SODIUM 100 MG/ML
30 INJECTION SUBCUTANEOUS 2 TIMES DAILY
Status: DISCONTINUED | OUTPATIENT
Start: 2023-03-07 | End: 2023-03-09

## 2023-03-07 RX ORDER — INSULIN LISPRO 100 [IU]/ML
0-4 INJECTION, SOLUTION INTRAVENOUS; SUBCUTANEOUS
Status: DISCONTINUED | OUTPATIENT
Start: 2023-03-07 | End: 2023-03-10 | Stop reason: HOSPADM

## 2023-03-07 RX ORDER — KETOROLAC TROMETHAMINE 15 MG/ML
15 INJECTION, SOLUTION INTRAMUSCULAR; INTRAVENOUS ONCE
Status: COMPLETED | OUTPATIENT
Start: 2023-03-07 | End: 2023-03-09

## 2023-03-07 RX ORDER — SODIUM CHLORIDE 9 MG/ML
INJECTION, SOLUTION INTRAVENOUS PRN
Status: DISCONTINUED | OUTPATIENT
Start: 2023-03-07 | End: 2023-03-10 | Stop reason: HOSPADM

## 2023-03-07 RX ADMIN — HYDROMORPHONE HYDROCHLORIDE 0.25 MG: 1 INJECTION, SOLUTION INTRAMUSCULAR; INTRAVENOUS; SUBCUTANEOUS at 04:34

## 2023-03-07 RX ADMIN — ACETAMINOPHEN 650 MG: 325 TABLET ORAL at 09:19

## 2023-03-07 RX ADMIN — SODIUM CHLORIDE, PRESERVATIVE FREE 10 ML: 5 INJECTION INTRAVENOUS at 21:32

## 2023-03-07 RX ADMIN — MEROPENEM 1000 MG: 1 INJECTION, POWDER, FOR SOLUTION INTRAVENOUS at 09:21

## 2023-03-07 RX ADMIN — SODIUM CHLORIDE 25 ML: 9 INJECTION, SOLUTION INTRAVENOUS at 08:36

## 2023-03-07 RX ADMIN — MICONAZOLE NITRATE: 20 POWDER TOPICAL at 21:32

## 2023-03-07 RX ADMIN — INSULIN LISPRO 4 UNITS: 100 INJECTION, SOLUTION INTRAVENOUS; SUBCUTANEOUS at 02:44

## 2023-03-07 RX ADMIN — MINERAL OIL 330 ML: 1000 LIQUID ORAL at 18:01

## 2023-03-07 RX ADMIN — ENOXAPARIN SODIUM 30 MG: 100 INJECTION SUBCUTANEOUS at 01:36

## 2023-03-07 RX ADMIN — SODIUM CHLORIDE, PRESERVATIVE FREE 10 ML: 5 INJECTION INTRAVENOUS at 08:52

## 2023-03-07 RX ADMIN — ENOXAPARIN SODIUM 30 MG: 100 INJECTION SUBCUTANEOUS at 08:53

## 2023-03-07 RX ADMIN — ENOXAPARIN SODIUM 30 MG: 100 INJECTION SUBCUTANEOUS at 21:31

## 2023-03-07 RX ADMIN — LIDOCAINE HYDROCHLORIDE 5 ML: 10 INJECTION, SOLUTION EPIDURAL; INFILTRATION; INTRACAUDAL; PERINEURAL at 08:09

## 2023-03-07 RX ADMIN — PERFLUTREN 1.5 ML: 6.52 INJECTION, SUSPENSION INTRAVENOUS at 16:20

## 2023-03-07 RX ADMIN — INSULIN GLARGINE 5 UNITS: 100 INJECTION, SOLUTION SUBCUTANEOUS at 22:07

## 2023-03-07 RX ADMIN — SODIUM CHLORIDE 25 ML: 9 INJECTION, SOLUTION INTRAVENOUS at 09:20

## 2023-03-07 RX ADMIN — SODIUM CHLORIDE: 9 INJECTION, SOLUTION INTRAVENOUS at 04:34

## 2023-03-07 RX ADMIN — SODIUM CHLORIDE: 9 INJECTION, SOLUTION INTRAVENOUS at 09:23

## 2023-03-07 RX ADMIN — METOPROLOL TARTRATE: 5 INJECTION INTRAVENOUS at 06:59

## 2023-03-07 RX ADMIN — POLYETHYLENE GLYCOL 3350 17 G: 17 POWDER, FOR SOLUTION ORAL at 21:32

## 2023-03-07 RX ADMIN — METOPROLOL TARTRATE: 5 INJECTION INTRAVENOUS at 07:05

## 2023-03-07 RX ADMIN — DILTIAZEM HYDROCHLORIDE 2.5 MG/HR: 5 INJECTION, SOLUTION INTRAVENOUS at 09:34

## 2023-03-07 RX ADMIN — DOCUSATE SODIUM 100 MG: 100 CAPSULE, LIQUID FILLED ORAL at 09:52

## 2023-03-07 RX ADMIN — SODIUM CHLORIDE 500 ML: 900 INJECTION, SOLUTION INTRAVENOUS at 06:30

## 2023-03-07 RX ADMIN — MICONAZOLE NITRATE: 20 POWDER TOPICAL at 08:51

## 2023-03-07 RX ADMIN — MAGNESIUM SULFATE HEPTAHYDRATE 2000 MG: 40 INJECTION, SOLUTION INTRAVENOUS at 08:37

## 2023-03-07 RX ADMIN — MEROPENEM 1000 MG: 1 INJECTION INTRAVENOUS at 18:00

## 2023-03-07 RX ADMIN — DOCUSATE SODIUM 100 MG: 100 CAPSULE, LIQUID FILLED ORAL at 21:32

## 2023-03-07 RX ADMIN — MICONAZOLE NITRATE: 20 POWDER TOPICAL at 02:43

## 2023-03-07 RX ADMIN — SODIUM CHLORIDE, PRESERVATIVE FREE 10 ML: 5 INJECTION INTRAVENOUS at 21:45

## 2023-03-07 RX ADMIN — INSULIN LISPRO 1 UNITS: 100 INJECTION, SOLUTION INTRAVENOUS; SUBCUTANEOUS at 18:33

## 2023-03-07 RX ADMIN — PIPERACILLIN AND TAZOBACTAM 4500 MG: 4; .5 INJECTION, POWDER, LYOPHILIZED, FOR SOLUTION INTRAVENOUS at 05:55

## 2023-03-07 RX ADMIN — HYDROMORPHONE HYDROCHLORIDE 0.5 MG: 1 INJECTION, SOLUTION INTRAMUSCULAR; INTRAVENOUS; SUBCUTANEOUS at 00:05

## 2023-03-07 RX ADMIN — POLYETHYLENE GLYCOL 3350 17 G: 17 POWDER, FOR SOLUTION ORAL at 09:52

## 2023-03-07 ASSESSMENT — PAIN SCALES - GENERAL
PAINLEVEL_OUTOF10: 10
PAINLEVEL_OUTOF10: 8

## 2023-03-07 ASSESSMENT — PAIN DESCRIPTION - LOCATION: LOCATION: ABDOMEN

## 2023-03-07 ASSESSMENT — PAIN DESCRIPTION - ORIENTATION: ORIENTATION: RIGHT

## 2023-03-07 ASSESSMENT — PAIN - FUNCTIONAL ASSESSMENT: PAIN_FUNCTIONAL_ASSESSMENT: ACTIVITIES ARE NOT PREVENTED

## 2023-03-07 NOTE — CONSULTS
[address] [place] [address]  GI Consultation                                                                 Patient: Jonathan Castro  : 1960       Date:  3/7/2023           History of Present Illness:  Jonathan Castro is a 65 y.o. female, with PMHx of CVA, DM and obesity , and SMHx cholecystectomy in [de-identified], and hysterectomy presented to the ED with a continued abdominal pain for last 4 days     Patient reported that she started having abdominal pain on Thursday (4 days ago). It was acute in onset, progressively gotten worse, on the right upper quadrant felt like sharp tearing pain, and diffuse abdominal dull pain and rest of the abdomen, associated with an episode of vomiting. Vomitus was none biliary, nonbloody, watery fluid. On Friday and Saturday pain waxed and waned but overall progressively got worse ,on Saturday she went to the [facility] for evaluation. CT scan done and she was admitted, however she was not allowed to drink anything that is when she decided to leave AMA. Next morning she went to [facility] as her pain got even worse, but had a conflict with the nurse that is when she decided to go to the [facility].  And cries she got symptomatic treatment and was sent home on antispasmodic medications. Around 2 PM today she started having even worse pain that is when she decided to come to the [facility]     Past Medical History:   Diagnosis Date    Arthritis     Asthma     exercise/cold induced    Blindness, legal     CVA (cerebral vascular accident) ([address] 2015    Depression     Diabetes mellitus ([address]     ION (ischemic optic neuropathy)     developed post-op r/t hypotension    Osteomyelitis ([address]     Left foot; finished Vanco. end .     TIA (transient ischemic attack)       Past Surgical History:   Procedure Laterality Date    BACK SURGERY       SECTION      CHOLECYSTECTOMY      ELBOW SURGERY Left     FOOT SURGERY Left 2014 INCISION AND DRAINAGE MID FOOT LEFT, ENDOSCOPIC GASTROC    FOOT SURGERY Left 07/17/2014    LEFT FOOT EXTERNAL FIXATOR REMOVAL        HYSTERECTOMY (CERVIX STATUS UNKNOWN)      ORTHOPEDIC SURGERY      OTHER SURGICAL HISTORY Left 04/18/2014    INCISION AND DRAINAGE LEFT FOOT    SOCORRO AND BSO (CERVIX REMOVED)            MEDICATION:  Admission Meds  No current facility-administered medications on file prior to encounter. Current Outpatient Medications on File Prior to Encounter   Medication Sig Dispense Refill    Potassium 99 MG TABS Take 1 tablet by mouth daily      MILK THISTLE PO Take 1 capsule by mouth daily      dicyclomine (BENTYL) 20 MG tablet Take 20 mg by mouth 3 times daily as needed (abdominal pain / cramping)      ondansetron (ZOFRAN-ODT) 4 MG disintegrating tablet Take 4 mg by mouth every 8 hours as needed for Nausea or Vomiting      B Complex Vitamins (VITAMIN B COMPLEX) TABS Take 1 each by mouth daily      vitamin D3 (CHOLECALCIFEROL) 10 MCG (400 UNIT) TABS tablet Take 800 Units by mouth daily      magnesium 30 MG tablet Take 60 mg by mouth daily 2 capsules      vitamin C (ASCORBIC ACID) 500 MG tablet Take 1,000 mg by mouth daily      zinc gluconate 50 MG tablet Take 50 mg by mouth daily      aspirin 81 MG EC tablet Take 243 mg by mouth daily      loratadine (CLARITIN) 10 MG tablet Take 10 mg by mouth daily as needed (allergies)           Allergies  Allergies   Allergen Reactions    Theophyllines Hives    Apixaban      Foggy and fatigued    Metformin Other (See Comments)     Gets angry and mcgrath    Morphine Nausea And Vomiting      Social   Social History     Tobacco Use    Smoking status: Never    Smokeless tobacco: Never   Substance Use Topics    Alcohol use: No        Family  Family History   Problem Relation Age of Onset    Stroke Mother     Stroke Maternal Grandfather     Diabetes Paternal Grandmother         Review of Systems:  Pertinent items are noted in HPI.          PHYSICAL EXAM:    /68 Pulse (!) 111   Temp (!) 100.7 °F (38.2 °C) (Oral)   Resp 18   Wt 256 lb 9.9 oz (116.4 kg)   SpO2 98%   BMI 44.05 kg/m²   General appearance:  appears stated age, in no acute distress  HEENT:  NCAT. No scleral icterus or conjunctival injection. EOMI by observation. No rhinorrhea. Moist, pink oral mucous membranes. Lungs: Able to speak in full sentences without pauses. No use of accessory muscles. CTAB. No perioral cyanosis. CVS:  No apparent JVD. RRR. No murmurs, rubs, or gallops. Radial pulses 2+. Capillary refill < 2 sec. Abdomen: abnormal findings:  distended and tenderness mild in the lower abdomen  Extremities: No upper extremity or lower extremity edema. Skin: Warm, dry, acyanotic. Neuro: Alert, awake, and oriented to person, time, place, and situation. Psych:  Cooperative. Normal speech, behavior, and thought content. LABS:    Recent Labs     03/06/23 1900 03/07/23  0549   WBC 8.6 9.7   HGB 14.1 12.5   HCT 42.2 36.8   MCV 84.0 83.9    160     Recent Labs     03/06/23 1900 03/07/23  0045 03/07/23  0549   * 133* 133*   K 4.2 4.5 4.1   CL 95* 96* 99   CO2 23 25 22   PHOS  --   --  2.8   BUN 14 11 10   CREATININE 0.7 0.7 0.6     Recent Labs     03/06/23 1900 03/07/23  0549   AST 40* 87*   ALT 86* 109*   BILIDIR <0.2 <0.2   BILITOT 0.7 0.6   ALKPHOS 146* 194*     Recent Labs     03/06/23 1900   LIPASE 14.0     No results for input(s): PROTIME, INR in the last 72 hours. No results for input(s): PTT in the last 72 hours. No results for input(s): OCCULTBLD in the last 72 hours. Assessment: This is a 30-year-old female with past medical history of CVA in 2015, type 2 diabetes, asthma, and depression presented with abdominal pain that started on Thursday and is currently being evaluated for elevated LFTs. LFT pattern is nonspecific.   However patient has mild fever and abdominal tenderness, total bili and lipase are normal.  Abdominal pain and vomiting may be due to DKA since presenting blood glucose was greater than 300 and ketones on UA. Abdominal ultrasound was normal.    Recommendations:  Keep n.p.o. Continue with Zofran to control nausea  Continue with IV fluids  Rule out any viral hepatitis  Rule out any fatty liver disease with abdominal CT  Rule out gastroparesis  Continue strict glycemic control   We will check GGT to rule out any biliary etiology of the elevated alkaline phosphatase    Patient discussed with     Thank you for the opportunity to participate in [facility].     Darlin Reyes MD   PGY-1, Internal Medicine Resident

## 2023-03-07 NOTE — CONSULTS
Clinical Pharmacy Progress Note  Medication History      Asked to verify home medications by Dr. Rebecca Gordillo. List of of current medications patient is taking is complete. Home Medication list in EPIC updated to reflect changes noted below. Source of information: patient interview, review of recent ED visits  Pt seems very knowledgeable about her home meds     Changes made to home medication list:   Medications removed (no longer taking):  none     Medications added:   Milk thistle - recently started taking for liver health  Dicyclomine - prescribed by [facility] ED 3/6. Pt states it provides some relief, but minimal.  Ondansetron - prescribed by [facility] 3/6. Pt states it provides no relief of symptoms.      Medication doses / instructions adjusted:   Aspirin - takes 81mg x3 tabs daily   Magnesium - takes 2 OTC caps daily  Loratadine - takes just prn  Potassium - takes 1 OTC tab (~99mg) daily  Vit D - takes 2 tabs daily     Please call with questions--  Thanks--  Marisela Blanco, PharmD, BCPS, [facility]  [id number] (\[facility]")   3/7/2023 9:24 AM      Current Outpatient Medications   Medication Instructions    aspirin 243 mg, Oral, DAILY    B Complex Vitamins (VITAMIN B COMPLEX) TABS 1 each, Oral, DAILY    dicyclomine (BENTYL) 20 mg, Oral, 3 TIMES DAILY PRN    loratadine (CLARITIN) 10 mg, Oral, DAILY PRN    magnesium 60 mg, Oral, DAILY, 2 capsules    MILK THISTLE PO 1 capsule, Oral, DAILY    ondansetron (ZOFRAN-ODT) 4 mg, Oral, EVERY 8 HOURS PRN    Potassium 99 MG TABS 1 tablet, Oral, DAILY    vitamin C (ASCORBIC ACID) 1,000 mg, Oral, DAILY    vitamin D3 (CHOLECALCIFEROL) 800 Units, Oral, DAILY    zinc gluconate 50 mg, Oral, DAILY

## 2023-03-07 NOTE — ED NOTES
ED TO INPATIENT SBAR HANDOFF    Patient Name: Elba Shabazz   :  1960  65 y.o. MRN:  [id number]  Preferred Name  Delmis Sofia  ED Room #:  X34/F05-82  Family/Caregiver Present yes   Restraints no   Sitter no   Sepsis Risk Score Sepsis Risk Score: 1.66    Situation  Code Status: Full code. Allergies: Theophyllines, Apixaban, Metformin, and Morphine  Weight: Patient Vitals for the past 96 hrs (Last 3 readings):   Weight   23 2200 247 lb 9.6 oz (112.3 kg)     Arrived from: home  Chief Complaint:   Chief Complaint   Patient presents with    Abdominal Pain     Pt reports pain that started last Thursday was seen at 2 EDs for the abdominal pain. Pt reports taking dicyclomine that has helped her pain. Was also discharged home with zofran that hasnt helped her pain. Pt reports pain that is unrelieved with the medication that she has been discharged with. Hospital Problem/Diagnosis:  Principal Problem:    Abdominal pain  Resolved Problems:    * No resolved hospital problems.  *    Imaging:   No orders to display     Abnormal labs:   Abnormal Labs Reviewed   CBC WITH AUTO DIFFERENTIAL - Abnormal; Notable for the following components:       Result Value    Lymphocytes Absolute 0.6 (*)     All other components within normal limits   BASIC METABOLIC PANEL W/ REFLEX TO MG FOR LOW K - Abnormal; Notable for the following components:    Sodium 131 (*)     Chloride 95 (*)     Glucose 338 (*)     All other components within normal limits   HEPATIC FUNCTION PANEL - Abnormal; Notable for the following components:    Alkaline Phosphatase 146 (*)     ALT 86 (*)     AST 40 (*)     All other components within normal limits   URINALYSIS WITH REFLEX TO CULTURE - Abnormal; Notable for the following components:    Glucose, Ur 500 (*)     Ketones, Urine >=80 (*)     Protein, UA TRACE (*)     All other components within normal limits   MICROSCOPIC URINALYSIS - Abnormal; Notable for the following components:    Bacteria, UA 1+ (*) All other components within normal limits     Critical values: no     Abnormal Assessment Findings: Right sided abdominal pain, she was seen at 2 outside ED's, and given bentyl with minimal relief of her pain. Background  History:   Past Medical History:   Diagnosis Date    Arthritis     Asthma     exercise/cold induced    Blindness, legal     [facility]cere[facility] 05/01/2015    Depression     Diabetes mellitus [facility]     ION (ischemic optic neuropathy)     developed post-op r/t hypotension    [facility]     Left foot; finished Vanco. end of June, 2014.     TIA (transient ischemic attack)        Assessment    Vitals/MEWS: MEWS Score: 2  Level of Consciousness: Alert (0)   Vitals:    03/06/23 1745 03/06/23 2200   BP: (!) 167/84 112/85   Pulse: (!) 107 (!) 103   Resp: 18 18   Temp: 100.4 °F (38 °C) 99.7 °F (37.6 °C)   TempSrc: Oral Oral   SpO2: 95% 95%   Weight:  247 lb 9.6 oz (112.3 kg)     FiO2 (%): RA  O2 Flow Rate: O2 Device: None (Room air)    Cardiac Rhythm: Cardiac Rhythm: Sinus tachy  Pain Assessment: 8/10 [x] Verbal [] Steve Darrius Scale  Pain Scale: Pain Assessment  Pain Assessment: 0-10  Pain Level: 8  Pain Location: Abdomen  Pain Orientation: Right, Upper  Pain Descriptors: Stabbing  Functional Pain Assessment: Activities are not prevented  Pain Type: Acute pain  Last documented pain score (0-10 scale) Pain Level: 8  Last documented pain medication administered: NA  Mental Status: oriented, alert, coherent, logical, thought processes intact and able to concentrate and follow conversation  Orientation Level:    NIH Score:    C-SSRS: Risk of Suicide: No Risk  Bedside swallow:    [place] Coma Scale (GCS): Autumn Coma Scale  Eye Opening: Spontaneous  Best Verbal Response: Oriented  Best Motor Response: Obeys commands  [place] Coma Scale Score: 15  Active LDA's:   Peripheral IV 03/06/23 Right Hand (Active)   Site Assessment Clean, dry & intact 03/06/23 4107   Line Status Blood return noted; Flushed;Normal saline locked 03/06/23 2149   Line Care Connections checked and tightened 03/06/23 2149   Phlebitis Assessment No symptoms 03/06/23 2149   Infiltration Assessment 0 03/06/23 2149   Dressing Status Clean, dry & intact 03/06/23 2149   Dressing Type Transparent 03/06/23 2149     PO Status: Regular  Pertinent or High Risk Medications/Drips: no   o If Yes, please provide details: none  Pending Blood Product Administration: no       You may also review the ED PT Care Timeline found under the Summary Nursing Index tab. Recommendation    Pending orders ED orders complete  Plan for Discharge (if known): Additional Comments: Patient lives at home with her , she has weakness on right side from a previous stroke and requires assistance x1 to get up out of bed.     If any further questions, please call Sending RN at [phone]    Electronically signed by: Electronically signed by Grazyna Gardner RN on 3/6/2023 at 10:57 PM       Grazyna Gardner RN  03/06/23 9401

## 2023-03-07 NOTE — H&P
Internal Medicine Note    Patient Name: Xiomara Figueroa Date: 3/6/2023   Code:Full Code  PCP: Miranda Vieira   Attending: Gabe Ureña MD    Chief Complaint: Abdominal Pain (Pt reports pain that started last Thursday was seen at 2 EDs for the abdominal pain. Pt reports taking dicyclomine that has helped her pain. Was also discharged home with zofran that hasnt helped her pain. Pt reports pain that is unrelieved with the medication that she has been discharged with.)       Subjective   HPI:   Don Silva is a 65 y.o. female, with PMHx of CVA, DM and obesity , and SMHx cholecystectomy in [de-identified], and hysterectomy presented to the ED with a continued abdominal pain for last 4 days    Patient reported that she started having abdominal pain on Thursday (4 days ago). It was acute in onset, progressively gotten worse, on the right upper quadrant felt like sharp tearing pain, and diffuse abdominal dull pain and rest of the abdomen, associated with an episode of vomiting. Vomitus was none biliary, nonbloody, watery fluid. On Friday and Saturday pain waxed and waned but overall progressively got worse ,on Saturday [facility] scan done and she was admitted, however she was not allowed to drink anything that is when she decided to leave AMA. Next morning she went to [facility] as her pain got even worse, but had a conflict with the nurse that is when she decided to go to the [facility].  And cries she got symptomatic treatment and was sent home on antispasmodic medications. Around 2 PM today she started having even worse pain that is when she decided to come to the [facility]     Patient believes she had been having flulike symptoms, and had sweats chills 1 day and last 4 days which got resolved by itself.       On review of system she reported that she usually has a whole week of constipation and it gets resolved the next week however she denies any fever, diarrhea, urinary symptoms, recent weight gain, or weight loss. She denies any cold or heat intolerance. ROS:  As per HPI    ED Course:  Oriented x 3, NAD  Vitals: BP of  167/84, HR of 107, Temp of 100.4, SpO2 of 95  Physical Exam:   Labs: Na 131, K4.2, Glucose 338, LFTs: , , AST 40, CBC WNL, UA showed ketones and Glucose  Imaging: KUB; no evidence of obstruction, Retained stone the right hemicolon. Air is present in the left hemicolon  Treatment: Zosyn, LR, Dilaudid, Dicyclomine    Summary of Clinical Encounters:  23: [facility] for ABD PAIN, CT was done which was mainly unremarkable but it did show 2.3 and 1.3 cm right and left adrenal nodules are indeterminate. Diagnostic Studies:      Past Medical Hx:      Diagnosis Date    Arthritis     Asthma     exercise/cold induced    Blindness, legal     CVA (cerebral vascular accident) ([facility] [address] 2015    Depression     Diabetes mellitus ([facility] [address]     ION (ischemic optic neuropathy)     developed post-op r/t hypotension    Osteomyelitis ([facility] [address]     Left foot; finished Vanco. end of . TIA (transient ischemic attack)        Past Surgical Hx:      Procedure Laterality Date    BACK SURGERY       SECTION      CHOLECYSTECTOMY      ELBOW SURGERY Left     FOOT SURGERY Left 2014    INCISION AND DRAINAGE MID FOOT LEFT, ENDOSCOPIC GASTROC    FOOT SURGERY Left 2014    LEFT FOOT EXTERNAL FIXATOR REMOVAL        HYSTERECTOMY (CERVIX STATUS UNKNOWN)      ORTHOPEDIC SURGERY      OTHER SURGICAL HISTORY Left 2014    INCISION AND DRAINAGE LEFT FOOT    SOCORRO AND BSO (CERVIX REMOVED)          Home Medication:  Prior to Admission medications    Medication Sig Start Date End Date Taking?  Authorizing Provider   POTASSIUM CHLORIDE PO Take 1 each by mouth daily    Historical Provider, MD   B Complex Vitamins (VITAMIN B COMPLEX) TABS Take 1 each by mouth daily    Historical Provider, MD vitamin D3 (CHOLECALCIFEROL) 10 MCG (400 UNIT) TABS tablet Take 400 Units by mouth daily    Historical Provider, MD   magnesium 30 MG tablet Take 30 mg by mouth daily 2 capsules    Historical Provider, MD   vitamin C (ASCORBIC ACID) 500 MG tablet Take 1,000 mg by mouth daily    Historical Provider, MD   zinc gluconate 50 MG tablet Take 50 mg by mouth daily    Historical Provider, MD   aspirin 325 MG tablet Take 325 mg by mouth daily    Historical Provider, MD   loratadine (CLARITIN) 10 MG tablet Take 1 tablet by mouth daily 5/26/15   Yobani Diaz MD       Allergies: Allergies   Allergen Reactions    Theophyllines Hives    Apixaban      Foggy and fatigued    Metformin Other (See Comments)     Gets angry and mcgrath    Morphine Nausea And Vomiting       Social Hx:  Social History     Socioeconomic History    Marital status:    Tobacco Use    Smoking status: Never    Smokeless tobacco: Never   Substance and Sexual Activity    Alcohol use: No    Drug use: No        Family Hx:      Problem Relation Age of Onset    Stroke Mother     Stroke Maternal Grandfather     Diabetes Paternal Grandmother          Objective   Vital Signs:  Patient Vitals for the past 8 hrs:   BP Temp Temp src Pulse Resp SpO2 Weight   03/07/23 0249 105/68 100.2 °F (37.9 °C) Oral (!) 109 18 93 % --   03/07/23 0055 127/64 (!) 100.5 °F (38.1 °C) Oral (!) 109 18 93 % --   03/07/23 0005 (!) 167/76 -- -- (!) 102 18 96 % --   03/06/23 2230 (!) 144/98 -- -- (!) 102 18 94 % --   03/06/23 2200 112/85 99.7 °F (37.6 °C) Oral (!) 103 18 95 % 247 lb 9.6 oz (112.3 kg)     Physical Exam  Constitutional:       Appearance: She is obese. She is ill-appearing. HENT:      Head: Normocephalic and atraumatic. Eyes:      Extraocular Movements: Extraocular movements intact. Conjunctiva/sclera: Conjunctivae normal.      Pupils: Pupils are equal, round, and reactive to light. Cardiovascular:      Rate and Rhythm: Tachycardia present.       Pulses: Normal pulses. Heart sounds: Normal heart sounds. No murmur heard. No gallop. Pulmonary:      Effort: Pulmonary effort is normal. No respiratory distress. Breath sounds: Normal breath sounds. No wheezing or rales. Abdominal:      General: Abdomen is flat. Bowel sounds are normal.      Palpations: Abdomen is soft. Tenderness: There is abdominal tenderness (Sharp pain RUQ, diffuse tenderness in the rest of the ABD). Musculoskeletal:      Cervical back: Normal range of motion and neck supple. Right lower leg: Edema (Trace edema) present. Left lower leg: Edema (trace edema) present. Skin:     General: Skin is warm. Capillary Refill: Capillary refill takes less than 2 seconds. Neurological:      General: No focal deficit present. Mental Status: She is alert and oriented to person, place, and time. Mental status is at baseline. Cranial Nerves: No cranial nerve deficit. Sensory: No sensory deficit. Motor: Weakness (Rt sided weakness from previous stroke) present. Psychiatric:         Mood and Affect: Mood normal.         Behavior: Behavior normal.      Labs:  CBC:   Recent Labs     03/06/23  1900   WBC 8.6   HGB 14.1   HCT 42.2          BMP:   Recent Labs     03/06/23  1900 03/07/23  0045   * 133*   K 4.2 4.5   CL 95* 96*   CO2 23 25   BUN 14 11   CREATININE 0.7 0.7   GLUCOSE 338* 287*    Magnesium: No results for input(s): MG in the last 72 hours. LFT's:   Recent Labs     03/06/23  1900   AST 40*   ALT 86*   BILITOT 0.7   ALKPHOS 146*     INR: No results for input(s): INR in the last 72 hours. COVID-19:   Recent Labs     03/06/23  2214   COVID19 NOT DETECTED     Radiology:  XR ABDOMEN (KUB) (SINGLE AP VIEW)   Final Result   Addendum (preliminary) 1 of 1      Addendum: By Dr. Angle Schmitt of typographical error      Within the body the report, the statement should read the following. Retained stool within the right hemicolon.  Air is present in the left    hemicolon. No evidence of fecal impaction         Final   1. No evidence of obstruction              Medications:   sodium chloride flush  5-40 mL IntraVENous 2 times per day    enoxaparin  30 mg SubCUTAneous BID    insulin lispro  0-4 Units SubCUTAneous TID WC    miconazole   Topical BID    insulin lispro  0-4 Units SubCUTAneous Nightly    piperacillin-tazobactam  3,375 mg IntraVENous Q8H    dicyclomine  20 mg IntraMUSCular Once       sodium chloride      dextrose      sodium chloride               Assessment & Plan   Benjy March is a 65 y.o. female, with PMHx of CVA, DM and obesity , and SHx cholecystectomy in 80s, and hysterectomy presented to the ED with a continued abdominal pain for last 4 days    Acute abdominal pain  Ddx: Gastroparesis, intra-abdominal infection  Have continued abdominal pain for last 4 days, tried multiple antispasmodic and painkillers, pain is progressively getting worse. Alk 146, ALT 86, AST 40. CT abdomen 2 days ago was mainly unremarkable.  -N.p.o.   -Antispasmodic dicyclomine  -Antiemetic Zofran  -IV fluids  -Zosyn  -Consult general surgery: Appreciate recommendations  -Consider getting MRI abdomen to rule out any intra-abdominal infection/Dropped gallstones?  -Consulting GI    Hyperglycemia  Likely secondary to uncontrolled diabetes  Most recent HbA1c is 8.7 12/6/2022.   Patient does not report taking any medication for diabetes  Hyperglycemia of  -POCT glucose 338 in the ED  -Hypoglycemia protocol  -Low-dose sliding scale insulin    DVT PPx: Lovenox  Diet: Diet NPO   Code status:  Full Code   ELOS: 2 days  Barriers to discharge: Evaluation for abdominal pain  Disposition  - Preadmission: Home  - Current: Medical floor  - Upon discharge: Home    Will discuss with attending physician Elida Rodriguez MD  ________________________  Maria Isabel Lemus MD,   PGY-1, Internal Medicine  03/07/23  3:15 AM

## 2023-03-07 NOTE — PROGRESS NOTES
4 Eyes Admission Assessment     I agree as the admission nurse that 2 RN's have performed a thorough Head to Toe Skin Assessment on the patient. ALL assessment sites listed below have been assessed on admission. Areas assessed by both nurses:   [x]   Head, Face, and Ears   [x]   Shoulders, Back, and Chest  [x]   Arms, Elbows, and Hands   [x]   Coccyx, Sacrum, and Ischium  [x]   Legs, Feet, and Heels        Does the Patient have Skin Breakdown?   Yes a wound was noted on the Admission Assessment and an LDA was Initiated documentation include the Donna-wound, Wound Assessment, Measurements, Dressing Treatment, Drainage, and Color\",         Jonnathan Prevention initiated:  Yes   Wound Care Orders initiated:  No      WOC nurse consulted for Pressure Injury (Stage 3,4, Unstageable, DTI, NWPT, and Complex wounds) or Jonnathan score 18 or lower:  No      Nurse 1 eSignature: Electronically signed by Joshua Gutierrez RN on 3/7/23 at 5:13 AM EST    **SHARE this note so that the co-signing nurse is able to place an eSignature**    Nurse 2 eSignature: {Esignature:[id number]}

## 2023-03-07 NOTE — CONSULTS
General Surgery   Resident Consult Note    Reason for Consult: abdominal pain    History of Present Illness:   Don Silva is a 65 y.o. female with Hx of DM, CVA, obesity and prior cholecystectomy in the [de-identified]. Patient has been struggling with abdominal pain since 5 days ago. She reports emesis at that time. She went to [facility] emergency department yesterday morning and was evaluated for abdominal pain including CT and reportedly no abnormalities were found. She was discharged with dicyclomine and Zofran, which initially seemed to help her symptoms, but the pain started to recur. Therefore, this morning she went to [address] [place] after she felt their focus was on her heart rather than her abdomen, and then went to [facility] where they told her to continue taking zofran and dicyclomine. At [facility], patient reports her pain is primarily in her right flank. She is complaining of bloating, loss of appetite. Was able to eat a burrito this morning without emesis. Reports belching and passing some gas. Last BM was a few days ago and substantial.     Past Medical History:        Diagnosis Date    Arthritis     Asthma     exercise/cold induced    Blindness, legal     CVA (cerebral vascular accident) ([facility] [address] 2015    Depression     Diabetes mellitus ([address]     ION (ischemic optic neuropathy)     developed post-op r/t hypotension    Osteomyelitis ([address]     Left foot; finished Vanco. end of .     TIA (transient ischemic attack)        Past Surgical History:        Procedure Laterality Date    BACK SURGERY       SECTION      CHOLECYSTECTOMY      ELBOW SURGERY Left     FOOT SURGERY Left 2014    INCISION AND DRAINAGE MID FOOT LEFT, ENDOSCOPIC GASTROC    FOOT SURGERY Left 2014    LEFT FOOT EXTERNAL FIXATOR REMOVAL        HYSTERECTOMY (CERVIX STATUS UNKNOWN)      ORTHOPEDIC SURGERY      OTHER SURGICAL HISTORY Left 2014    INCISION AND DRAINAGE LEFT FOOT    SOCORRO AND BSO (CERVIX REMOVED)         Allergies: Theophyllines, Apixaban, Metformin, and Morphine    Medications:   Home Meds  No current facility-administered medications on file prior to encounter. Current Outpatient Medications on File Prior to Encounter   Medication Sig Dispense Refill    POTASSIUM CHLORIDE PO Take 1 each by mouth daily      B Complex Vitamins (VITAMIN B COMPLEX) TABS Take 1 each by mouth daily      vitamin D3 (CHOLECALCIFEROL) 10 MCG (400 UNIT) TABS tablet Take 400 Units by mouth daily      magnesium 30 MG tablet Take 30 mg by mouth daily 2 capsules      vitamin C (ASCORBIC ACID) 500 MG tablet Take 1,000 mg by mouth daily      zinc gluconate 50 MG tablet Take 50 mg by mouth daily      aspirin 325 MG tablet Take 325 mg by mouth daily      loratadine (CLARITIN) 10 MG tablet Take 1 tablet by mouth daily         Current Meds  lactated ringers bolus, Once  dicyclomine (BENTYL) injection 20 mg, Once  piperacillin-tazobactam (ZOSYN) 3,375 mg in sodium chloride 0.9 % 50 mL IVPB (mini-bag), Once        Family History:   Family History   Problem Relation Age of Onset    Stroke Mother     Stroke Maternal Grandfather     Diabetes Paternal Grandmother        Social History:   TOBACCO:   reports that she has never smoked. She has never used smokeless tobacco.  ETOH:   reports no history of alcohol use. DRUGS:   reports no history of drug use. Review of Systems:   A 14 point review of systems was conducted, significant findings as noted in HPI. All other systems negative.      Physical exam:    Vitals:    03/06/23 1745 03/06/23 2200   BP: (!) 167/84 112/85   Pulse: (!) 107 (!) 103   Resp: 18 18   Temp: 100.4 °F (38 °C) 99.7 °F (37.6 °C)   TempSrc: Oral Oral   SpO2: 95% 95%   Weight:  247 lb 9.6 oz (112.3 kg)       General appearance: alert, uncomfortable, anxious, grooming appropriate  Eyes: No scleral icterus, EOM grossly intact  Neck: trachea midline, neck supple  Chest/Lungs: normal effort with no accessory muscle use on RA  Cardiovascular: mildly tachycardic, well perfused  Abdomen: Obese, moderate tenderness in the right flank area, no rebound, voluntary guarding, no rigidity  Skin: warm and dry, no rashes  Extremities: no edema, no cyanosis  Genitourinary: deferred  Neuro: A&Ox3, no focal deficits, sensation intact    Labs:    CBC:   Recent Labs     03/06/23  1900   WBC 8.6   HGB 14.1   HCT 42.2   MCV 84.0        BMP:   Recent Labs     03/06/23  1900   *   K 4.2   CL 95*   CO2 23   BUN 14   CREATININE 0.7     PT/INR: No results for input(s): PROTIME, INR in the last 72 hours. APTT: No results for input(s): APTT in the last 72 hours. Liver Profile:   Lab Results   Component Value Date/Time    AST 40 03/06/2023 07:00 PM    ALT 86 03/06/2023 07:00 PM    BILIDIR <0.2 03/06/2023 07:00 PM    BILITOT 0.7 03/06/2023 07:00 PM    ALKPHOS 146 03/06/2023 07:00 PM     Lab Results   Component Value Date/Time    CHOL 209 05/25/2015 06:21 AM    HDL 40 05/25/2015 06:21 AM    TRIG 144 05/25/2015 06:21 AM     UA:   Lab Results   Component Value Date/Time    COLORU Yellow 03/06/2023 08:16 PM    PHUR 6.0 03/06/2023 08:16 PM    WBCUA 3-5 03/06/2023 08:16 PM    RBCUA 3-4 03/06/2023 08:16 PM    BACTERIA 1+ 03/06/2023 08:16 PM    CLARITYU Clear 03/06/2023 08:16 PM    SPECGRAV 1.020 03/06/2023 08:16 PM    LEUKOCYTESUR Negative 03/06/2023 08:16 PM    UROBILINOGEN 0.2 03/06/2023 08:16 PM    BILIRUBINUR Negative 03/06/2023 08:16 PM    BLOODU Negative 03/06/2023 08:16 PM    GLUCOSEU 500 03/06/2023 08:16 PM       Imaging:   No orders to display         Assessment/Plan: This is a 65 y.o. female with Hx of DM, CVA, obesity and prior cholecystectomy presenting with right flank/abdominal pain. Patient with extensive work up across [address] [facility] in past few days. Currently anxious, mildly tachycardic with fairly benign clinical exam and mild elevation in LFTs.      Will attempt to obtain OSH CT images   KUB   Recommend medical admission for pain control, IVF, anti-emetics  Consider GI consult given refractory abdominal pain and mildly elevated LFTs.          Renay Leventhal, MD  03/06/23  11:07 PM

## 2023-03-07 NOTE — PROGRESS NOTES
PT HR sustaining in 170s on tele. Medical resident notified via perfect serve. STAT EKG ordered. Pt denies chest pain or SOB.

## 2023-03-07 NOTE — PROGRESS NOTES
Rapid called around [phone] for HR sustaining in 180. STAT EKG showed SVT. Pt given Adenosine 12mg at 0653 and another Adenisone 6mg at 0656. 5mg Metoprolol 0659.    0658 vitals  79/47    RR 22     am  97/68      703 am 87/55    705 am 5mg Metoprolol     0710 5mg Metoprolol     Pt given noticed to have infiltrated. Multiple nurses tried to regain access without success. Decision was made to place stat midline order and transfer to PCU. Report called to nurse and call questions answered.

## 2023-03-07 NOTE — ED PROVIDER NOTES
[address]          EM RESIDENT NOTE       Date of evaluation: 3/6/2023    Chief Complaint     Abdominal Pain (Pt reports pain that started last Thursday was seen at 2 EDs for the abdominal pain. Pt reports taking dicyclomine that has helped her pain. Was also discharged home with zofran that hasnt helped her pain. Pt reports pain that is unrelieved with the medication that she has been discharged with.)      of Present Illness     Don Silva is a 65 y.o. female with past medical history of type 2 diabetes, stroke with residual dysarthria, some weakness and confusion, depression, and previous skin ulcers likely secondary to diabetes who presents emergency department for ongoing abdominal pain for the past 4 days with some nausea. Patient states that her abdominal pain started approximately 4 days ago after eating a cheeseburger, with abdominal cramping and subsequent shivering. Patient states that been 2 days in a row, with subsequent worsening abdominal pain over the past 2 days, and intermittent nausea with NBNB emesis. Patient states that the abdominal pain is now in the right upper quadrant, with severe tenderness when moving. She states is a 10/10 in severity, keeping her up at night, and causing her significant distress. She states she has never had symptoms like this in the past.  She has been seen in the ED x3, worked up, and underwent CT imaging of the abdomen/pelvis as well as a CTPA without any abnormalities. Patient presents the ED today hoping for a possible diagnostic cancer as well as symptom control. She states she gets some good symptom control with Bentyl. Patient states he is also have noticed a fever over the past few days, initially 101.6, now 100.4 today. Review of Systems       All other systems were reviewed and were negative.     Past Medical, Surgical, Family, and Social History     She has a past medical history of Arthritis, Asthma, Blindness, legal, CVA (cerebral vascular accident) ([facility] [address] Depression, Diabetes mellitus ([facility] [address] ION (ischemic optic neuropathy), Osteomyelitis ([facility] [address] and TIA (transient ischemic attack). She has a past surgical history that includes  section; Cholecystectomy; orthopedic surgery; back surgery; Hysterectomy; other surgical history (Left, 2014); Foot surgery (Left, 2014); Foot surgery (Left, 2014); Total abdominal hysterectomy w/ bilateral salpingoophorectomy; and Elbow surgery (Left). Her family history includes Diabetes in her paternal grandmother; Stroke in her maternal grandfather and mother. She reports that she has never smoked. She has never used smokeless tobacco. She reports that she does not drink alcohol and does not use drugs. Medications     Current Discharge Medication List        CONTINUE these medications which have NOT CHANGED    Details   POTASSIUM CHLORIDE PO Take 1 each by mouth daily      B Complex Vitamins (VITAMIN B COMPLEX) TABS Take 1 each by mouth daily      vitamin D3 (CHOLECALCIFEROL) 10 MCG (400 UNIT) TABS tablet Take 400 Units by mouth daily      magnesium 30 MG tablet Take 30 mg by mouth daily 2 capsules      vitamin C (ASCORBIC ACID) 500 MG tablet Take 1,000 mg by mouth daily      zinc gluconate 50 MG tablet Take 50 mg by mouth daily      aspirin 325 MG tablet Take 325 mg by mouth daily      loratadine (CLARITIN) 10 MG tablet Take 1 tablet by mouth daily             Allergies     She is allergic to theophyllines, apixaban, metformin, and morphine. Physical Exam     INITIAL VITALS: BP: (!) 167/84, Temp: 100.4 °F (38 °C), Heart Rate: (!) 107, Resp: 18, SpO2: 95 %     General: Chronically ill-appearing, obese, sitting in the chair in acute pain, intermittently writhing around. Eyes:  PERRL. EOMI. No discharge from eyes   ENT:  No discharge from nose. OP clear  Neck:  Supple, trachea midline  Pulmonary:   Non-labored breathing.  Breath sounds clear bilaterally  Cardiac:  Regular rate and rhythm. No murmurs/rubs/gallops  Abdomen: Soft. Significant tenderness to palpation in the right upper quadrant without rebound tenderness or guarding. Musculoskeletal:  No long bone deformity. Vascular:  Extremities warm and perfused. Normal pulses in all 4 extremities  Skin:  Dry, no rashes. Chronic ulcer of the toe without any overlying erythema or purulence to suggest infection. Extremities:  No peripheral edema  Neuro:  Alert and oriented x4. Moves all four extremities to command. Sensation intact light touch. DiagnosticResults       RADIOLOGY:  Please see EMR for imaging obtained during this patient encounter.     LABS:   Results for orders placed or performed during the hospital encounter of 03/06/23   COVID-19 & Influenza Combo    Specimen: Nasopharyngeal Swab   Result Value Ref Range    SARS-CoV-2 RNA, RT PCR NOT DETECTED NOT DETECTED    INFLUENZA A NOT DETECTED NOT DETECTED    INFLUENZA B NOT DETECTED NOT DETECTED   CBC with Auto Differential   Result Value Ref Range    WBC 8.6 4.0 - 11.0 K/uL    RBC 5.03 4.00 - 5.20 M/uL    Hemoglobin 14.1 12.0 - 16.0 g/dL    Hematocrit 42.2 36.0 - 48.0 %    MCV 84.0 80.0 - 100.0 fL    MCH 27.9 26.0 - 34.0 pg    MCHC 33.3 31.0 - 36.0 g/dL    RDW 13.9 12.4 - 15.4 %    Platelets 859 138 - 737 K/uL    MPV 8.2 5.0 - 10.5 fL    Neutrophils % 84.9 %    Lymphocytes % 6.8 %    Monocytes % 8.0 %    Eosinophils % 0.1 %    Basophils % 0.2 %    Neutrophils Absolute 7.3 1.7 - 7.7 K/uL    Lymphocytes Absolute 0.6 (L) 1.0 - 5.1 K/uL    Monocytes Absolute 0.7 0.0 - 1.3 K/uL    Eosinophils Absolute 0.0 0.0 - 0.6 K/uL    Basophils Absolute 0.0 0.0 - 0.2 K/uL   BMP w/ Reflex to MG   Result Value Ref Range    Sodium 131 (L) 136 - 145 mmol/L    Potassium reflex Magnesium 4.2 3.5 - 5.1 mmol/L    Chloride 95 (L) 99 - 110 mmol/L    CO2 23 21 - 32 mmol/L    Anion Gap 13 3 - 16    Glucose 338 (H) 70 - 99 mg/dL    BUN 14 7 - 20 mg/dL    Creatinine 0.7 0.6 - 1.2 mg/dL    Est, Glom Filt Rate >60 >60    Calcium 10.1 8.3 - 10.6 mg/dL   Hepatic Function Panel   Result Value Ref Range    Total Protein 7.3 6.4 - 8.2 g/dL    Albumin 3.7 3.4 - 5.0 g/dL    Alkaline Phosphatase 146 (H) 40 - 129 U/L    ALT 86 (H) 10 - 40 U/L    AST 40 (H) 15 - 37 U/L    Total Bilirubin 0.7 0.0 - 1.0 mg/dL    Bilirubin, Direct <0.2 0.0 - 0.3 mg/dL    Bilirubin, Indirect see below 0.0 - 1.0 mg/dL   Lipase   Result Value Ref Range    Lipase 14.0 13.0 - 60.0 U/L   Urinalysis with Reflex to Culture    Specimen: Urine   Result Value Ref Range    Color, UA Yellow Straw/Yellow    Clarity, UA Clear Clear    Glucose, Ur 500 (A) Negative mg/dL    Bilirubin Urine Negative Negative    Ketones, Urine >=80 (A) Negative mg/dL    Specific Gravity, UA 1.020 1.005 - 1.030    Blood, Urine Negative Negative    pH, UA 6.0 5.0 - 8.0    Protein, UA TRACE (A) Negative mg/dL    Urobilinogen, Urine 0.2 <2.0 E.U./dL    Nitrite, Urine Negative Negative    Leukocyte Esterase, Urine Negative Negative    Microscopic Examination YES     Urine Type NotGiven     Urine Reflex to Culture Not Indicated    Microscopic Urinalysis   Result Value Ref Range    WBC, UA 3-5 0 - 5 /HPF    RBC, UA 3-4 0 - 4 /HPF    Epithelial Cells, UA 2-5 0 - 5 /HPF    Bacteria, UA 1+ (A) None Seen /HPF   Basic Metabolic Panel   Result Value Ref Range    Sodium 133 (L) 136 - 145 mmol/L    Potassium 4.5 3.5 - 5.1 mmol/L    Chloride 96 (L) 99 - 110 mmol/L    CO2 25 21 - 32 mmol/L    Anion Gap 12 3 - 16    Glucose 287 (H) 70 - 99 mg/dL    BUN 11 7 - 20 mg/dL    Creatinine 0.7 0.6 - 1.2 mg/dL    Est, Glom Filt Rate >60 >60    Calcium 9.3 8.3 - 10.6 mg/dL   POCT Glucose   Result Value Ref Range    POC Glucose 261 (H) 70 - 99 mg/dl    Performed on ACCU-CHEK    POCT Glucose   Result Value Ref Range    POC Glucose 313 (H) 70 - 99 mg/dl    Performed on ACCU-CHEK          RECENT VITALS:  BP: 127/64, Temp: (!) 100.5 °F (38.1 °C), Heart Rate: (!) 109,Resp: 18, SpO2: 93 %       ED Course     Nursing Notes, Past Medical Hx, Past Surgical Hx, Social Hx, Allergies, and Family Hx were reviewed. The patient was given the followingmedications:  Orders Placed This Encounter   Medications    DISCONTD: doxyLAMINE succinate (GNP SLEEP AID) tablet 25 mg    lactated ringers bolus    dicyclomine (BENTYL) injection 20 mg    piperacillin-tazobactam (ZOSYN) 3,375 mg in sodium chloride 0.9 % 50 mL IVPB (mini-bag)     Order Specific Question:   Antimicrobial Indications     Answer:   Intra-Abdominal Infection    HYDROmorphone (DILAUDID) injection 0.5 mg    sodium chloride flush 0.9 % injection 5-40 mL    sodium chloride flush 0.9 % injection 5-40 mL    0.9 % sodium chloride infusion    enoxaparin Sodium (LOVENOX) injection 30 mg     Order Specific Question:   Indication of Use     Answer:   Prophylaxis-DVT/PE    OR Linked Order Group     ondansetron (ZOFRAN-ODT) disintegrating tablet 4 mg     ondansetron (ZOFRAN) injection 4 mg    polyethylene glycol (GLYCOLAX) packet 17 g    OR Linked Order Group     acetaminophen (TYLENOL) tablet 650 mg     acetaminophen (TYLENOL) suppository 650 mg    glucose chewable tablet 16 g    OR Linked Order Group     dextrose bolus 10% 125 mL     dextrose bolus 10% 250 mL    glucagon injection 1 mg    dextrose 10 % infusion    insulin lispro (1 Unit Dial) (HUMALOG/ADMELOG) pen 0-4 Units    DISCONTD: insulin lispro (1 Unit Dial) (HUMALOG/ADMELOG) pen 0-4 Units    miconazole (MICOTIN) 2 % powder    insulin lispro (1 Unit Dial) (HUMALOG/ADMELOG) pen 0-4 Units       CONSULTS:  IP CONSULT TO HOSPITALIST  IP CONSULT TO GENERAL SURGERY  IP CONSULT TO [facility] / ASSESSMENT / Julia Duval is a 65 y.o. female with past medical history of type 2 diabetes, previous stroke, and skin ulcers secondary to her diabetes who presents to the emergency department for ongoing abdominal pain for past 4 days.   Patient appeared chronically ill-appearing, obese, febrile, and tachycardic. Patient has been seen multiple times for complaints over the past 4 days, did not have any abnormalities of her imaging, and has had previous cholecystectomy, therefore lower suspicion for cholecystitis. Patient's work-up was notable for significant right upper quadrant tenderness, with ongoing mildly elevated transaminitis and elevated alkaline phosphatase. Patient CBC is unremarkable. She did have a glucose of 338, but did not appear to be in DKA, with otherwise unremarkable electrolytes. Patient's urine did not show any signs of infection. Patient did not have any repeat imaging done given that she had imaging done yesterday, with ongoing and similar symptoms. Low suspicion for any additional findings on imaging today. There is concern with patient's previous history of cholecystectomy, and now with fever, and transaminitis with elevated alkaline phosphatase that she may have a retained stone and a remnant cystic duct. For she was given empiric dose of antibiotics, surgery was consulted, and she will be admitted to medicine for further evaluation and management of fever in the setting of right upper quadrant abdominal tenderness and transaminitis with elevated alkaline phosphatase. Medical Decision Making  Problems Addressed:  Abdominal pain, unspecified abdominal location: acute illness or injury  Transaminitis: acute illness or injury    Amount and/or Complexity of Data Reviewed  External Data Reviewed: labs, radiology and notes. Labs: ordered. Decision-making details documented in ED Course. Risk  Prescription drug management. Decision regarding hospitalization. This patient was also evaluated by the attending physician. All care plans werediscussed and agreed upon. At this time the patient has been admitted to medicine for further evaluation and management of RUQ pain.  The patient will continue to be monitored here in the emergency department until which time she is moved to her new treatment location. Clinical Impression     1. Abdominal pain, unspecified abdominal location    2. Transaminitis        Disposition     PATIENT REFERRED TO:  No follow-up provider specified.     DISCHARGE MEDICATIONS:  Current Discharge Medication List          DISPOSITION Admitted 03/06/2023 10:56:33 PM      Obed Garcia MD  03/07/23 3755

## 2023-03-07 NOTE — CARE COORDINATION
Case Management Assessment  Initial Evaluation    Date/Time of Evaluation: 3/7/2023 6:23 PM  Assessment Completed by: Rafal Cavanaugh    If patient is discharged prior to next notation, then this note serves as note for discharge by case management. Patient Name: Ainsley Shipman                   YOB: 1960  Diagnosis: Abdominal pain [R10.9]  Transaminitis [R74.01]  Abdominal pain, unspecified abdominal location [R10.9]                   Date / Time: 3/6/2023  5:45 PM    Patient Admission Status: Inpatient   Readmission Risk (Low < 19, Mod (19-27), High > 27): Readmission Risk Score: 15.7    Current PCP: Deanne Dodson  PCP verified by CM? No    Chart Reviewed: Yes      History Provided by: Patient  Patient Orientation: Alert and Oriented    Patient Cognition: Alert    Hospitalization in the last 30 days (Readmission):  No    If yes, Readmission Assessment in CM Navigator will be completed. Advance Directives:      Code Status: Full Code   Patient's Primary Decision Maker is: Patient Declined (Legal Next of Kin Remains as Decision Maker)      Discharge Planning:    Patient lives with: Spouse/Significant Other Type of Home: House  Primary Care Giver: Self  Patient Support Systems include: Spouse/Significant Other   Current Financial resources: None  Current community resources: None  Current services prior to admission: Durable Medical Equipment            Current DME: Shower Chair, Walker, Other (Comment) (electric scooter)            Type of Home Care services:  OT, Nursing Services, PT    ADLS  Prior functional level: Assistance with the following:, Housework, Shopping  Current functional level: Assistance with the following:, Bathing, Cooking, Housework, Shopping, Mobility    PT AM-PAC:   /24  OT AM-PAC:   /24    Family can provide assistance at DC: Yes  Would you like Case Management to discuss the discharge plan with any other family members/significant others, and if so, who?  No  Plans to Return to Present Housing: Yes  Other Identified Issues/Barriers to RETURNING to current housing: none  Potential Assistance needed at discharge: Home Care            Potential DME:    Patient expects to discharge to: [address] [place] [address] for transportation at discharge:      Financial    Payor: Chanda Cho / Plan: Chanda Cho / Product Type: *No Product type* /     Does insurance require precert for SNF: Yes    Potential assistance Purchasing Medications: No  Meds-to-Beds request:        [facility], [address] [address]  Phone: [phone] Fax: [phone]    [id number] [place], [address] - P [phone] - F [phone]  [address] [place] [address]  Phone: [phone] Fax: [phone]      Notes:    Factors facilitating achievement of predicted outcomes: Family support, Motivated, Cooperative, Pleasant, Good insight into deficits, and Has needed Durable Medical Equipment at home    Barriers to discharge: Decreased endurance and Medical complications    Additional Case Management Notes:     CM following for discharge planning. CM spoke with pt and , Emelina Gusman, at bedside. Pt lives at home with . Has had [facility] years ago, but can not recall company. Pt hopeful she will not need any services at time of DC. Home has ramp access which pt can use electric scooter to enter. Pt states she has all necessary DME.  to provide transport home. Likely no CM needs. The Plan for Transition of Care is related to the following treatment goals of Abdominal pain [R10.9]  Transaminitis [R74.01]  Abdominal pain, unspecified abdominal location [N02.5]    IF APPLICABLE: The Patient and/or patient representative Araceli Juarez and her family were provided with a choice of provider and agrees with the discharge plan.  Freedom of choice list with basic dialogue that supports the patient's individualized plan of care/goals and shares the quality data associated with the providers was provided to: Patient   Patient Representative Name:       The Patient and/or Patient Representative Agree with the Discharge Plan?  Yes    Saul Jefferson  Case Management Department  Ph: [phone]

## 2023-03-07 NOTE — PROCEDURES
TRIMMABLE POWER MIDLINE PROCEDURE NOTE  Chart reviewed for allergies, diagnosis, labs, known contraindications, reason for line placement and planned length of treatment. Insertion procedure discussed with patient/family member. Informed consent not required for Midline placement. Three patient identifiers - Patient name,   and MRN -  completed &  confirmed verbally. Hat, mask and eye shield donned. Midline site scrubbed with Chloraprep  One-Step applicator  for 30 seconds x 1. Hand Hygiene  performed with 3% Chlorhexidine surgical scrub x1 min prior to  Sterile gloves, sterile gown being donned. Patient draped using maximal sterile barrier technique ( head to toe ). Midline site scrubbed a 2nd time with Chloraprep One-Step applicator x 30 sec. Vein located by Motility Count Sound and site marked with sterile pen. 1% Lidocaine 5 ml injected intradermal pre-insertion. Midline inserted. Positive brisk blood return obtained. Valve applied to lumen. Midline flushed with 10 mls  0.9% Sterile Sodium Chloride. Midline flushes easily with no resistance. Skin prep applied to site. Catheter secured with non-sutured locking device per hospital protocol. Bio-patch/CHG impregnated sterile tegaderm dressing applied. Alcohol Swab Caps applied to valve. Sterile field maintained during procedure. Positioning wire accounted for post procedure. Pt. Response to procedure, tolerated well. Appearance of site: Clean dry and intact without bleeding or edema. All edges of Tegaderm occlusive. Site marked with date and initials of RN placing line. Top 2 side rails in up position, call button in reach, RN notified of all of the above.

## 2023-03-07 NOTE — ED PROVIDER NOTES
ED Attending Attestation Note     Date of evaluation: 3/6/2023    This patient was seen by the resident. I have seen and examined the patient, agree with the workup, evaluation, management and diagnosis. The care plan has been discussed. I have reviewed the ECG and concur with the resident's interpretation. My assessment reveals wake alert female who started with vomiting and abdominal pain on Thursday night into Friday. She has been evaluated with cross-sectional imaging with CT chest abdomen pelvis which was unrevealing. She still remains with pain and nausea. She said it somewhat better. She is awake alert her abdomen is soft with tenderness in the right mid and epigastric region no rebound or peritoneal signs.       Porter Rodriguez MD  03/06/23 0648

## 2023-03-07 NOTE — PROGRESS NOTES
Progress Note    Admit Date: 3/6/2023  Day: 1  Diet: ADULT DIET; Clear Liquid    CC: Abdominal pain    Interval history:   Patient seen and examined at beside this morning. Patient endorsed the story of presentation. A rapid response was called around 6:47AM. Patient was found to have a HR in the 170. Inially thought to be SVT and was given adenosine x2. However EKG was consistent with Afib so patient was given metoprolol 5mg x2. Patient was started on a dilt drip with conversion to NSR later this afternoon. Patient otherwise was otherwise asymptomatic, she denied any chest pain or SOB. She continues to have slight RUQ abdominal pain but states that it is much better since starting antibiotics. HPI:   Abdelrahman Staples is a 65 y.o. female, with PMHx of CVA, DM and obesity , and SMHx cholecystectomy in [de-identified], and hysterectomy presented to the ED with a continued abdominal pain for last 4 days     Patient reported that she started having abdominal pain on Thursday (4 days ago). It was acute in onset, progressively gotten worse, on the right upper quadrant felt like sharp tearing pain, and diffuse abdominal dull pain and rest of the abdomen, associated with an episode of vomiting. Vomitus was none biliary, nonbloody, watery fluid. On Friday and Saturday pain waxed and waned but overall progressively got worse ,on Saturday she went to the [facility] for evaluation. CT scan done and she was admitted, however she was not allowed to drink anything that is when she decided to leave AMA. Next morning she went to [facility] as her pain got even worse, but had a conflict with the nurse that is when she decided to go to the [facility].  And cries she got symptomatic treatment and was sent home on antispasmodic medications.        Around 2 PM today she started having even worse pain that is when she decided to come to the [facility]      Patient believes she had been having flulike symptoms, and had sweats chills 1 day and last 4 days which got resolved by itself. On review of system she reported that she usually has a whole week of constipation and it gets resolved the next week however she denies any fever, diarrhea, urinary symptoms, recent weight gain, or weight loss. She denies any cold or heat intolerance. ROS:  As per HPI     ED Course:  Oriented x 3, NAD  Vitals: BP of  167/84, HR of 107, Temp of 100.4, SpO2 of 95  Physical Exam:   Labs: Na 131, K4.2, Glucose 338, LFTs: , , AST 40, CBC WNL, UA showed ketones and Glucose  Imaging: KUB; no evidence of obstruction, Retained stone the right hemicolon. Air is present in the left hemicolon  Treatment: Zosyn, LR, Dilaudid, Dicyclomine     Summary of Clinical Encounters:  03/06/23: [facility] for ABD PAIN, CT was done which was mainly unremarkable but it did show 2.3 and 1.3 cm right and left adrenal nodules are indeterminate.         Medications:     Scheduled Meds:   sodium chloride flush  5-40 mL IntraVENous 2 times per day    enoxaparin  30 mg SubCUTAneous BID    insulin lispro  0-4 Units SubCUTAneous TID WC    miconazole   Topical BID    insulin lispro  0-4 Units SubCUTAneous Nightly    midazolam        sodium chloride flush  5-40 mL IntraVENous 2 times per day    meropenem  1,000 mg IntraVENous Q8H    docusate sodium  100 mg Oral BID    polyethylene glycol  17 g Oral BID    SMOG Enema  330 mL Rectal Once    ketorolac  15 mg IntraVENous Once    dicyclomine  20 mg IntraMUSCular Once     Continuous Infusions:   sodium chloride 25 mL (03/07/23 0920)    dextrose      sodium chloride      dilTIAZem 2.5 mg/hr (03/07/23 1340)    sodium chloride 75 mL/hr at 03/07/23 0923     PRN Meds:sodium chloride flush, sodium chloride, ondansetron **OR** ondansetron, acetaminophen **OR** acetaminophen, glucose, dextrose bolus **OR** dextrose bolus, glucagon (rDNA), dextrose, sodium chloride flush, sodium chloride    Objective:   Vitals:   T-max:  Patient Vitals for the past 8 hrs:   BP Temp Temp src Pulse Resp SpO2 Weight   03/07/23 1340 -- -- -- 88 -- -- --   03/07/23 1329 109/68 -- -- (!) 111 -- -- --   03/07/23 1213 106/68 -- -- (!) 128 -- -- --   03/07/23 1118 126/81 -- -- -- -- -- --   03/07/23 1108 121/69 -- -- -- -- -- --   03/07/23 1101 119/66 99.1 °F (37.3 °C) Oral (!) 141 18 98 % --   03/07/23 1030 (!) 131/99 -- -- (!) 159 -- -- --   03/07/23 0952 (!) 139/97 -- -- (!) 157 -- -- --   03/07/23 0947 (!) 146/127 -- -- (!) 169 -- -- --   03/07/23 0942 (!) 130/105 -- -- (!) 161 -- -- --   03/07/23 0937 115/89 -- -- (!) 155 -- -- --   03/07/23 0932 (!) 144/99 -- -- (!) 160 -- -- --   03/07/23 0853 (!) 141/75 (!) 100.7 °F (38.2 °C) Oral (!) 166 20 100 % --   03/07/23 0627 -- -- -- (!) 180 -- -- --   03/07/23 0600 -- -- -- -- -- -- 256 lb 9.9 oz (116.4 kg)       Intake/Output Summary (Last 24 hours) at 3/7/2023 1353  Last data filed at 3/7/2023 0148  Gross per 24 hour   Intake 0 ml   Output --   Net 0 ml       Review of Systems  Per history    Physical Exam  Constitutional:       General: She is not in acute distress. HENT:      Mouth/Throat:      Pharynx: Oropharynx is clear. Eyes:      Conjunctiva/sclera: Conjunctivae normal.      Pupils: Pupils are equal, round, and reactive to light. Cardiovascular:      Rate and Rhythm: Tachycardia present. Heart sounds: No murmur heard. Pulmonary:      Effort: Pulmonary effort is normal. No respiratory distress. Breath sounds: Normal breath sounds. Abdominal:      General: Abdomen is flat. Bowel sounds are normal.      Palpations: Abdomen is soft. Tenderness: There is abdominal tenderness (Slight RUQ tenderness). Skin:     Findings: No rash. Neurological:      Mental Status: She is alert and oriented to person, place, and time.       Comments: Chronic right upper and lower extremity weakness from prior stroke   Psychiatric:         Mood and Affect: Mood normal.         Behavior: Behavior normal.       LABS:    CBC:   Recent Labs     03/06/23 1900 03/07/23  0549   WBC 8.6 9.7   HGB 14.1 12.5   HCT 42.2 36.8    160   MCV 84.0 83.9     Renal:    Recent Labs     03/06/23 1900 03/07/23  0045 03/07/23  0549   * 133* 133*   K 4.2 4.5 4.1   CL 95* 96* 99   CO2 23 25 22   BUN 14 11 10   CREATININE 0.7 0.7 0.6   GLUCOSE 338* 287* 261*   CALCIUM 10.1 9.3 8.7   MG  --   --  1.70*   PHOS  --   --  2.8   ANIONGAP 13 12 12     Hepatic:   Recent Labs     03/06/23 1900 03/07/23  0549   AST 40* 87*   ALT 86* 109*   BILITOT 0.7 0.6   BILIDIR <0.2 <0.2   PROT 7.3 6.0*   LABALBU 3.7 3.1*   ALKPHOS 146* 194*     Troponin: No results for input(s): TROPONINI in the last 72 hours. BNP: No results for input(s): BNP in the last 72 hours. Lipids: No results for input(s): CHOL, HDL in the last 72 hours. Invalid input(s): LDLCALCU, TRIGLYCERIDE  ABGs:  No results for input(s): PHART, MFD5BPV, PO2ART, PQJ9RFS, BEART, THGBART, N2DWJFIE, UTN7AGD in the last 72 hours. INR: No results for input(s): INR in the last 72 hours. Lactate: No results for input(s): LACTATE in the last 72 hours. Cultures:  -----------------------------------------------------------------  RAD:   XR CHEST PORTABLE   Final Result      1. Diffuse interstitial opacities favored to reflect interstitial edema. 2.  Indeterminate nodular opacity projecting over the right hilum. Recommend correlation with recent outside chest CT. XR ABDOMEN (KUB) (SINGLE AP VIEW)   Final Result   Addendum (preliminary) 1 of 1   [** ADDENDUM #1 **   Addendum: By Dr. Bill Rodrigez of typographical error      Within the body the report, the statement should read the following. Retained stool within the right hemicolon. Air is present in the left    hemicolon. No evidence of fecal impaction         Final   1.  No evidence of obstruction                Assessment/Plan:   Don Silva is a 65 y.o. female, with PMHx of CVA, DM and obesity , and SHx cholecystectomy in [de-identified], and hysterectomy presented to the ED with a continued abdominal pain for last 4 days     Acute abdominal pain, concern for diverticulitis vs intra-abdominal infection   Patient has a history of chronic constipation presented with continued abdominal pain for last 4 days, tried multiple antispasmodic and painkillers, pain was progressively getting worse. CT abdomen on 3/5 prior to presentation with no acute process. Has been intermittently febrile to Tmax of 100.7. On presentation Alk 146, ALT 86, AST 40. Initial KUB with retained stool within the right hemicolon and air present in the left hemicolon. -PRN Zofran  -NS 75ml/hr  -Advance to CLD  -Merrem  -Blood cultures pending, viral hepatitis pending  -Surgery consulted, enema this morning  -GI consulted     Afib with RVR  Patient has a history of Afib was recommended by cardiology to be on Eliquis and metoprolol however patient stopped taking as she believes they are causing her to have side effects. Went into Afib with RVR with HR in the 170-190s on morning of 3/7  -Dilt drip 10mg/hr  -Echo  -Cardiology consulted    Hyperglycemia  Likely secondary to uncontrolled diabetes  Most recent HbA1c is 8.7 12/6/2022. Patient does not report taking any medication for diabetes.  POCT glucose 338 in the ED  -Hypoglycemia protocol  -Low-dose sliding scale insulin     DVT PPx: Lovenox  Diet: CLD  Code status:  Full Code   Dispo: Pramod Price MD, PGY-1  03/07/23  1:53 PM    This patient has been staffed and discussed with Loc Rosas MD.

## 2023-03-07 NOTE — PROGRESS NOTES
Surgery   Daily Progress Note  Patient: Lamar Martinez    CC: Abdominal Pain    SUBJECTIVE:  Patient states she has more generalized pain this morning, passing gas. ROS: A 14 point review of systems was conducted, significant findings as noted above. All other systems negative. OBJECTIVE:   Infusions:   sodium chloride      dextrose      sodium chloride 100 mL/hr at 03/07/23 0434      I/O:No intake/output data recorded. Wt Readings from Last 1 Encounters:   03/06/23 247 lb 9.6 oz (112.3 kg)       Exam:  /68   Pulse (!) 109   Temp 100.2 °F (37.9 °C) (Oral)   Resp 18   Wt 247 lb 9.6 oz (112.3 kg)   SpO2 93%   BMI 42.50 kg/m²     General Appearance: in no apparent distress   Neuro: A&Ox3, no focal deficits  Lungs: Normal effort; no adventitious breath sounds  Heart: Regular rate and rhythm  Abdomen: Obese, moderate tenderness in the right flank area, no rebound, voluntary guarding, no rigidity  Extremities: No edema, no cyanosis            LABS:   Recent Labs     03/06/23  1900   WBC 8.6   HGB 14.1   HCT 42.2   MCV 84.0           Recent Labs     03/06/23  1900 03/07/23  0045   * 133*   K 4.2 4.5   CL 95* 96*   CO2 23 25   BUN 14 11   CREATININE 0.7 0.7        Recent Labs     03/06/23  1900   AST 40*   ALT 86*   BILIDIR <0.2   BILITOT 0.7   ALKPHOS 146*        Recent Labs     03/06/23  1900   LIPASE 14.0        Recent Labs     03/06/23  1900   PROT 7.3      No results for input(s): CKTOTAL, CKMB, CKMBINDEX, TROPONINI in the last 72 hours. ASSESSMENT/PLAN:   This is a 65 y.o. female with Hx of DM, CVA, obesity and prior cholecystectomy presenting with right flank/abdominal pain. Patient with extensive work up across [address] [place] [address] in past few days.     - Give enema this morning   - Will review outside facility CT scans  - F/u GI recommendations   - Continue NPO, IVF resuscitation, and anitemetics   - Will continue to follow.      Ethel Bloom, DO   PGY1, General Surgery  03/07/23  6:03 AM  Contact via [employer]

## 2023-03-07 NOTE — PROGRESS NOTES
Pt admitted from ED to [id number]. Pt alert and oriented. Pt connected to purwick for increased weakness. Pt tele applied. Pt denies pain or nausea at this time. Pt NPO per orders, swabs provided for comfort. Pt has call light within reach, bed in lowest position with wheels locked, 2/4 side rails up, and bed alarm on.

## 2023-03-07 NOTE — PLAN OF CARE
Problem: Pain  Goal: Verbalizes/displays adequate comfort level or baseline comfort level  Outcome: Progressing  Flowsheets (Taken 3/7/2023 1630)  Verbalizes/displays adequate comfort level or baseline comfort level:   Encourage patient to monitor pain and request assistance   Assess pain using appropriate pain scale   Administer analgesics based on type and severity of pain and evaluate response  Note: Came in with abd pain. One time order of toradol ordered but patients pain resolved without medication.      Problem: Safety - Adult  Goal: Free from fall injury  Outcome: Progressing  Flowsheets (Taken 3/7/2023 1630)  Free From Fall Injury: Instruct family/caregiver on patient safety  Note: High fall risk. Precautions in place.      Problem: Cardiovascular - Adult  Goal: Maintains optimal cardiac output and hemodynamic stability  Outcome: Progressing  Flowsheets (Taken 3/7/2023 1630)  Maintains optimal cardiac output and hemodynamic stability:   Monitor blood pressure and heart rate   Administer fluid and/or volume expanders as ordered  Note: Patient went into afib rvr today. Rapid called. Placed on dilt gtt and fluids. HR flipped back to NSR. Dilt gtt discontinued. Echo completed today.

## 2023-03-07 NOTE — SIGNIFICANT EVENT
A rapid response called for Ms. Najma Cassidy at around 6:47 AM on 3/7/2023. Rapid was called because the patient was noted to have a heart rate in the 190s to 200s. When the ICU team arrived to the patient's room, patient was hemodynamically stable. Blood pressure was noted to be stable. Heart rate noted to be up into the 190s to 200s. Patient denied any symptoms of chest pain, dizziness, lightheadedness, headache, shortness of breath, or any other associated symptoms. The patient was given 6 mg adenosine with a flush. This did not help the patient's heart rate at all. This was followed by 12 mg adenosine. This also did not help. Patient then received metoprolol 5 IV x3. At this time the patient was noted to have an infiltrated IV. A new IV was placed. After this, patient received Versed in preparation for cardioversion. Patient was then safely cardioverted. Heart rate noted to be down to the ***    This note was likely generated using a voice recognition software. Every attempt was made to ensure accuracy, however, unintentional computerized errors may be present.     Magy Ibrahim MD  PGY1, Internal Medicine  03/07/23  7:18 AM

## 2023-03-07 NOTE — PROGRESS NOTES
Pharmacy Note - Extended Infusion Beta-Lactam Adjustment    Piperacillin/Tazobactam ordered for treatment of Intra-abdominal Infection. Per [facility] Extended Infusion Beta-Lactam Policy, Zosyn will be changed to 4.5g q8hr EI. Estimated Creatinine Clearance: Estimated Creatinine Clearance: 104 mL/min (based on SCr of 0.7 mg/dL). Dialysis Status, LISY, CKD: None  BMI: Body mass index is 42.5 kg/m². Rationale for Adjustment: Agent is renally eliminated and demonstrates time-dependent effect on bacterial eradication. Extended-infusion dosing strategy aims to enhance microbiologic and clinical efficacy. Patient with BMI over 40 kg/m2. Pharmacy will continue to monitor renal function, cultures and sensitivities (where available) and adjust dose as necessary. Please call with any questions.     Aniyah Coughlin, PharmD  Main Pharmacy: [id number]  3/7/2023 3:21 AM

## 2023-03-07 NOTE — CONSULTS
[facility]   Cardiac Electrophysiology Consultation   Date: 3/7/2023  Admit Date:  3/6/2023  Reason for Consultation: Atrial fibrillation with rapid ventricular response  Consult Requesting Physician: Jayne Gil MD     Chief Complaint   Patient presents with    Abdominal Pain     Pt reports pain that started last Thursday was seen at 2 EDs for the abdominal pain. Pt reports taking dicyclomine that has helped her pain. Was also discharged home with zofran that hasnt helped her pain. Pt reports pain that is unrelieved with the medication that she has been discharged with. HPI: Moriah Shelby is a 65 y.o. female with a past medical history significant for type 2 diabetes mellitus, CVA, obesity, BMI 40, depression was admitted to the hospital secondary to history of abdominal pain for the past 4 days. Early in the morning, she went into atrial fibrillation with rapid ventricular response with a heart rate of 190 bpm.  Hence, initially she was tried on IV adenosine and later on with IV metoprolol. She was also initiated on Cardizem infusion and EP was consulted for further evaluation management. Of note, patient is followed by EP in 2015 for history of stroke and implantable loop recorder. At that time, she had normal LV ejection fraction. Since then, there is no follow-up with cardiology.     Impression:  Atrial fibrillation with rapid ventricular rate  CVA  Abdominal pain, acute, work-up in progress    Plan:  Changed her Cardizem infusion to 10 mg/hour  Digitalized her  She does have high GCA2VD2-QOBw score (history of CVA, diabetes mellitus, female gender) and hence, recommend oral anticoagulation for stroke prophylaxis, whenever feasible (if no plans for any procedure)  Transthoracic echocardiogram  We will follow      Past Medical History:   Diagnosis Date    Arthritis     Asthma     exercise/cold induced    Blindness, legal     CVA (cerebral vascular accident) ([facility] 05/01/2015    Depression Diabetes mellitus ([facility]     ION (ischemic optic neuropathy)     developed post-op r/t hypotension    Osteomyelitis ([facility] [address]     Left foot; finished Vanco. end of . TIA (transient ischemic attack)         Past Surgical History:   Procedure Laterality Date    BACK SURGERY       SECTION      CHOLECYSTECTOMY      ELBOW SURGERY Left     FOOT SURGERY Left 2014    INCISION AND DRAINAGE MID FOOT LEFT, ENDOSCOPIC GASTROC    FOOT SURGERY Left 2014    LEFT FOOT EXTERNAL FIXATOR REMOVAL        HYSTERECTOMY (CERVIX STATUS UNKNOWN)      ORTHOPEDIC SURGERY      OTHER SURGICAL HISTORY Left 2014    INCISION AND DRAINAGE LEFT FOOT    SOCORRO AND BSO (CERVIX REMOVED)         Allergies   Allergen Reactions    Theophyllines Hives    Apixaban      Foggy and fatigued    Metformin Other (See Comments)     Gets angry and mcgrath    Morphine Nausea And Vomiting       Social History:  Reviewed. reports that she has never smoked. She has never used smokeless tobacco. She reports that she does not drink alcohol and does not use drugs. Family History:  Reviewed. family history includes Diabetes in her paternal grandmother; Stroke in her maternal grandfather and mother. No premature CAD. Review of System:  All other systems reviewed except for that noted above. Pertinent negatives and positives are:     Objective      General: negative for fever, chills   Ophthalmic ROS: negative for - eye pain or loss of vision  ENT ROS: negative for - headaches, sore throat   Respiratory: negative for - cough, sputum  Cardiovascular: Reviewed in HPI  Gastrointestinal: negative for - abdominal pain, diarrhea, N/V  Hematology: negative for - bleeding, blood clots, bruising or jaundice  Genito-Urinary:  negative for - Dysuria or incontinence  Musculoskeletal: negative for - Joint swelling, muscle pain  Neurological: negative for - confusion, dizziness, headaches   Psychiatric: No anxiety, no depression.   Dermatological: negative for - rash    Physical Examination:  Vitals:    23 1340   BP:    Pulse: 88   Resp:    Temp:    SpO2:         Intake/Output Summary (Last 24 hours) at 3/7/2023 1414  Last data filed at 3/7/2023 1030  Gross per 24 hour   Intake 0 ml   Output --   Net 0 ml     No intake/output data recorded. Wt Readings from Last 3 Encounters:   23 256 lb 9.9 oz (116.4 kg)   23 245 lb 6 oz (111.3 kg)   22 246 lb 14.6 oz (112 kg)     Temp  Av.1 °F (37.8 °C)  Min: 99.1 °F (37.3 °C)  Max: 100.7 °F (38.2 °C)  Pulse  Av.7  Min: 88  Max: 180  BP  Min: 105/68  Max: 167/76  SpO2  Av.5 %  Min: 93 %  Max: 100 %    Telemetry: A-fib with RVR  Constitutional: Alert. Oriented to person, place, and time. No distress. Head: Normocephalic and atraumatic. Mouth/Throat: Lips appear moist. Oropharynx is clear and moist.  Eyes: Conjunctivae normal. EOM are normal.   Neck: Neck supple. No lymphadenopathy. No rigidity. No JVD present. Cardiovascular: tachycardia, irregular rhythm. Normal S1&S2. Carotid pulse 2+ bilaterally. Pulmonary/Chest: Bilateral respiratory sounds present. No respiratory accessory muscle use. No wheezes, No rhonchi. Abdominal: Soft. Normal bowel sounds present. No distension, No tenderness. No splenomegaly. No hernia. Musculoskeletal: No tenderness. No edema    Lymphadenopathy: Has no cervical adenopathy. Neurological: Alert and oriented. Cranial nerve II-XII grossly intact, No gross deficit to touch. Skin: Skin is warm and dry. No rash, lesions, ulcerations noted. Psychiatric: No anxiety nor agitation. Labs:  Reviewed.    Recent Labs     23  0045 23  0549   * 133* 133*   K 4.2 4.5 4.1   CL 95* 96* 99   CO2 23 25 22   PHOS  --   --  2.8   BUN 14 11 10   CREATININE 0.7 0.7 0.6     Recent Labs     23  1900 23  0549   WBC 8.6 9.7   HGB 14.1 12.5   HCT 42.2 36.8   MCV 84.0 83.9    160     No results found for: CKTOTAL, CKMB, CKMBINDEX, TROPONINI  No results found for: BNP  Lab Results   Component Value Date/Time    PROTIME 10.0 05/23/2015 02:47 PM    PROTIME 11.7 04/17/2014 10:25 PM    INR 0.93 05/23/2015 02:47 PM    INR 1.09 04/17/2014 10:25 PM     Lab Results   Component Value Date/Time    CHOL 209 05/25/2015 06:21 AM    HDL 40 05/25/2015 06:21 AM    TRIG 144 05/25/2015 06:21 AM       Diagnostic and imaging results reviewed. I independently reviewed the ECG and telemetry.      Scheduled Meds:   sodium chloride flush  5-40 mL IntraVENous 2 times per day    enoxaparin  30 mg SubCUTAneous BID    insulin lispro  0-4 Units SubCUTAneous TID WC    miconazole   Topical BID    insulin lispro  0-4 Units SubCUTAneous Nightly    midazolam        sodium chloride flush  5-40 mL IntraVENous 2 times per day    meropenem  1,000 mg IntraVENous Q8H    docusate sodium  100 mg Oral BID    polyethylene glycol  17 g Oral BID    SMOG Enema  330 mL Rectal Once    ketorolac  15 mg IntraVENous Once    dicyclomine  20 mg IntraMUSCular Once     Continuous Infusions:   sodium chloride 25 mL (03/07/23 0920)    dextrose      sodium chloride      sodium chloride 75 mL/hr at 03/07/23 0923    [id number]       PRN Meds:.sodium chloride flush, sodium chloride, ondansetron **OR** ondansetron, acetaminophen **OR** acetaminophen, glucose, dextrose bolus **OR** dextrose bolus, glucagon (rDNA), dextrose, sodium chloride flush, sodium chloride     Assessment:   Patient Active Problem List    Diagnosis Date Noted    Diabetic infection of right foot ([facility] [address] 03/31/2015    Abdominal pain 03/06/2023    Diabetic ulcer of toe of right foot associated with type 2 diabetes mellitus, with fat layer exposed ([facility] [address] 12/08/2022    Encounter for loop recorder check 06/25/2015    Palpitations 06/25/2015    Ataxia     Cerebral infarction ([facility] [address] 05/24/2015    Depression 03/31/2015    Asthma 03/31/2015    Diabetes mellitus type 2 in obese ([address] 03/31/2015    Toe ulcer ([facility] [address] 03/31/2015 Cellulitis of foot 03/31/2015    Foot abscess 04/18/2014    Charcot foot due to diabetes mellitus ([facility] 04/17/2014    Diabetic foot infection ([facility] 04/17/2014    Cellulitis, leg 03/26/2014    Foot ulcer ([facility] 03/26/2014    Group B streptococcal infection 03/26/2014    Enterococcus faecalis infection 03/26/2014      Active Hospital Problems    Diagnosis Date Noted    Abdominal pain [R10.9] 03/06/2023     Priority: Medium       Thank you for allowing me to participate in the care of [facility] . If you have any questions/comments, please do not hesitate to contact us. Chucky Gresham MD   Cardiac Electrophysiology  [address]    For any EP related issues after 5 PM, contact [address] on call cardiology through .

## 2023-03-08 ENCOUNTER — APPOINTMENT (OUTPATIENT)
Dept: CT IMAGING | Age: 63
End: 2023-03-08
Payer: COMMERCIAL

## 2023-03-08 LAB
ALBUMIN SERPL-MCNC: 2.8 G/DL (ref 3.4–5)
ALBUMIN SERPL-MCNC: 2.9 G/DL (ref 3.4–5)
ALP BLD-CCNC: 150 U/L (ref 40–129)
ALT SERPL-CCNC: 72 U/L (ref 10–40)
ANION GAP SERPL CALCULATED.3IONS-SCNC: 8 MMOL/L (ref 3–16)
AST SERPL-CCNC: 29 U/L (ref 15–37)
BASOPHILS ABSOLUTE: 0 K/UL (ref 0–0.2)
BASOPHILS RELATIVE PERCENT: 0.4 %
BILIRUB SERPL-MCNC: 0.4 MG/DL (ref 0–1)
BILIRUBIN DIRECT: <0.2 MG/DL (ref 0–0.3)
BILIRUBIN URINE: ABNORMAL
BILIRUBIN, INDIRECT: ABNORMAL MG/DL (ref 0–1)
BLOOD, URINE: NEGATIVE
BUN BLDV-MCNC: 14 MG/DL (ref 7–20)
CALCIUM SERPL-MCNC: 8.1 MG/DL (ref 8.3–10.6)
CHLORIDE BLD-SCNC: 105 MMOL/L (ref 99–110)
CHOLESTEROL, TOTAL: 148 MG/DL (ref 0–199)
CLARITY: CLEAR
CO2: 25 MMOL/L (ref 21–32)
COLOR: YELLOW
CREAT SERPL-MCNC: 0.6 MG/DL (ref 0.6–1.2)
EKG ATRIAL RATE: 86 BPM
EKG DIAGNOSIS: NORMAL
EKG P AXIS: 10 DEGREES
EKG P-R INTERVAL: 136 MS
EKG Q-T INTERVAL: 378 MS
EKG QRS DURATION: 90 MS
EKG QTC CALCULATION (BAZETT): 452 MS
EKG R AXIS: -26 DEGREES
EKG T AXIS: 74 DEGREES
EKG VENTRICULAR RATE: 86 BPM
EOSINOPHILS ABSOLUTE: 0.1 K/UL (ref 0–0.6)
EOSINOPHILS RELATIVE PERCENT: 1.2 %
GFR SERPL CREATININE-BSD FRML MDRD: >60 ML/MIN/{1.73_M2}
GLUCOSE BLD-MCNC: 178 MG/DL (ref 70–99)
GLUCOSE BLD-MCNC: 207 MG/DL (ref 70–99)
GLUCOSE BLD-MCNC: 207 MG/DL (ref 70–99)
GLUCOSE BLD-MCNC: 210 MG/DL (ref 70–99)
GLUCOSE BLD-MCNC: 215 MG/DL (ref 70–99)
GLUCOSE URINE: 250 MG/DL
HCT VFR BLD CALC: 32.3 % (ref 36–48)
HDLC SERPL-MCNC: 29 MG/DL (ref 40–60)
HEMOGLOBIN: 11.1 G/DL (ref 12–16)
KETONES, URINE: 40 MG/DL
LDL CHOLESTEROL CALCULATED: 93 MG/DL
LEUKOCYTE ESTERASE, URINE: NEGATIVE
LIPASE: 11 U/L (ref 13–60)
LYMPHOCYTES ABSOLUTE: 1.3 K/UL (ref 1–5.1)
LYMPHOCYTES RELATIVE PERCENT: 16.5 %
MAGNESIUM: 1.9 MG/DL (ref 1.8–2.4)
MCH RBC QN AUTO: 28.8 PG (ref 26–34)
MCHC RBC AUTO-ENTMCNC: 34.3 G/DL (ref 31–36)
MCV RBC AUTO: 83.8 FL (ref 80–100)
MICROSCOPIC EXAMINATION: YES
MONOCYTES ABSOLUTE: 0.7 K/UL (ref 0–1.3)
MONOCYTES RELATIVE PERCENT: 9 %
NEUTROPHILS ABSOLUTE: 5.9 K/UL (ref 1.7–7.7)
NEUTROPHILS RELATIVE PERCENT: 72.9 %
NITRITE, URINE: NEGATIVE
PDW BLD-RTO: 13.9 % (ref 12.4–15.4)
PERFORMED ON: ABNORMAL
PH UA: 6 (ref 5–8)
PHOSPHORUS: 2.3 MG/DL (ref 2.5–4.9)
PLATELET # BLD: 173 K/UL (ref 135–450)
PMV BLD AUTO: 8.5 FL (ref 5–10.5)
POTASSIUM SERPL-SCNC: 3.8 MMOL/L (ref 3.5–5.1)
PROTEIN UA: ABNORMAL MG/DL
RBC # BLD: 3.85 M/UL (ref 4–5.2)
RBC UA: ABNORMAL /HPF (ref 0–4)
REASON FOR REJECTION: NORMAL
REJECTED TEST: NORMAL
SEDIMENTATION RATE, ERYTHROCYTE: 29 MM/HR (ref 0–30)
SODIUM BLD-SCNC: 138 MMOL/L (ref 136–145)
SPECIFIC GRAVITY UA: 1.02 (ref 1–1.03)
TOTAL PROTEIN: 5.5 G/DL (ref 6.4–8.2)
TRIGL SERPL-MCNC: 129 MG/DL (ref 0–150)
URINE TYPE: ABNORMAL
UROBILINOGEN, URINE: 0.2 E.U./DL
VLDLC SERPL CALC-MCNC: 26 MG/DL
WBC # BLD: 8.1 K/UL (ref 4–11)
WBC UA: ABNORMAL /HPF (ref 0–5)

## 2023-03-08 PROCEDURE — 83735 ASSAY OF MAGNESIUM: CPT

## 2023-03-08 PROCEDURE — 6370000000 HC RX 637 (ALT 250 FOR IP): Performed by: INTERNAL MEDICINE

## 2023-03-08 PROCEDURE — 80061 LIPID PANEL: CPT

## 2023-03-08 PROCEDURE — 83690 ASSAY OF LIPASE: CPT

## 2023-03-08 PROCEDURE — 6360000002 HC RX W HCPCS

## 2023-03-08 PROCEDURE — 80076 HEPATIC FUNCTION PANEL: CPT

## 2023-03-08 PROCEDURE — 74174 CTA ABD&PLVS W/CONTRAST: CPT

## 2023-03-08 PROCEDURE — 6360000004 HC RX CONTRAST MEDICATION: Performed by: INTERNAL MEDICINE

## 2023-03-08 PROCEDURE — 99232 SBSQ HOSP IP/OBS MODERATE 35: CPT | Performed by: NURSE PRACTITIONER

## 2023-03-08 PROCEDURE — 86140 C-REACTIVE PROTEIN: CPT

## 2023-03-08 PROCEDURE — 6370000000 HC RX 637 (ALT 250 FOR IP)

## 2023-03-08 PROCEDURE — 6370000000 HC RX 637 (ALT 250 FOR IP): Performed by: NURSE PRACTITIONER

## 2023-03-08 PROCEDURE — 6370000000 HC RX 637 (ALT 250 FOR IP): Performed by: STUDENT IN AN ORGANIZED HEALTH CARE EDUCATION/TRAINING PROGRAM

## 2023-03-08 PROCEDURE — 81001 URINALYSIS AUTO W/SCOPE: CPT

## 2023-03-08 PROCEDURE — 99232 SBSQ HOSP IP/OBS MODERATE 35: CPT | Performed by: SURGERY

## 2023-03-08 PROCEDURE — A4641 RADIOPHARM DX AGENT NOC: HCPCS | Performed by: INTERNAL MEDICINE

## 2023-03-08 PROCEDURE — 80069 RENAL FUNCTION PANEL: CPT

## 2023-03-08 PROCEDURE — 2060000000 HC ICU INTERMEDIATE R&B

## 2023-03-08 PROCEDURE — 85652 RBC SED RATE AUTOMATED: CPT

## 2023-03-08 PROCEDURE — 93010 ELECTROCARDIOGRAM REPORT: CPT | Performed by: INTERNAL MEDICINE

## 2023-03-08 PROCEDURE — 85025 COMPLETE CBC W/AUTO DIFF WBC: CPT

## 2023-03-08 PROCEDURE — 36415 COLL VENOUS BLD VENIPUNCTURE: CPT

## 2023-03-08 PROCEDURE — 2580000003 HC RX 258

## 2023-03-08 RX ORDER — SODIUM CHLORIDE 0.9 % (FLUSH) 0.9 %
5-40 SYRINGE (ML) INJECTION EVERY 12 HOURS SCHEDULED
Status: DISCONTINUED | OUTPATIENT
Start: 2023-03-08 | End: 2023-03-09

## 2023-03-08 RX ORDER — POLYETHYLENE GLYCOL 3350 17 G/17G
68 POWDER, FOR SOLUTION ORAL ONCE
Status: COMPLETED | OUTPATIENT
Start: 2023-03-08 | End: 2023-03-08

## 2023-03-08 RX ORDER — DEXTROSE AND SODIUM CHLORIDE 5; .45 G/100ML; G/100ML
INJECTION, SOLUTION INTRAVENOUS CONTINUOUS
Status: ACTIVE | OUTPATIENT
Start: 2023-03-08 | End: 2023-03-08

## 2023-03-08 RX ORDER — SENNA PLUS 8.6 MG/1
1 TABLET ORAL 2 TIMES DAILY
Status: DISCONTINUED | OUTPATIENT
Start: 2023-03-08 | End: 2023-03-10 | Stop reason: HOSPADM

## 2023-03-08 RX ORDER — SUCRALFATE 1 G/1
1 TABLET ORAL EVERY 8 HOURS SCHEDULED
Status: DISCONTINUED | OUTPATIENT
Start: 2023-03-08 | End: 2023-03-10 | Stop reason: HOSPADM

## 2023-03-08 RX ORDER — LIDOCAINE HYDROCHLORIDE 10 MG/ML
5 INJECTION, SOLUTION EPIDURAL; INFILTRATION; INTRACAUDAL; PERINEURAL ONCE
Status: DISCONTINUED | OUTPATIENT
Start: 2023-03-08 | End: 2023-03-10 | Stop reason: HOSPADM

## 2023-03-08 RX ORDER — PANTOPRAZOLE SODIUM 40 MG/10ML
40 INJECTION, POWDER, LYOPHILIZED, FOR SOLUTION INTRAVENOUS DAILY
Status: DISCONTINUED | OUTPATIENT
Start: 2023-03-08 | End: 2023-03-09

## 2023-03-08 RX ORDER — ACETAMINOPHEN 650 MG/1
650 SUPPOSITORY RECTAL EVERY 6 HOURS PRN
Status: DISCONTINUED | OUTPATIENT
Start: 2023-03-08 | End: 2023-03-10 | Stop reason: HOSPADM

## 2023-03-08 RX ORDER — TRAMADOL HYDROCHLORIDE 50 MG/1
50 TABLET ORAL ONCE
Status: COMPLETED | OUTPATIENT
Start: 2023-03-08 | End: 2023-03-08

## 2023-03-08 RX ORDER — SODIUM CHLORIDE 0.9 % (FLUSH) 0.9 %
5-40 SYRINGE (ML) INJECTION PRN
Status: DISCONTINUED | OUTPATIENT
Start: 2023-03-08 | End: 2023-03-10 | Stop reason: HOSPADM

## 2023-03-08 RX ORDER — LANOLIN ALCOHOL/MO/W.PET/CERES
400 CREAM (GRAM) TOPICAL ONCE
Status: COMPLETED | OUTPATIENT
Start: 2023-03-08 | End: 2023-03-08

## 2023-03-08 RX ORDER — ACETAMINOPHEN 325 MG/1
650 TABLET ORAL EVERY 6 HOURS PRN
Status: DISCONTINUED | OUTPATIENT
Start: 2023-03-08 | End: 2023-03-10 | Stop reason: HOSPADM

## 2023-03-08 RX ORDER — SODIUM CHLORIDE 9 MG/ML
25 INJECTION, SOLUTION INTRAVENOUS PRN
Status: DISCONTINUED | OUTPATIENT
Start: 2023-03-08 | End: 2023-03-09

## 2023-03-08 RX ADMIN — DOCUSATE SODIUM 100 MG: 100 CAPSULE, LIQUID FILLED ORAL at 09:09

## 2023-03-08 RX ADMIN — TRAMADOL HYDROCHLORIDE 50 MG: 50 TABLET ORAL at 07:22

## 2023-03-08 RX ADMIN — ENOXAPARIN SODIUM 30 MG: 100 INJECTION SUBCUTANEOUS at 20:52

## 2023-03-08 RX ADMIN — METOPROLOL TARTRATE 25 MG: 25 TABLET, FILM COATED ORAL at 12:07

## 2023-03-08 RX ADMIN — POLYETHYLENE GLYCOL 3350 17 G: 17 POWDER, FOR SOLUTION ORAL at 20:53

## 2023-03-08 RX ADMIN — POLYETHYLENE GLYCOL 3350 17 G: 17 POWDER, FOR SOLUTION ORAL at 09:09

## 2023-03-08 RX ADMIN — POTASSIUM BICARBONATE 40 MEQ: 782 TABLET, EFFERVESCENT ORAL at 15:35

## 2023-03-08 RX ADMIN — INSULIN LISPRO 1 UNITS: 100 INJECTION, SOLUTION INTRAVENOUS; SUBCUTANEOUS at 14:03

## 2023-03-08 RX ADMIN — ENOXAPARIN SODIUM 30 MG: 100 INJECTION SUBCUTANEOUS at 09:09

## 2023-03-08 RX ADMIN — HYDROMORPHONE HYDROCHLORIDE 0.25 MG: 1 INJECTION, SOLUTION INTRAMUSCULAR; INTRAVENOUS; SUBCUTANEOUS at 18:07

## 2023-03-08 RX ADMIN — SENNOSIDES 8.6 MG: 8.6 TABLET, FILM COATED ORAL at 09:25

## 2023-03-08 RX ADMIN — MEROPENEM 1000 MG: 1 INJECTION INTRAVENOUS at 09:17

## 2023-03-08 RX ADMIN — MEROPENEM 1000 MG: 1 INJECTION INTRAVENOUS at 00:42

## 2023-03-08 RX ADMIN — DOCUSATE SODIUM 100 MG: 100 CAPSULE, LIQUID FILLED ORAL at 20:53

## 2023-03-08 RX ADMIN — MICONAZOLE NITRATE: 20 POWDER TOPICAL at 20:53

## 2023-03-08 RX ADMIN — IOPAMIDOL 75 ML: 755 INJECTION, SOLUTION INTRAVENOUS at 12:40

## 2023-03-08 RX ADMIN — DEXTROSE AND SODIUM CHLORIDE: 5; 450 INJECTION, SOLUTION INTRAVENOUS at 12:08

## 2023-03-08 RX ADMIN — MICONAZOLE NITRATE: 20 POWDER TOPICAL at 09:21

## 2023-03-08 RX ADMIN — SODIUM CHLORIDE, PRESERVATIVE FREE 10 ML: 5 INJECTION INTRAVENOUS at 20:54

## 2023-03-08 RX ADMIN — SENNOSIDES 8.6 MG: 8.6 TABLET, FILM COATED ORAL at 20:53

## 2023-03-08 RX ADMIN — POLYETHYLENE GLYCOL 3350 68 G: 17 POWDER, FOR SOLUTION ORAL at 17:31

## 2023-03-08 RX ADMIN — SODIUM CHLORIDE, PRESERVATIVE FREE 10 ML: 5 INJECTION INTRAVENOUS at 20:52

## 2023-03-08 RX ADMIN — MEROPENEM 1000 MG: 1 INJECTION INTRAVENOUS at 17:31

## 2023-03-08 RX ADMIN — ACETAMINOPHEN 650 MG: 325 TABLET ORAL at 00:53

## 2023-03-08 RX ADMIN — Medication 400 MG: at 15:35

## 2023-03-08 RX ADMIN — BARIUM SULFATE 900 ML: 21 SUSPENSION ORAL at 12:40

## 2023-03-08 RX ADMIN — SUCRALFATE 1 G: 1 TABLET ORAL at 20:53

## 2023-03-08 RX ADMIN — INSULIN LISPRO 1 UNITS: 100 INJECTION, SOLUTION INTRAVENOUS; SUBCUTANEOUS at 18:10

## 2023-03-08 ASSESSMENT — PAIN DESCRIPTION - ORIENTATION
ORIENTATION: RIGHT;UPPER
ORIENTATION: MID;RIGHT
ORIENTATION: RIGHT;MID
ORIENTATION: RIGHT;UPPER

## 2023-03-08 ASSESSMENT — PAIN DESCRIPTION - DESCRIPTORS
DESCRIPTORS: ACHING;SHARP
DESCRIPTORS: ACHING;SHARP
DESCRIPTORS: ACHING;PRESSURE;SHARP
DESCRIPTORS: ACHING;PRESSURE;SHARP

## 2023-03-08 ASSESSMENT — PAIN - FUNCTIONAL ASSESSMENT
PAIN_FUNCTIONAL_ASSESSMENT: PREVENTS OR INTERFERES SOME ACTIVE ACTIVITIES AND ADLS

## 2023-03-08 ASSESSMENT — PAIN DESCRIPTION - FREQUENCY
FREQUENCY: CONTINUOUS
FREQUENCY: CONTINUOUS

## 2023-03-08 ASSESSMENT — PAIN DESCRIPTION - ONSET
ONSET: ON-GOING
ONSET: ON-GOING

## 2023-03-08 ASSESSMENT — PAIN DESCRIPTION - LOCATION
LOCATION: ABDOMEN

## 2023-03-08 ASSESSMENT — PAIN SCALES - GENERAL
PAINLEVEL_OUTOF10: 6
PAINLEVEL_OUTOF10: 5
PAINLEVEL_OUTOF10: 5
PAINLEVEL_OUTOF10: 6
PAINLEVEL_OUTOF10: 8

## 2023-03-08 ASSESSMENT — PAIN DESCRIPTION - PAIN TYPE
TYPE: ACUTE PAIN

## 2023-03-08 NOTE — PLAN OF CARE
Problem: Discharge Planning  Goal: Discharge to home or other facility with appropriate resources  Outcome: Progressing  Flowsheets (Taken 3/8/2023 0147)  Discharge to home or other facility with appropriate resources:   Identify barriers to discharge with patient and caregiver   Arrange for needed discharge resources and transportation as appropriate   Identify discharge learning needs (meds, wound care, etc)   Arrange for interpreters to assist at discharge as needed   Refer to discharge planning if patient needs post-hospital services based on physician order or complex needs related to functional status, cognitive ability or social support system     Problem: Pain  Goal: Verbalizes/displays adequate comfort level or baseline comfort level  3/8/2023 0147 by Traci Aragon RN  Outcome: Progressing  Flowsheets (Taken 3/8/2023 0147)  Verbalizes/displays adequate comfort level or baseline comfort level:   Encourage patient to monitor pain and request assistance   Assess pain using appropriate pain scale   Administer analgesics based on type and severity of pain and evaluate response   Implement non-pharmacological measures as appropriate and evaluate response   Consider cultural and social influences on pain and pain management   Notify Licensed Independent Practitioner if interventions unsuccessful or patient reports new pain     Problem: Skin/Tissue Integrity  Goal: Absence of new skin breakdown  Description: 1. Monitor for areas of redness and/or skin breakdown  2. Assess vascular access sites hourly  3. Every 4-6 hours minimum:  Change oxygen saturation probe site  4. Every 4-6 hours:  If on nasal continuous positive airway pressure, respiratory therapy assess nares and determine need for appliance change or resting period.   Outcome: Progressing     Problem: Safety - Adult  Goal: Free from fall injury  3/8/2023 0147 by Traci Aragon RN  Outcome: Progressing  Flowsheets (Taken 3/8/2023 0147)  Free From Fall Injury:   Instruct family/caregiver on patient safety   Based on caregiver fall risk screen, instruct family/caregiver to ask for assistance with transferring infant if caregiver noted to have fall risk factors     Problem: ABCDS Injury Assessment  Goal: Absence of physical injury  Outcome: Progressing  Flowsheets (Taken 3/8/2023 0147)  Absence of Physical Injury: Implement safety measures based on patient assessment     Problem: Cardiovascular - Adult  Goal: Maintains optimal cardiac output and hemodynamic stability  3/8/2023 0147 by Petr Omer RN  Outcome: Progressing  Flowsheets (Taken 3/8/2023 0147)  Maintains optimal cardiac output and hemodynamic stability:   Monitor blood pressure and heart rate   Monitor urine output and notify Licensed Independent Practitioner for values outside of normal range   Assess for signs of decreased cardiac output   Administer fluid and/or volume expanders as ordered   Administer vasoactive medications as ordered     Problem: Cardiovascular - Adult  Goal: Absence of cardiac dysrhythmias or at baseline  Outcome: Progressing  Flowsheets (Taken 3/8/2023 0147)  Absence of cardiac dysrhythmias or at baseline:   Monitor cardiac rate and rhythm   Assess for signs of decreased cardiac output   Administer antiarrhythmia medication and electrolyte replacement as ordered

## 2023-03-08 NOTE — PLAN OF CARE
Problem: Discharge Planning  Goal: Discharge to home or other facility with appropriate resources  3/8/2023 0940 by Anita Swift RN  Outcome: Progressing  Flowsheets (Taken 3/8/2023 0940)  Discharge to home or other facility with appropriate resources:   Identify barriers to discharge with patient and caregiver   Arrange for needed discharge resources and transportation as appropriate   Identify discharge learning needs (meds, wound care, etc)     Problem: Pain  Goal: Verbalizes/displays adequate comfort level or baseline comfort level  3/8/2023 0940 by Anita Swift RN  Outcome: Progressing  Flowsheets (Taken 3/8/2023 0940)  Verbalizes/displays adequate comfort level or baseline comfort level:   Encourage patient to monitor pain and request assistance   Assess pain using appropriate pain scale   Administer analgesics based on type and severity of pain and evaluate response   Implement non-pharmacological measures as appropriate and evaluate response     Problem: Skin/Tissue Integrity  Goal: Absence of new skin breakdown  Description: 1. Monitor for areas of redness and/or skin breakdown  2. Assess vascular access sites hourly  3. Every 4-6 hours minimum:  Change oxygen saturation probe site  4. Every 4-6 hours:  If on nasal continuous positive airway pressure, respiratory therapy assess nares and determine need for appliance change or resting period.   3/8/2023 0940 by Anita Swift RN  Outcome: Progressing     Problem: Safety - Adult  Goal: Free from fall injury  3/8/2023 0940 by Anita Swift RN  Outcome: Progressing  Flowsheets (Taken 3/8/2023 0940)  Free From Fall Injury: Instruct family/caregiver on patient safety     Problem: ABCDS Injury Assessment  Goal: Absence of physical injury  3/8/2023 0940 by Anita Swift RN  Outcome: Progressing  Flowsheets (Taken 3/8/2023 0940)  Absence of Physical Injury: Implement safety measures based on patient assessment     Problem: Cardiovascular - Adult  Goal: Maintains optimal cardiac output and hemodynamic stability  3/8/2023 0940 by Kathy Mcmullen RN  Outcome: Progressing  Flowsheets (Taken 3/8/2023 0940)  Maintains optimal cardiac output and hemodynamic stability:   Monitor blood pressure and heart rate   Monitor urine output and notify Licensed Independent Practitioner for values outside of normal range   Assess for signs of decreased cardiac output   Administer fluid and/or volume expanders as ordered     Problem: Cardiovascular - Adult  Goal: Absence of cardiac dysrhythmias or at baseline  3/8/2023 0147 by Lily Mancera RN  Outcome: Progressing  Flowsheets (Taken 3/8/2023 0147)  Absence of cardiac dysrhythmias or at baseline:   Monitor cardiac rate and rhythm   Assess for signs of decreased cardiac output   Administer antiarrhythmia medication and electrolyte replacement as ordered

## 2023-03-08 NOTE — PROGRESS NOTES
Informed Dr. Jose Ramon Velazquez, via perfect serve, of patient's refusal to take Protonix and Carafate.

## 2023-03-08 NOTE — PROGRESS NOTES
Bariatric Surgery   Daily Progress Note  Patient: Julieth Burns    CC: Abdominal Pain    SUBJECTIVE:  Patient states she is starving, but that her pain is still presents describes it as a belt around her upper abdomen while eating her tuna salad. Passing flatus no BM today. Was given contrast to drink today for CT scan. ROS: A 14 point review of systems was conducted, significant findings as noted above. All other systems negative. OBJECTIVE:   Infusions:   dextrose 5 % and 0.45 % NaCl 75 mL/hr at 03/08/23 1208    sodium chloride 25 mL (03/07/23 0920)    dextrose      sodium chloride        I/O:No intake/output data recorded. Wt Readings from Last 1 Encounters:   03/08/23 258 lb 6.1 oz (117.2 kg)       Exam:  BP (!) 142/90   Pulse 88   Temp 98.6 °F (37 °C) (Oral)   Resp 18   Wt 258 lb 6.1 oz (117.2 kg)   SpO2 94%   BMI 44.35 kg/m²     General Appearance: in no apparent distress   Neuro: A&Ox3, no focal deficits  Lungs: Normal effort; no adventitious breath sounds  Heart: Regular rate and rhythm  Abdomen: Obese, mildly tenderness in the right flank area, distractable, no rebound or guarding,  no rigidity  Extremities: No edema, no cyanosis            LABS:   Recent Labs     03/07/23  0549 03/08/23  0400   WBC 9.7 8.1   HGB 12.5 11.1*   HCT 36.8 32.3*   MCV 83.9 83.8    173        Recent Labs     03/07/23  0549 03/08/23  0400   * 138   K 4.1 3.8   CL 99 105   CO2 22 25   PHOS 2.8 2.3*   BUN 10 14   CREATININE 0.6 0.6        Recent Labs     03/07/23  0549 03/08/23  0400   AST 87* 29   * 72*   BILIDIR <0.2 <0.2   BILITOT 0.6 0.4   ALKPHOS 194* 150*        Recent Labs     03/06/23  1900   LIPASE 14.0        Recent Labs     03/07/23  0549 03/08/23  0400   PROT 6.0* 5.5*      No results for input(s): CKTOTAL, CKMB, CKMBINDEX, TROPONINI in the last 72 hours.     ASSESSMENT/PLAN:   This is a 65 y.o. female with Hx of DM, CVA, obesity and prior cholecystectomy presenting with right flank/abdominal pain. Patient with extensive work up across [facility] in past few days. - continue bowel reg and SMOG enemas PRN  - follow up GI work up for transaminitis, likely 2/2 [place], possible EGD for evaluation of upper abdominal pain? Lipase 2 days ago WNL, LFTS stable  - CTA reviewed, no gross intra-abdominal abnormalities, no mesenteric ischemia, mild SMA stenosis with patency. Right colon with stool, contrast in the small bowel, no gross impaction in rectum.    - continue medical management per primary     Tyrell Harris MD   Gen Surg PGY 5  3/8/2023  2:35 PM  [phone]

## 2023-03-08 NOTE — DISCHARGE SUMMARY
INTERNAL MEDICINE DEPARTMENT AT [address] [place] [address]  DISCHARGE SUMMARY    Patient ID: Elsa Piña                                             Discharge Date: 3/8/2023   Patient's PCP: Amos Bronson                                          Discharge Physician: Kate Virgen MD  Admit Date: 3/6/2023   Admitting Physician: Wes Dunn MD    DISCHARGE DIAGNOSES:  - Gastric Ulcers  - Candidal esophagitis   - Afib with RVR  -Uncontrolled T2DM    Hospital Course:      Elsa Piña is a 65 y.o. female with PMHx of CVA, Afib, and T2DM who presented to the ED with abdominal pain for 4 days. Patient complained of RUQ pain, that was becoming progressively worse and associated with an episode of vomiting. In the ED, patient was stable but febrile with temperature of 100.4. Labs were remarkable for hyperglycemia in the 300s. KUB showed retained stool within the right hemicolon. Patient was admitted for further management. Surgery and GI were consulted. CTA was done on 3/8 which showed 30% stenosis of the SMA but without sequelae of mesenteric ischemia. Patient received enema for constipation. Patient underwent EGD on 3/9 which showed few small 2 to 3 mm ulcers and erosions in the gastric antrum and Grade D candidal esophagitis in the lower two thirds of the esophagus. Patient was started on Protonix and fluconazole with improvement in symptoms. Patient was discharged on a fluconazole 100mg daily to complete a 21-day course as well as Protonix 40mg daily. Recommend outpatient follow-up with GI. Throughout admission, 2 rapid responses were called as patient was found to have a heart rate in the 160s to 190s with EKG consistent with A-fib with RVR. Cardiology was consulted. Echo done on 3/7 with EF of 55-60% and normal diastolic function. Patient was treated with digoxin, propafenone, and a diltiazem drip with conversion to normal sinus rhythm and about 5 hours following each episode.   Patient was also placed on metoprolol and Lovenox with improvement. Patient was discharged on metoprolol 25mg BID, propafenone 225mg BID, and Xarelto. Patient will also require close outpatient follow-up with cardiology. Also discussed the importance of taking medications to get diabetes under better control. Patient was discharged on glipizide 5mg BID and 10U of lantus nightly. On day of discharge, patient denied any headaches, lightheadedness/dizziness, cough, shortness of breath, chest pain, nausea/vomiting, diarrhea or constipation. Patient's status improved. Patient was stable for discharge and agreeable to the plan. Please follow-up as outlined. Patient has been asked to monitor for recurrence of symptoms or new symptoms including but not limited to fevers/chills, nausea/vomiting, chest pain, or shortness of breath, and to seek immediate medical attention or call 911 in the case of occurrence. Physical Exam:  BP (!) 140/66   Pulse 97   Temp 98 °F (36.7 °C) (Axillary)   Resp 16   Wt 258 lb 6.1 oz (117.2 kg)   SpO2 97%   BMI 44.35 kg/m²   General appearance: alert, appears stated age and cooperative  Head: Normocephalic, without obvious abnormality, atraumatic  Eyes: conjunctivae/corneas clear. PERRL, EOM's intact.   Lungs: clear to auscultation bilaterally  Heart: regular rate and rhythm no murmur  Abdomen: soft, bowel sounds normal. Slight right upper quadrant tenderness   Extremities: extremities normal, atraumatic, no cyanosis or edema  Neurologic: Grossly normal    Consults:  IP CONSULT TO HOSPITALIST  IP CONSULT TO GENERAL SURGERY  IP CONSULT TO GI  IP CONSULT TO PHARMACY  IP CONSULT TO CARDIOLOGY    Disposition: home  Discharged Condition: Stable  Follow Up: Primary Care Physician, cardiology, and gastroenterology within one week    DISCHARGE MEDICATION:     Medication List        ASK your doctor about these medications      aspirin 81 MG EC tablet     dicyclomine 20 MG tablet  Commonly known as: BENTYL     loratadine 10 MG tablet  Commonly known as: CLARITIN     magnesium 30 MG tablet     MILK THISTLE PO     ondansetron 4 MG disintegrating tablet  Commonly known as: ZOFRAN-ODT     Potassium 99 MG Tabs     vitamin B complex Tabs     vitamin C 500 MG tablet  Commonly known as: ASCORBIC ACID     vitamin D3 10 MCG (400 UNIT) Tabs tablet  Commonly known as: CHOLECALCIFEROL     zinc gluconate 50 MG tablet            Activity: activity as tolerated  Diet: diabetic diet  Wound Care: none needed    Time Spent on discharge is more than 30 minutes    Signed:  Magi Parr MD,  PGY1   3/8/2023

## 2023-03-08 NOTE — PROGRESS NOTES
[facility] GI  Gastroenterology Progress Note    Benjamin Berumen is a 65 y.o. female patient. Principal Problem:    Abdominal pain  Resolved Problems:    * No resolved hospital problems. *      SUBJECTIVE:    RUQ returned today. No n/v.     Physical    VITALS:  BP (!) 146/88   Pulse 87   Temp 98.2 °F (36.8 °C) (Oral)   Resp 17   Wt 258 lb 6.1 oz (117.2 kg)   SpO2 94%   BMI 44.35 kg/m²   TEMPERATURE:  Current - Temp: 98.2 °F (36.8 °C); Max - Temp  Av.3 °F (36.8 °C)  Min: 98 °F (36.7 °C)  Max: 98.6 °F (37 °C)    NAD, age appropriate  Integ: no rash, jaundice, ecchymoses  Abdomen soft, ND, tender to palpation right upper quadrant, no rebound no HSM, Bowel sounds normal.    Data    Data Review:    Recent Labs     23  19023  0549 23  0400   WBC 8.6 9.7 8.1   HGB 14.1 12.5 11.1*   HCT 42.2 36.8 32.3*   MCV 84.0 83.9 83.8    160 173     Recent Labs     23  0045 23  0549 23  0400   * 133* 138   K 4.5 4.1 3.8   CL 96* 99 105   CO2 25 22 25   PHOS  --  2.8 2.3*   BUN 11 10 14   CREATININE 0.7 0.6 0.6     Recent Labs     23  1900 23  0549 23  0400   AST 40* 87* 29   ALT 86* 109* 72*   BILIDIR <0.2 <0.2 <0.2   BILITOT 0.7 0.6 0.4   ALKPHOS 146* 194* 150*     Recent Labs     23  190   LIPASE 14.0     No results for input(s): PROTIME, INR in the last 72 hours. No results for input(s): PTT in the last 72 hours. Radiology Review:   CTA: 30% SMA stenosis       ASSESSMENT     1. Right upper quadrant abdominal pain. CT scan of the abdomen/pelvis at [facility] unremarkable on 3/5/23. Hx cholecystectomy . Her abdominal pain had improved and returned today. CTA minimal 30% stenosis of the SMA but no other findings to explain her symptoms. The stenosis is quite mild and not thought to be clinically significant. 2.  Vomiting-resolved  3.   Constipation with last bowel movement about 6 days ago-she received MiraLAX, enemas, oral contrast for CT scan without bowel movements. 4.  Mild transaminitis likely secondary to [place]  5. Poorly controlled diabetes with glucose 313 on admission and urinalysis with ketones           PLAN    1.   EGD on 3/9 to evaluate for upper GI source of her right upper quadrant abdominal pain such as peptic ulcer disease  2.  4 doses of MiraLAX in 24 ounces of clear liquid now to try to relieve constipation  3.  N.p.o. after midnight     [address], MD  [address] and [facility]

## 2023-03-08 NOTE — PROGRESS NOTES
Progress Note    Admit Date: 3/6/2023  Day: 3  Diet: Diet NPO    CC: Abdominal pain    Interval history:   Patient seen and examined at bedside. Rapid response was called this morning for HR in the 180s due to Afib with RVR, with BP in the 80s/40s. Patient was given 0.5L IVFs with improvement in BP. Patient was otherwise asymptomatic and denies any chest pain or SOB. Patient states that she continues to have RUQ pain similar to before and states that she has not been able to have a bowel movement yet. HPI:   Wojciech Mello is a 65 y.o. female, with PMHx of CVA, DM and obesity , and SMHx cholecystectomy in [de-identified], and hysterectomy presented to the ED with a continued abdominal pain for last 4 days     Patient reported that she started having abdominal pain on Thursday (4 days ago). It was acute in onset, progressively gotten worse, on the right upper quadrant felt like sharp tearing pain, and diffuse abdominal dull pain and rest of the abdomen, associated with an episode of vomiting. Vomitus was none biliary, nonbloody, watery fluid. On Friday and Saturday pain waxed and waned but overall progressively got worse ,on Saturday she went to the [facility] for evaluation. CT scan done and she was admitted, however she was not allowed to drink anything that is when she decided to leave AMA. Next morning she went to [facility] as her pain got even worse, but had a conflict with the nurse that is when she decided to go to the [facility].  And cries she got symptomatic treatment and was sent home on antispasmodic medications. Around 2 PM today she started having even worse pain that is when she decided to come to the [facility]      Patient believes she had been having flulike symptoms, and had sweats chills 1 day and last 4 days which got resolved by itself.        On review of system she reported that she usually has a whole week of constipation and it gets resolved the next week however she denies any fever, diarrhea, urinary symptoms, recent weight gain, or weight loss. She denies any cold or heat intolerance. ROS:  As per HPI     ED Course:  Oriented x 3, NAD  Vitals: BP of  167/84, HR of 107, Temp of 100.4, SpO2 of 95  Physical Exam:   Labs: Na 131, K4.2, Glucose 338, LFTs: , , AST 40, CBC WNL, UA showed ketones and Glucose  Imaging: KUB; no evidence of obstruction, Retained stone the right hemicolon. Air is present in the left hemicolon  Treatment: Zosyn, LR, Dilaudid, Dicyclomine     Summary of Clinical Encounters:  03/06/23: [facility] and [facility] for ABD PAIN, CT was done which was mainly unremarkable but it did show 2.3 and 1.3 cm right and left adrenal nodules are indeterminate.         Medications:     Scheduled Meds:   senna  1 tablet Oral BID    metoprolol tartrate  25 mg Oral BID    pantoprazole  40 mg IntraVENous Daily    sucralfate  1 g Oral 3 times per day    insulin glargine  10 Units SubCUTAneous Nightly    lidocaine 1 % injection  5 mL IntraDERmal Once    sodium chloride flush  5-40 mL IntraVENous 2 times per day    sodium chloride flush  5-40 mL IntraVENous 2 times per day    enoxaparin  30 mg SubCUTAneous BID    insulin lispro  0-4 Units SubCUTAneous TID WC    miconazole   Topical BID    insulin lispro  0-4 Units SubCUTAneous Nightly    sodium chloride flush  5-40 mL IntraVENous 2 times per day    meropenem  1,000 mg IntraVENous Q8H    docusate sodium  100 mg Oral BID    polyethylene glycol  17 g Oral BID    digoxin  500 mcg IntraVENous Once     Continuous Infusions:   sodium chloride      sodium chloride 25 mL (03/09/23 0110)    dextrose      sodium chloride       PRN Meds:acetaminophen **OR** acetaminophen, sodium chloride flush, sodium chloride, sodium chloride flush, sodium chloride, ondansetron **OR** ondansetron, glucose, dextrose bolus **OR** dextrose bolus, glucagon (rDNA), dextrose, sodium chloride flush, sodium chloride    Objective:   Vitals:   T-max:  Patient Vitals for the past 8 hrs:   BP Temp Temp src Pulse Resp SpO2 Weight   03/09/23 0553 -- -- -- -- -- -- 257 lb 15 oz (117 kg)   03/09/23 0433 (!) 132/93 98 °F (36.7 °C) Axillary 94 16 -- --   03/09/23 0004 (!) 150/88 98.8 °F (37.1 °C) Oral 97 16 94 % --       Intake/Output Summary (Last 24 hours) at 3/9/2023 0717  Last data filed at 3/9/2023 0434  Gross per 24 hour   Intake 1860 ml   Output 2350 ml   Net -490 ml       Review of Systems  Per history    Physical Exam  Constitutional:       General: She is not in acute distress. HENT:      Mouth/Throat:      Pharynx: Oropharynx is clear. Eyes:      Conjunctiva/sclera: Conjunctivae normal.      Pupils: Pupils are equal, round, and reactive to light. Cardiovascular:      Rate and Rhythm: Tachycardia present. Rhythm irregular. Heart sounds: No murmur heard. Pulmonary:      Effort: Pulmonary effort is normal. No respiratory distress. Breath sounds: Normal breath sounds. Comments: On 2L NC  Abdominal:      General: Abdomen is flat. Bowel sounds are normal.      Palpations: Abdomen is soft. Tenderness: There is abdominal tenderness (maximally in RUQ tenderness). Skin:     Findings: No rash. Neurological:      Mental Status: She is alert and oriented to person, place, and time.       Comments: Chronic right upper and lower extremity weakness from prior stroke   Psychiatric:         Mood and Affect: Mood normal.         Behavior: Behavior normal.       LABS:    CBC:   Recent Labs     03/07/23  0549 03/08/23  0400 03/09/23  0546   WBC 9.7 8.1 6.0   HGB 12.5 11.1* 12.7   HCT 36.8 32.3* 40.5    173 219   MCV 83.9 83.8 87.8     Renal:    Recent Labs     03/07/23  0549 03/08/23  0400 03/09/23  0546   * 138 137   K 4.1 3.8 4.3   CL 99 105 102   CO2 22 25 26   BUN 10 14 9   CREATININE 0.6 0.6 0.5*   GLUCOSE 261* 215* 196*   CALCIUM 8.7 8.1* 8.6   MG 1.70* 1.90 1.90   PHOS 2.8 2.3* 2.1* ANIONGAP 12 8 9     Hepatic:   Recent Labs     03/06/23  1900 03/07/23  0549 03/08/23  0400 03/09/23  0546   AST 40* 87* 29  --    ALT 86* 109* 72*  --    BILITOT 0.7 0.6 0.4  --    BILIDIR <0.2 <0.2 <0.2  --    PROT 7.3 6.0* 5.5*  --    LABALBU 3.7 3.1* 2.9*  2.8* 3.0*   ALKPHOS 146* 194* 150*  --      Troponin: No results for input(s): TROPONINI in the last 72 hours. BNP: No results for input(s): BNP in the last 72 hours. Lipids:   Recent Labs     03/08/23  0400   CHOL 148   HDL 29*     ABGs:  No results for input(s): PHART, BDX7CLF, PO2ART, OAF7AGJ, BEART, THGBART, N8CPAVKC, PLZ2BIE in the last 72 hours. INR: No results for input(s): INR in the last 72 hours. Lactate: No results for input(s): LACTATE in the last 72 hours. Cultures:  -----------------------------------------------------------------  RAD:   XR CHEST PORTABLE   Final Result      1. Diffuse interstitial opacities favored to reflect interstitial edema. 2.  Indeterminate nodular opacity projecting over the right hilum. Recommend correlation with recent outside chest CT. XR ABDOMEN (KUB) (SINGLE AP VIEW)   Final Result   Addendum (preliminary) 1 of 1   [** ADDENDUM #1 **   Addendum: By Dr. Allison Luie of typographical error      Within the body the report, the statement should read the following. Retained stool within the right hemicolon. Air is present in the left    hemicolon. No evidence of fecal impaction         Final   1.  No evidence of obstruction                Assessment/Plan:   Benjamin Berumen is a 65 y.o. female, with PMHx of CVA, DM and obesity , and SHx cholecystectomy in [de-identified], and hysterectomy presented to the ED with a continued abdominal pain for last 4 days     Acute abdominal pain possibly 2/2 to gastroparesis and constipation  Patient has a history of chronic constipation presented with continued abdominal pain for last 4 days, tried multiple antispasmodic and painkillers, pain was progressively getting worse. CT abdomen on 3/5 prior to presentation with no acute process. Has been intermittently febrile to Tmax of 100.7. On presentation Alk 146, ALT 86, AST 40. Initial KUB with retained stool within the right hemicolon and air present in the left hemicolon. BC NGTD, sed rate wnl, CRP elevated to 189.4. CTA on 3/8 with no CT sequelae of mesenteric ischemia and with mild stenosis of SMA without occlusion. -PRN Zofran  -Merrem  -Viral hepatitis pending  -Surgery following  -GI consulted, plan for EGD this admission  -Senokot 8.6mg BID, Glycolax 17g BID     Afib with RVR  Patient has a history of Afib was recommended by cardiology to be on Eliquis and metoprolol however patient stopped taking as she believes they are causing her to have side effects. Went into Afib with RVR with HR in the 170-190s on morning of 3/7 and again on 3/9. Echo showed EF of 55-60% with normal diastolic function.  -Cardiology following, discussed with Dr Shanna Lima this morning, will give 250mcg of digoxin, 600mg of propafenone, and restart dilt gtt  -Metoprolol 25mg BID  -Lovenox 120mg BID    Hyperglycemia  Likely secondary to uncontrolled diabetes  Most recent HbA1c is 8.7 12/6/2022. Patient does not report taking any medication for diabetes. POCT glucose 338 in the ED  -Hypoglycemia protocol  -Low-dose sliding scale insulin  -10U lantus nightly     DVT PPx: Lovenox  Diet: Regular adult diet  Code status:  Full Code   Dispo: Mili Keller MD, PGY-1  03/09/23  7:17 AM    Patient seen and examined, labs and imaging studies reviewed, agree with assessment and plan as outlined above. Continue with current care and plan. Discussed case with patients nurse, discussed case with care team, discussed plan. Endoscopy has been planned for today, continue with monitoring d/w  and patient at length. Check ray.      MD Stuart Yen

## 2023-03-08 NOTE — PROGRESS NOTES
CC: AF   HPI:  Vivienne Castro is a 65 y.o. female with a past medical history significant for type 2 diabetes mellitus, CVA, obesity, BMI 40, depression was admitted to the hospital secondary to history of abdominal pain for the past 4 days. Early in the morning, she went into atrial fibrillation with rapid ventricular response with a heart rate of 190 bpm.  Hence, initially she was tried on IV adenosine and later on with IV metoprolol. She was also initiated on Cardizem infusion and EP was consulted for further evaluation management. Of note, patient is followed by EP in 2015 for history of stroke and implantable loop recorder. At that time, she had normal LV ejection fraction. Since then, there is no follow-up with cardiology  ----------------------------------------------------------------------------------------  S: Denies cp, sob or palpitations. C/o abdominal pain     Tele: Sinus     O:  Physical Exam:  BP (!) 141/86   Pulse 95   Temp 98.2 °F (36.8 °C) (Oral)   Resp 17   Wt 258 lb 6.1 oz (117.2 kg)   SpO2 93%   BMI 44.35 kg/m²    General (appearance):  No acute distress  Eyes: anicteric   Neck: soft, No JVD  Ears/Nose/Mouth/Thorat: No cyanosis  CV: RRR   Respiratory:  clear, normal effort  GI: soft, non-tender, non-distended  Skin: Warm, dry. No rashes  Neuro/Psych: Alert and oriented x 3.  Appropriate behavior  Ext:  No c/c. TR LE edema  Pulses:  2+ radial     I.O's=     Weight  Admission: Weight: 247 lb 9.6 oz (112.3 kg)   Today: Weight: 258 lb 6.1 oz (117.2 kg)    CBC:   Recent Labs     03/06/23  1900 03/07/23  0549 03/08/23  0400   WBC 8.6 9.7 8.1   HGB 14.1 12.5 11.1*   HCT 42.2 36.8 32.3*   MCV 84.0 83.9 83.8    160 173     BMP:   Recent Labs     03/07/23  0045 03/07/23  0549 03/08/23  0400   * 133* 138   K 4.5 4.1 3.8   CL 96* 99 105   CO2 25 22 25   PHOS  --  2.8 2.3*   BUN 11 10 14   CREATININE 0.7 0.6 0.6     Estimated Creatinine Clearance: 124 mL/min (based on SCr of 0.6 mg/dL). Mag:   Lab Results   Component Value Date/Time    MG 1.90 03/08/2023 04:00 AM     LIVER PROFILE:   Recent Labs     03/06/23  1900 03/07/23  0549 03/08/23  0400   AST 40* 87* 29   ALT 86* 109* 72*   LIPASE 14.0  --   --    BILIDIR <0.2 <0.2 <0.2   BILITOT 0.7 0.6 0.4   ALKPHOS 146* 194* 150*     Imaging:    3/7/2023 Echo   Left ventricular cavity size is normal with mild LVH   Ejection fraction is estimated to be 55-60%. Normal diastolic function. Normal right ventricular size and function. Mild mitral regurgitation. Mild tricuspid regurgitation. Assessment:  AF RVR  PVC  CVA  Abdominal pain     Plan:  -Keep K>4, Mg>2.  -Eliquis 5 mg po bid if no plans for invasive procedures  -Metoprolol 25 mg po bid.  Hold for SBP< 100 mmHg or HR <60 bpm    -EP to sign off  Call with concerns

## 2023-03-08 NOTE — PROCEDURES
Evaluated midline, midline noted to be clotted, unable to salvage, midline d/c'd, USG PIV placed at this time. Okay per Dr Rose Lynn for USG PIV at this time.

## 2023-03-08 NOTE — PROGRESS NOTES
Progress Note    Admit Date: 3/6/2023  Day: 2  Diet: ADULT DIET; Regular    CC: Abdominal pain    Interval history:   Patient seen and examined at bedside this morning. NAEO. Patient states that RUQ abdominal pain is worse today rating it 7/10 with some associated nausea but no vomiting. Patient states that she continues to feel constipated and has passed little hard pellets following enema yesterday. Patient otherwise denies any chest pain or SOB. HPI:   Shadi Thompson is a 65 y.o. female, with PMHx of CVA, DM and obesity , and SMHx cholecystectomy in [de-identified], and hysterectomy presented to the ED with a continued abdominal pain for last 4 days     Patient reported that she started having abdominal pain on Thursday (4 days ago). It was acute in onset, progressively gotten worse, on the right upper quadrant felt like sharp tearing pain, and diffuse abdominal dull pain and rest of the abdomen, associated with an episode of vomiting. Vomitus was none biliary, nonbloody, watery fluid. On Friday and Saturday pain waxed and waned but overall progressively got worse ,on Saturday she went to the [facility] for evaluation. CT scan done and she was admitted, however she was not allowed to drink anything that is when she decided to leave AMA. Next morning she went to [facility] as her pain got even worse, but had a conflict with the nurse that is when she decided to go to the [facility].  And cries she got symptomatic treatment and was sent home on antispasmodic medications. Around 2 PM today she started having even worse pain that is when she decided to come to the [facility]      Patient believes she had been having flulike symptoms, and had sweats chills 1 day and last 4 days which got resolved by itself.        On review of system she reported that she usually has a whole week of constipation and it gets resolved the next week however she denies any fever, diarrhea, urinary symptoms, recent weight gain, or weight loss. She denies any cold or heat intolerance. ROS:  As per HPI     ED Course:  Oriented x 3, NAD  Vitals: BP of  167/84, HR of 107, Temp of 100.4, SpO2 of 95  Physical Exam:   Labs: Na 131, K4.2, Glucose 338, LFTs: , , AST 40, CBC WNL, UA showed ketones and Glucose  Imaging: KUB; no evidence of obstruction, Retained stone the right hemicolon. Air is present in the left hemicolon  Treatment: Zosyn, LR, Dilaudid, Dicyclomine     Summary of Clinical Encounters:  03/06/23: [facility] and [facility] for ABD PAIN, CT was done which was mainly unremarkable but it did show 2.3 and 1.3 cm right and left adrenal nodules are indeterminate.         Medications:     Scheduled Meds:   senna  1 tablet Oral BID    sodium chloride flush  5-40 mL IntraVENous 2 times per day    enoxaparin  30 mg SubCUTAneous BID    insulin lispro  0-4 Units SubCUTAneous TID WC    miconazole   Topical BID    insulin lispro  0-4 Units SubCUTAneous Nightly    sodium chloride flush  5-40 mL IntraVENous 2 times per day    meropenem  1,000 mg IntraVENous Q8H    docusate sodium  100 mg Oral BID    polyethylene glycol  17 g Oral BID    ketorolac  15 mg IntraVENous Once    digoxin  500 mcg IntraVENous Once    insulin glargine  5 Units SubCUTAneous Nightly    dicyclomine  20 mg IntraMUSCular Once     Continuous Infusions:   sodium chloride 25 mL (03/07/23 0920)    dextrose      sodium chloride       PRN Meds:acetaminophen **OR** acetaminophen, sodium chloride flush, sodium chloride, ondansetron **OR** ondansetron, glucose, dextrose bolus **OR** dextrose bolus, glucagon (rDNA), dextrose, sodium chloride flush, sodium chloride    Objective:   Vitals:   T-max:  Patient Vitals for the past 8 hrs:   BP Temp Temp src Pulse Resp SpO2 Weight   03/08/23 0900 (!) 141/86 98.2 °F (36.8 °C) Oral 95 17 93 % --   03/08/23 0752 -- -- -- -- 18 -- --   03/08/23 0410 -- -- -- -- -- -- 258 lb 6.1 oz (117.2 kg) 03/08/23 0352 (!) 140/66 98 °F (36.7 °C) Axillary 97 16 97 % --       Intake/Output Summary (Last 24 hours) at 3/8/2023 4816  Last data filed at 3/7/2023 1030  Gross per 24 hour   Intake 0 ml   Output --   Net 0 ml       Review of Systems  Per history    Physical Exam  Constitutional:       General: She is not in acute distress. HENT:      Mouth/Throat:      Pharynx: Oropharynx is clear. Eyes:      Conjunctiva/sclera: Conjunctivae normal.      Pupils: Pupils are equal, round, and reactive to light. Cardiovascular:      Rate and Rhythm: Normal rate. Heart sounds: No murmur heard. Pulmonary:      Effort: Pulmonary effort is normal. No respiratory distress. Breath sounds: Normal breath sounds. Abdominal:      General: Abdomen is flat. Bowel sounds are normal.      Palpations: Abdomen is soft. Tenderness: There is abdominal tenderness (maximally in RUQ tenderness). Skin:     Findings: No rash. Neurological:      Mental Status: She is alert and oriented to person, place, and time.       Comments: Chronic right upper and lower extremity weakness from prior stroke   Psychiatric:         Mood and Affect: Mood normal.         Behavior: Behavior normal.       LABS:    CBC:   Recent Labs     03/06/23  1900 03/07/23  0549 03/08/23  0400   WBC 8.6 9.7 8.1   HGB 14.1 12.5 11.1*   HCT 42.2 36.8 32.3*    160 173   MCV 84.0 83.9 83.8     Renal:    Recent Labs     03/07/23  0045 03/07/23  0549 03/08/23  0400   * 133* 138   K 4.5 4.1 3.8   CL 96* 99 105   CO2 25 22 25   BUN 11 10 14   CREATININE 0.7 0.6 0.6   GLUCOSE 287* 261* 215*   CALCIUM 9.3 8.7 8.1*   MG  --  1.70* 1.90   PHOS  --  2.8 2.3*   ANIONGAP 12 12 8     Hepatic:   Recent Labs     03/06/23  1900 03/07/23  0549 03/08/23  0400   AST 40* 87*  --    ALT 86* 109*  --    BILITOT 0.7 0.6  --    BILIDIR <0.2 <0.2  --    PROT 7.3 6.0*  --    LABALBU 3.7 3.1* 2.8*   ALKPHOS 146* 194*  --      Troponin: No results for input(s): TROPONINI in the last 72 hours.  BNP: No results for input(s): BNP in the last 72 hours.  Lipids: No results for input(s): CHOL, HDL in the last 72 hours.    Invalid input(s): LDLCALCU, TRIGLYCERIDE  ABGs:  No results for input(s): PHART, JZH5LFQ, PO2ART, YOB2KRO, BEART, THGBART, U0FCEQYD, LMX4TSM in the last 72 hours.    INR: No results for input(s): INR in the last 72 hours.  Lactate: No results for input(s): LACTATE in the last 72 hours.  Cultures:  -----------------------------------------------------------------  RAD:   XR CHEST PORTABLE   Final Result      1.  Diffuse interstitial opacities favored to reflect interstitial edema.   2.  Indeterminate nodular opacity projecting over the right hilum. Recommend correlation with recent outside chest CT.         XR ABDOMEN (KUB) (SINGLE AP VIEW)   Final Result   Addendum (preliminary) 1 of 1   [** ADDENDUM #1 **   Addendum: By Dr. Pascual Hernandez   Correction of typographical error      Within the body the report, the statement should read the following.      Retained stool within the right hemicolon. Air is present in the left    hemicolon. No evidence of fecal impaction         Final   1. No evidence of obstruction                Assessment/Plan:   Denisha Gutierrez is a 62 y.o. female, with PMHx of CVA, DM and obesity , and SHx cholecystectomy in 80s, and hysterectomy presented to the ED with a continued abdominal pain for last 4 days     Acute abdominal pain possibly 2/2 to gastroparesis and constipation  Patient has a history of chronic constipation presented with continued abdominal pain for last 4 days, tried multiple antispasmodic and painkillers, pain was progressively getting worse. CT abdomen on 3/5 prior to presentation with no acute process. Has been intermittently febrile to Tmax of 100.7. On presentation Alk 146, ALT 86, AST 40. Initial KUB with retained stool within the right hemicolon and air present in the left hemicolon.   -PRN Zofran  -Advance diet to regular  adult diet  -Merrem  -Blood cultures pending, viral hepatitis pending  -Surgery following  -GI consulted, believe symptoms are 2/2 to uncontrolled diabetes and possibly to constipation  -Senokot 8.6mg BID  -CT abdomen with IV and oral contrast today  -Check sed rate and CRP     Afib with RVR  Patient has a history of Afib was recommended by cardiology to be on Eliquis and metoprolol however patient stopped taking as she believes they are causing her to have side effects. Went into Afib with RVR with HR in the 170-190s on morning of 3/7, was placed on a dilt gtt. Echo showed EF of 55-60% with normal diastolic function.  -Cardiology following  -Metoprolol 25mg BID    Hyperglycemia  Likely secondary to uncontrolled diabetes  Most recent HbA1c is 8.7 12/6/2022. Patient does not report taking any medication for diabetes. POCT glucose 338 in the ED  -Hypoglycemia protocol  -Low-dose sliding scale insulin  -5U lantus nightly     DVT PPx: Lovenox  Diet: Regular adult diet  Code status:  Full Code   Dispo: Corby Fuentes MD, PGY-1  03/08/23  9:21 AM    This patient has been staffed and discussed with Kendy Pinzon MD.   Patient seen and examined, labs and imaging studies reviewed, agree with assessment and plan as outlined above. Continue with current care and plan. Discussed case with patients nurse, discussed case with care team, discussed plan. D/w radiology will check cta, patient had acute pain after eating, she may need endoscopy as well.        MD Justina Mendoza

## 2023-03-09 ENCOUNTER — ANESTHESIA (OUTPATIENT)
Dept: ENDOSCOPY | Age: 63
End: 2023-03-09
Payer: COMMERCIAL

## 2023-03-09 ENCOUNTER — ANESTHESIA EVENT (OUTPATIENT)
Dept: ENDOSCOPY | Age: 63
End: 2023-03-09
Payer: COMMERCIAL

## 2023-03-09 PROBLEM — R74.01 TRANSAMINITIS: Status: ACTIVE | Noted: 2023-03-09

## 2023-03-09 PROBLEM — I48.0 PAF (PAROXYSMAL ATRIAL FIBRILLATION) (HCC): Status: ACTIVE | Noted: 2023-03-09

## 2023-03-09 LAB
ALBUMIN SERPL-MCNC: 3 G/DL (ref 3.4–5)
ANION GAP SERPL CALCULATED.3IONS-SCNC: 9 MMOL/L (ref 3–16)
BASOPHILS ABSOLUTE: 0 K/UL (ref 0–0.2)
BASOPHILS RELATIVE PERCENT: 0.7 %
BUN BLDV-MCNC: 9 MG/DL (ref 7–20)
C-REACTIVE PROTEIN: 189.4 MG/L (ref 0–5.1)
CALCIUM SERPL-MCNC: 8.6 MG/DL (ref 8.3–10.6)
CHLORIDE BLD-SCNC: 102 MMOL/L (ref 99–110)
CO2: 26 MMOL/L (ref 21–32)
CREAT SERPL-MCNC: 0.5 MG/DL (ref 0.6–1.2)
EKG ATRIAL RATE: 101 BPM
EKG DIAGNOSIS: NORMAL
EKG Q-T INTERVAL: 296 MS
EKG QRS DURATION: 92 MS
EKG QTC CALCULATION (BAZETT): 499 MS
EKG R AXIS: 68 DEGREES
EKG T AXIS: 228 DEGREES
EKG VENTRICULAR RATE: 171 BPM
EOSINOPHILS ABSOLUTE: 0.2 K/UL (ref 0–0.6)
EOSINOPHILS RELATIVE PERCENT: 3.3 %
GFR SERPL CREATININE-BSD FRML MDRD: >60 ML/MIN/{1.73_M2}
GLUCOSE BLD-MCNC: 131 MG/DL (ref 70–99)
GLUCOSE BLD-MCNC: 155 MG/DL (ref 70–99)
GLUCOSE BLD-MCNC: 168 MG/DL (ref 70–99)
GLUCOSE BLD-MCNC: 196 MG/DL (ref 70–99)
HCT VFR BLD CALC: 40.5 % (ref 36–48)
HEMOGLOBIN: 12.7 G/DL (ref 12–16)
LYMPHOCYTES ABSOLUTE: 1.3 K/UL (ref 1–5.1)
LYMPHOCYTES RELATIVE PERCENT: 21.1 %
MAGNESIUM: 1.9 MG/DL (ref 1.8–2.4)
MCH RBC QN AUTO: 27.6 PG (ref 26–34)
MCHC RBC AUTO-ENTMCNC: 31.5 G/DL (ref 31–36)
MCV RBC AUTO: 87.8 FL (ref 80–100)
MONOCYTES ABSOLUTE: 0.6 K/UL (ref 0–1.3)
MONOCYTES RELATIVE PERCENT: 10.2 %
NEUTROPHILS ABSOLUTE: 3.9 K/UL (ref 1.7–7.7)
NEUTROPHILS RELATIVE PERCENT: 64.7 %
PDW BLD-RTO: 14.3 % (ref 12.4–15.4)
PERFORMED ON: ABNORMAL
PHOSPHORUS: 2.1 MG/DL (ref 2.5–4.9)
PLATELET # BLD: 219 K/UL (ref 135–450)
PMV BLD AUTO: 7.7 FL (ref 5–10.5)
POTASSIUM SERPL-SCNC: 4.3 MMOL/L (ref 3.5–5.1)
RBC # BLD: 4.61 M/UL (ref 4–5.2)
SODIUM BLD-SCNC: 137 MMOL/L (ref 136–145)
T4 FREE: 1.3 NG/DL (ref 0.9–1.8)
TSH SERPL DL<=0.05 MIU/L-ACNC: 1.96 UIU/ML (ref 0.27–4.2)
WBC # BLD: 6 K/UL (ref 4–11)

## 2023-03-09 PROCEDURE — 0DJ08ZZ INSPECTION OF UPPER INTESTINAL TRACT, VIA NATURAL OR ARTIFICIAL OPENING ENDOSCOPIC: ICD-10-PCS | Performed by: INTERNAL MEDICINE

## 2023-03-09 PROCEDURE — C9113 INJ PANTOPRAZOLE SODIUM, VIA: HCPCS | Performed by: INTERNAL MEDICINE

## 2023-03-09 PROCEDURE — 36415 COLL VENOUS BLD VENIPUNCTURE: CPT

## 2023-03-09 PROCEDURE — 99231 SBSQ HOSP IP/OBS SF/LOW 25: CPT | Performed by: SURGERY

## 2023-03-09 PROCEDURE — 86038 ANTINUCLEAR ANTIBODIES: CPT

## 2023-03-09 PROCEDURE — 2500000003 HC RX 250 WO HCPCS: Performed by: INTERNAL MEDICINE

## 2023-03-09 PROCEDURE — 93010 ELECTROCARDIOGRAM REPORT: CPT | Performed by: INTERNAL MEDICINE

## 2023-03-09 PROCEDURE — 6360000002 HC RX W HCPCS: Performed by: INTERNAL MEDICINE

## 2023-03-09 PROCEDURE — 2580000003 HC RX 258: Performed by: INTERNAL MEDICINE

## 2023-03-09 PROCEDURE — 93005 ELECTROCARDIOGRAM TRACING: CPT

## 2023-03-09 PROCEDURE — 84439 ASSAY OF FREE THYROXINE: CPT

## 2023-03-09 PROCEDURE — 6370000000 HC RX 637 (ALT 250 FOR IP)

## 2023-03-09 PROCEDURE — 6370000000 HC RX 637 (ALT 250 FOR IP): Performed by: STUDENT IN AN ORGANIZED HEALTH CARE EDUCATION/TRAINING PROGRAM

## 2023-03-09 PROCEDURE — 7100000011 HC PHASE II RECOVERY - ADDTL 15 MIN: Performed by: INTERNAL MEDICINE

## 2023-03-09 PROCEDURE — 84443 ASSAY THYROID STIM HORMONE: CPT

## 2023-03-09 PROCEDURE — 2580000003 HC RX 258

## 2023-03-09 PROCEDURE — 2060000000 HC ICU INTERMEDIATE R&B

## 2023-03-09 PROCEDURE — 83735 ASSAY OF MAGNESIUM: CPT

## 2023-03-09 PROCEDURE — 6360000002 HC RX W HCPCS

## 2023-03-09 PROCEDURE — 6370000000 HC RX 637 (ALT 250 FOR IP): Performed by: INTERNAL MEDICINE

## 2023-03-09 PROCEDURE — 2500000003 HC RX 250 WO HCPCS

## 2023-03-09 PROCEDURE — 6360000002 HC RX W HCPCS: Performed by: NURSE ANESTHETIST, CERTIFIED REGISTERED

## 2023-03-09 PROCEDURE — 3700000000 HC ANESTHESIA ATTENDED CARE: Performed by: INTERNAL MEDICINE

## 2023-03-09 PROCEDURE — 6370000000 HC RX 637 (ALT 250 FOR IP): Performed by: NURSE PRACTITIONER

## 2023-03-09 PROCEDURE — 99233 SBSQ HOSP IP/OBS HIGH 50: CPT | Performed by: INTERNAL MEDICINE

## 2023-03-09 PROCEDURE — 85025 COMPLETE CBC W/AUTO DIFF WBC: CPT

## 2023-03-09 PROCEDURE — 3609017100 HC EGD: Performed by: INTERNAL MEDICINE

## 2023-03-09 PROCEDURE — 80069 RENAL FUNCTION PANEL: CPT

## 2023-03-09 PROCEDURE — 7100000010 HC PHASE II RECOVERY - FIRST 15 MIN: Performed by: INTERNAL MEDICINE

## 2023-03-09 PROCEDURE — 94761 N-INVAS EAR/PLS OXIMETRY MLT: CPT

## 2023-03-09 PROCEDURE — 93005 ELECTROCARDIOGRAM TRACING: CPT | Performed by: INTERNAL MEDICINE

## 2023-03-09 PROCEDURE — 2500000003 HC RX 250 WO HCPCS: Performed by: NURSE ANESTHETIST, CERTIFIED REGISTERED

## 2023-03-09 RX ORDER — PROPAFENONE HYDROCHLORIDE 225 MG/1
225 CAPSULE, EXTENDED RELEASE ORAL EVERY 12 HOURS SCHEDULED
Status: DISCONTINUED | OUTPATIENT
Start: 2023-03-10 | End: 2023-03-10 | Stop reason: HOSPADM

## 2023-03-09 RX ORDER — DIGOXIN 0.25 MG/ML
250 INJECTION INTRAMUSCULAR; INTRAVENOUS ONCE
Status: COMPLETED | OUTPATIENT
Start: 2023-03-09 | End: 2023-03-09

## 2023-03-09 RX ORDER — PROPAFENONE HYDROCHLORIDE 150 MG/1
600 TABLET, FILM COATED ORAL ONCE
Status: COMPLETED | OUTPATIENT
Start: 2023-03-09 | End: 2023-03-09

## 2023-03-09 RX ORDER — PROPOFOL 10 MG/ML
INJECTION, EMULSION INTRAVENOUS CONTINUOUS PRN
Status: DISCONTINUED | OUTPATIENT
Start: 2023-03-09 | End: 2023-03-09 | Stop reason: SDUPTHER

## 2023-03-09 RX ORDER — FLUCONAZOLE 200 MG/1
100 TABLET ORAL DAILY
Status: DISCONTINUED | OUTPATIENT
Start: 2023-03-10 | End: 2023-03-10 | Stop reason: HOSPADM

## 2023-03-09 RX ORDER — DIGOXIN 0.25 MG/ML
500 INJECTION INTRAMUSCULAR; INTRAVENOUS ONCE
Status: DISCONTINUED | OUTPATIENT
Start: 2023-03-09 | End: 2023-03-09

## 2023-03-09 RX ORDER — LIDOCAINE HYDROCHLORIDE 20 MG/ML
INJECTION, SOLUTION INFILTRATION; PERINEURAL PRN
Status: DISCONTINUED | OUTPATIENT
Start: 2023-03-09 | End: 2023-03-09 | Stop reason: SDUPTHER

## 2023-03-09 RX ORDER — FLUCONAZOLE 200 MG/1
200 TABLET ORAL ONCE
Status: COMPLETED | OUTPATIENT
Start: 2023-03-09 | End: 2023-03-09

## 2023-03-09 RX ORDER — PANTOPRAZOLE SODIUM 40 MG/1
40 TABLET, DELAYED RELEASE ORAL
Status: DISCONTINUED | OUTPATIENT
Start: 2023-03-10 | End: 2023-03-10 | Stop reason: HOSPADM

## 2023-03-09 RX ORDER — PROPOFOL 10 MG/ML
INJECTION, EMULSION INTRAVENOUS PRN
Status: DISCONTINUED | OUTPATIENT
Start: 2023-03-09 | End: 2023-03-09 | Stop reason: SDUPTHER

## 2023-03-09 RX ORDER — METOPROLOL TARTRATE 5 MG/5ML
5 INJECTION INTRAVENOUS ONCE
Status: DISCONTINUED | OUTPATIENT
Start: 2023-03-09 | End: 2023-03-10 | Stop reason: HOSPADM

## 2023-03-09 RX ORDER — ENOXAPARIN SODIUM 150 MG/ML
1 INJECTION SUBCUTANEOUS 2 TIMES DAILY
Status: DISCONTINUED | OUTPATIENT
Start: 2023-03-09 | End: 2023-03-10 | Stop reason: HOSPADM

## 2023-03-09 RX ADMIN — SODIUM PHOSPHATE, MONOBASIC, MONOHYDRATE 30 MMOL: 276; 142 INJECTION, SOLUTION INTRAVENOUS at 10:54

## 2023-03-09 RX ADMIN — MEROPENEM 1000 MG: 1 INJECTION INTRAVENOUS at 01:11

## 2023-03-09 RX ADMIN — SODIUM CHLORIDE, PRESERVATIVE FREE 10 ML: 5 INJECTION INTRAVENOUS at 20:31

## 2023-03-09 RX ADMIN — MICONAZOLE NITRATE: 20 POWDER TOPICAL at 08:46

## 2023-03-09 RX ADMIN — DIGOXIN 500 MCG: 0.25 INJECTION INTRAMUSCULAR; INTRAVENOUS at 09:31

## 2023-03-09 RX ADMIN — ENOXAPARIN SODIUM 30 MG: 100 INJECTION SUBCUTANEOUS at 08:45

## 2023-03-09 RX ADMIN — ENOXAPARIN SODIUM 120 MG: 150 INJECTION SUBCUTANEOUS at 20:18

## 2023-03-09 RX ADMIN — MEROPENEM 1000 MG: 1 INJECTION INTRAVENOUS at 18:26

## 2023-03-09 RX ADMIN — MEROPENEM 1000 MG: 1 INJECTION INTRAVENOUS at 08:53

## 2023-03-09 RX ADMIN — DILTIAZEM HYDROCHLORIDE 5 MG/HR: 5 INJECTION, SOLUTION INTRAVENOUS at 11:55

## 2023-03-09 RX ADMIN — FLUCONAZOLE 200 MG: 200 TABLET ORAL at 20:20

## 2023-03-09 RX ADMIN — HYDROMORPHONE HYDROCHLORIDE 0.25 MG: 1 INJECTION, SOLUTION INTRAMUSCULAR; INTRAVENOUS; SUBCUTANEOUS at 00:25

## 2023-03-09 RX ADMIN — SODIUM CHLORIDE 25 ML: 9 INJECTION, SOLUTION INTRAVENOUS at 01:10

## 2023-03-09 RX ADMIN — SUCRALFATE 1 G: 1 TABLET ORAL at 21:53

## 2023-03-09 RX ADMIN — METOPROLOL TARTRATE 25 MG: 25 TABLET, FILM COATED ORAL at 08:45

## 2023-03-09 RX ADMIN — METOPROLOL TARTRATE 25 MG: 25 TABLET, FILM COATED ORAL at 20:19

## 2023-03-09 RX ADMIN — MICONAZOLE NITRATE: 20 POWDER TOPICAL at 20:22

## 2023-03-09 RX ADMIN — ENOXAPARIN SODIUM 120 MG: 150 INJECTION SUBCUTANEOUS at 10:48

## 2023-03-09 RX ADMIN — PROPOFOL 200 MCG/KG/MIN: 10 INJECTION, EMULSION INTRAVENOUS at 17:28

## 2023-03-09 RX ADMIN — DIGOXIN 250 MCG: 0.25 INJECTION INTRAMUSCULAR; INTRAVENOUS at 11:50

## 2023-03-09 RX ADMIN — HYDROMORPHONE HYDROCHLORIDE 0.25 MG: 1 INJECTION, SOLUTION INTRAMUSCULAR; INTRAVENOUS; SUBCUTANEOUS at 21:47

## 2023-03-09 RX ADMIN — SODIUM CHLORIDE, PRESERVATIVE FREE 10 ML: 5 INJECTION INTRAVENOUS at 08:56

## 2023-03-09 RX ADMIN — KETOROLAC TROMETHAMINE 15 MG: 15 INJECTION, SOLUTION INTRAMUSCULAR; INTRAVENOUS at 06:51

## 2023-03-09 RX ADMIN — PROPAFENONE HYDROCHLORIDE 600 MG: 150 TABLET, FILM COATED ORAL at 10:41

## 2023-03-09 RX ADMIN — PROPOFOL 50 MG: 10 INJECTION, EMULSION INTRAVENOUS at 17:28

## 2023-03-09 RX ADMIN — MICONAZOLE NITRATE: 20 POWDER TOPICAL at 10:45

## 2023-03-09 RX ADMIN — LIDOCAINE HYDROCHLORIDE 100 MG: 20 INJECTION, SOLUTION INFILTRATION; PERINEURAL at 17:28

## 2023-03-09 RX ADMIN — DILTIAZEM HYDROCHLORIDE 2.5 MG/HR: 5 INJECTION INTRAVENOUS at 15:30

## 2023-03-09 RX ADMIN — DOCUSATE SODIUM 100 MG: 100 CAPSULE, LIQUID FILLED ORAL at 08:46

## 2023-03-09 RX ADMIN — ENOXAPARIN SODIUM 30 MG: 100 INJECTION SUBCUTANEOUS at 10:16

## 2023-03-09 ASSESSMENT — PAIN DESCRIPTION - FREQUENCY
FREQUENCY: CONTINUOUS

## 2023-03-09 ASSESSMENT — PAIN SCALES - GENERAL
PAINLEVEL_OUTOF10: 8
PAINLEVEL_OUTOF10: 7
PAINLEVEL_OUTOF10: 9
PAINLEVEL_OUTOF10: 7
PAINLEVEL_OUTOF10: 4
PAINLEVEL_OUTOF10: 10
PAINLEVEL_OUTOF10: 8

## 2023-03-09 ASSESSMENT — PAIN - FUNCTIONAL ASSESSMENT
PAIN_FUNCTIONAL_ASSESSMENT: NONE - DENIES PAIN
PAIN_FUNCTIONAL_ASSESSMENT: WONG-BAKER FACES
PAIN_FUNCTIONAL_ASSESSMENT: WONG-BAKER FACES
PAIN_FUNCTIONAL_ASSESSMENT: PREVENTS OR INTERFERES SOME ACTIVE ACTIVITIES AND ADLS
PAIN_FUNCTIONAL_ASSESSMENT: PREVENTS OR INTERFERES SOME ACTIVE ACTIVITIES AND ADLS
PAIN_FUNCTIONAL_ASSESSMENT: WONG-BAKER FACES
PAIN_FUNCTIONAL_ASSESSMENT: PREVENTS OR INTERFERES SOME ACTIVE ACTIVITIES AND ADLS
PAIN_FUNCTIONAL_ASSESSMENT: ACTIVITIES ARE NOT PREVENTED
PAIN_FUNCTIONAL_ASSESSMENT: PREVENTS OR INTERFERES SOME ACTIVE ACTIVITIES AND ADLS
PAIN_FUNCTIONAL_ASSESSMENT: WONG-BAKER FACES

## 2023-03-09 ASSESSMENT — PAIN DESCRIPTION - DESCRIPTORS
DESCRIPTORS: ACHING;STABBING
DESCRIPTORS: ACHING;DISCOMFORT;SHARP
DESCRIPTORS: ACHING
DESCRIPTORS: ACHING;DISCOMFORT
DESCRIPTORS: ACHING;STABBING

## 2023-03-09 ASSESSMENT — PAIN DESCRIPTION - ORIENTATION
ORIENTATION: RIGHT
ORIENTATION: RIGHT
ORIENTATION: MID;RIGHT
ORIENTATION: RIGHT
ORIENTATION: RIGHT

## 2023-03-09 ASSESSMENT — PAIN DESCRIPTION - LOCATION
LOCATION: ABDOMEN

## 2023-03-09 ASSESSMENT — PAIN DESCRIPTION - ONSET
ONSET: SUDDEN
ONSET: ON-GOING

## 2023-03-09 ASSESSMENT — PAIN DESCRIPTION - PAIN TYPE
TYPE: ACUTE PAIN

## 2023-03-09 NOTE — PROGRESS NOTES
EP note      CC: recurrent afib     HPI:  Shadi Thompson is a 65 y.o. female with a past medical history significant for type 2 diabetes mellitus, CVA, obesity, BMI 40, depression was admitted to the hospital secondary to history of abdominal pain for the past 4 days. She went into atrial fibrillation with rapid ventricular response with a heart rate of 190 bpm.  Hence, initially she was tried on IV adenosine and later on with IV metoprolol. She was also initiated on Cardizem infusion. She spontaneously converted. Of note, patient is followed by EP in 2015 for history of stroke and implantable loop recorder. At that time, she had normal LV ejection fraction. 5.5 hours of Afib longest, but another episode as well  was noted on ILR per Dr. Olga Seaman note. She declined [address] Despite st depression during Afib  no sob or chest pain. She had recurrent afib today and Cards follow up requested. ----------------------------------------------------------------------------------------  S: Denies cp, sob or palpitations. C/o abdominal pain     Tele: Sinus     O:  Physical Exam:  /74   Pulse (!) 129   Temp 97.7 °F (36.5 °C) (Oral)   Resp 20   Wt 257 lb 15 oz (117 kg)   SpO2 96%   BMI 44.27 kg/m²    General (appearance):  No acute distress obese  Eyes: anicteric   Neck: soft, No JVD  Ears/Nose/Mouth/Thorat: No cyanosis  CV: irreg rapid RR   Respiratory:  clear, normal effort  GI: soft, non-tender, non-distended  Skin: Warm, dry. No rashes  Neuro/Psych: Alert and oriented x 3.  Appropriate behavior  Ext:  No c/c. TR LE edema  Pulses:  2+ radial right    I.O's=     Weight  Admission: Weight: 247 lb 9.6 oz (112.3 kg)   Today: Weight: 257 lb 15 oz (117 kg)    CBC:   Recent Labs     03/07/23  0549 03/08/23  0400 03/09/23  0546   WBC 9.7 8.1 6.0   HGB 12.5 11.1* 12.7   HCT 36.8 32.3* 40.5   MCV 83.9 83.8 87.8    173 219       BMP:   Recent Labs     03/07/23  0549 03/08/23  0400 03/09/23  0546   * 138 137   K 4.1 3.8 4.3   CL 99 105 102   CO2 22 25 26   PHOS 2.8 2.3* 2.1*   BUN 10 14 9   CREATININE 0.6 0.6 0.5*       Estimated Creatinine Clearance: 148 mL/min (A) (based on SCr of 0.5 mg/dL (L)). Mag:   Lab Results   Component Value Date/Time    MG 1.90 03/09/2023 05:46 AM     LIVER PROFILE:   Recent Labs     03/06/23  1900 03/07/23  0549 03/08/23  0400   AST 40* 87* 29   ALT 86* 109* 72*   LIPASE 14.0  --  11.0*   BILIDIR <0.2 <0.2 <0.2   BILITOT 0.7 0.6 0.4   ALKPHOS 146* 194* 150*       Imaging:    3/7/2023 Echo   Left ventricular cavity size is normal with mild LVH   Ejection fraction is estimated to be 55-60%. Normal diastolic function. Normal right ventricular size and function. Mild mitral regurgitation. Mild tricuspid regurgitation. Assessment:  AF RVR again today    She is asymptoamtic   Abdominal pain     Plan:  Dig load  Iv cardizem  600 mg po propafenone  SQ lovenox  Consider Eliquis 5 mg po bid if no plans for invasive procedures    Reviewed with Dr. Rosalia Burkitt, patient and     Cordell GodfreyTitus Freeman MD

## 2023-03-09 NOTE — PROGRESS NOTES
Patient's HR flipped to uncontrolled A fib in 190's at 0900- patient was given morning meds which included metoprolol. Cardiology aware and they ordered metoprolol IV- before that was verified-  [employer] called and stated patient had run of [address] [employer]. Patient asymptomatic with mild SOB, placed on 2 liters of O2. Rapid response called at 0915. IV metoprolol not given. Rapid response team arrived 4352. Crash cart pads placed on patient. 500cc bolus started at 920- unable to get accurate bp reading w/ manual and automatic. Stat EKG ordered. Patient placed in trendelenberg position. Patient pleasant and making jokes with staff. See vitals per flowsheets. 0926-stat EKG obtained- Afib  0931- 500mcg Digoxin ordered and given. Cardiology at bedside ordered Cardiem drip. 1041-Propafenone given. PO  BP now stable. Patient cleaned up, linens and gown changed, pure wick changed, patient resting comfortably in bed.

## 2023-03-09 NOTE — PROGRESS NOTES
Pt stated to be in 10/10 abdominal pain at 0433. [employer] message sent to MD requesting order for pain meds d/t tylenol being only PRN on board for pain scale 1-6/10. Message viewed with no response or new orders provided. RN sent a second [employer] message with same request at [address] [place] [address]. MD stated he was coming to bedside. MD has not presented to pt bedside. RN and charge RN reviewed [facility] and RN offered pt a one-time dose of toradol that was ordered previously but never given. Pt declined medication stating \"I want the doctor to see me like this\". Pt visibly agitated and frustrated. MD has not presented to bedside and no new orders at this time.     Electronically signed by Shady Quintanilla RN on 3/9/2023 at 6:25 AM

## 2023-03-09 NOTE — PROGRESS NOTES
Pt flipped back to NSR and hr in the 70s now, EKG ordered for rhythm change and MD notified  Electronically signed by Jasper Diamond RN on 3/9/2023 at 2:50 PM

## 2023-03-09 NOTE — PROGRESS NOTES
Bariatric Surgery   Daily Progress Note  Patient: Saray Turner    CC: Abdominal Pain    SUBJECTIVE:  Still having abdominal pian. Upset that she had pain overnight with no medication given, however she states this pain is not a stomach problem. ROS: A 14 point review of systems was conducted, significant findings as noted above. All other systems negative. OBJECTIVE:   Infusions:   sodium chloride      sodium chloride 25 mL (03/09/23 0110)    dextrose      sodium chloride        I/O:I/O last 3 completed shifts: In: 1620 [P.O.:1620]  Out: 500 [Urine:500]           Wt Readings from Last 1 Encounters:   03/09/23 257 lb 15 oz (117 kg)       Exam:  BP (!) 132/93   Pulse 94   Temp 98 °F (36.7 °C) (Axillary)   Resp 16   Wt 257 lb 15 oz (117 kg)   SpO2 94%   BMI 44.27 kg/m²     General Appearance: in no apparent distress   Neuro: A&Ox3, no focal deficits  Lungs: Normal effort; no adventitious breath sounds  Heart: Regular rate and rhythm  Abdomen: Obese, nonntender, distractable, no rebound or guarding, no rigidity  Extremities: No edema, no cyanosis            LABS:   Recent Labs     03/07/23  0549 03/08/23  0400   WBC 9.7 8.1   HGB 12.5 11.1*   HCT 36.8 32.3*   MCV 83.9 83.8    173          Recent Labs     03/07/23  0549 03/08/23  0400   * 138   K 4.1 3.8   CL 99 105   CO2 22 25   PHOS 2.8 2.3*   BUN 10 14   CREATININE 0.6 0.6          Recent Labs     03/07/23  0549 03/08/23  0400   AST 87* 29   * 72*   BILIDIR <0.2 <0.2   BILITOT 0.6 0.4   ALKPHOS 194* 150*          Recent Labs     03/06/23  1900 03/08/23  0400   LIPASE 14.0 11.0*          Recent Labs     03/07/23  0549 03/08/23  0400   PROT 6.0* 5.5*        No results for input(s): CKTOTAL, CKMB, CKMBINDEX, TROPONINI in the last 72 hours. ASSESSMENT/PLAN:   This is a 65 y.o. female with Hx of DM, CVA, obesity and prior cholecystectomy presenting with right flank/abdominal pain.   Patient with extensive work up across many hospital systems in past few days. - Continue bowel reg and SMOG enemas PRN  - Follow up EGD today  - CTA reviewed, no gross intra-abdominal abnormalities, no mesenteric ischemia, mild SMA stenosis with patency. Right colon with stool, contrast in the small bowel, no gross impaction in rectum.    - Continue medical management per primary     Rosario Epstein DO   PGY1, General Surgery  03/09/23  6:37 AM  Contact via [employer]

## 2023-03-09 NOTE — PROCEDURES
[address] [place] [address]  Endoscopy Note    Patient: Don Silva  : 1960  Acct#:     Procedure: Esophagogastroduodenoscopy with biopsy    Date:  3/9/2023     Surgeon:  Austin Lorenzo MD      Anesthesia:    IV propofol, per anesthesia       EBL: <50 mL    Indications:  RUQ abdominal pain     Description of Procedure:    Informed consent was obtained from the patient after explanation of indications, benefits and possible risks and complications of the procedure. The patient was then taken to the endoscopy suite, placed in the left lateral decubitus position and the above IV sedation was administrered. The Olympus videoendoscope (GIF-H190) was placed in the patient's mouth and under direct visualization passed into the esophagus. The scope was then advanced into the stomach and to the second portion of the duodenum. A retroflexed exam of the gastric cardia and fundus was performed. The scope was then withdrawn back into the stomach, it was decompressed, and the scope was completely withdrawn. Findings:  Normal second third portion of the duodenum. Patchy erythema in the duodenal bulb. A few small 2 to 3 mm ulcers and erosions in the gastric antrum. Grade D candidal esophagitis in the lower two thirds of the esophagus. The patient tolerated the procedure well and was taken to the post anesthesia care unit in good condition. Biopsies: no     Impression:    Normal second third portion of the duodenum. Patchy erythema in the duodenal bulb. A few small 2 to 3 mm ulcers and erosions in the gastric antrum. Grade D candidal esophagitis in the lower two thirds of the esophagus. Recommendations:   PPI daily.   Fluconazole      BRONSON Young MD  [address] [place] [address]/[facility]

## 2023-03-09 NOTE — PLAN OF CARE
Problem: Discharge Planning  Goal: Discharge to home or other facility with appropriate resources  Outcome: Progressing  Flowsheets  Taken 3/9/2023 1851  Discharge to home or other facility with appropriate resources:   Identify barriers to discharge with patient and caregiver   Identify discharge learning needs (meds, wound care, etc)  Taken 3/9/2023 1144  Discharge to home or other facility with appropriate resources: Identify barriers to discharge with patient and caregiver     Problem: Pain  Goal: Verbalizes/displays adequate comfort level or baseline comfort level  Outcome: Progressing  Flowsheets (Taken 3/9/2023 1851)  Verbalizes/displays adequate comfort level or baseline comfort level: Encourage patient to monitor pain and request assistance     Problem: Skin/Tissue Integrity  Goal: Absence of new skin breakdown  Description: 1. Monitor for areas of redness and/or skin breakdown  2. Assess vascular access sites hourly  3. Every 4-6 hours minimum:  Change oxygen saturation probe site  4. Every 4-6 hours:  If on nasal continuous positive airway pressure, respiratory therapy assess nares and determine need for appliance change or resting period.   Outcome: Progressing     Problem: Safety - Adult  Goal: Free from fall injury  Outcome: Progressing  Flowsheets  Taken 3/9/2023 1851  Free From Fall Injury: Instruct family/caregiver on patient safety  Taken 3/9/2023 1144  Free From Fall Injury: Instruct family/caregiver on patient safety     Problem: ABCDS Injury Assessment  Goal: Absence of physical injury  Outcome: Progressing  Flowsheets (Taken 3/9/2023 1144)  Absence of Physical Injury: Implement safety measures based on patient assessment     Problem: Cardiovascular - Adult  Goal: Maintains optimal cardiac output and hemodynamic stability  Outcome: Progressing  Flowsheets (Taken 3/9/2023 1144)  Maintains optimal cardiac output and hemodynamic stability: Monitor blood pressure and heart rate     Problem: Cardiovascular - Adult  Goal: Absence of cardiac dysrhythmias or at baseline  Outcome: Progressing  Flowsheets (Taken 3/9/2023 1144)  Absence of cardiac dysrhythmias or at baseline: Monitor cardiac rate and rhythm     Problem: Chronic Conditions and Co-morbidities  Goal: Patient's chronic conditions and co-morbidity symptoms are monitored and maintained or improved  Outcome: Progressing  Flowsheets (Taken 3/9/2023 1144)  Care Plan - Patient's Chronic Conditions and Co-Morbidity Symptoms are Monitored and Maintained or Improved: Monitor and assess patient's chronic conditions and comorbid symptoms for stability, deterioration, or improvement

## 2023-03-09 NOTE — ANESTHESIA POSTPROCEDURE EVALUATION
Department of Anesthesiology  Postprocedure Note    Patient: Abdelrahman Staples  MRN: [id number]  YOB: 1960  Date of evaluation: 3/9/2023      Procedure Summary     Date: 03/09/23 Room / Location: [address] [place] [address]    Anesthesia Start: 8430 Anesthesia Stop: 5453    Procedure: EGD DIAGNOSTIC ONLY Diagnosis:       Right upper quadrant abdominal pain      (RUQ pain)    Surgeons: Arash Borden MD Responsible Provider: Barry Barkley MD    Anesthesia Type: MAC ASA Status: 3          Anesthesia Type: No value filed.     Fabi Phase I: Fabi Score: 10    Fabi Phase II: Fabi Score: 9      Anesthesia Post Evaluation    Patient location during evaluation: PACU  Patient participation: complete - patient participated  Level of consciousness: awake  Pain score: 0  Nausea & Vomiting: no nausea and no vomiting  Complications: no  Cardiovascular status: blood pressure returned to baseline  Respiratory status: acceptable  Hydration status: euvolemic

## 2023-03-09 NOTE — ANESTHESIA PRE PROCEDURE
Department of Anesthesiology  Preprocedure Note       Name:  Margaux Will   Age:  65 y.o.  :  1960                                          MRN:  [id number]         Date:  3/9/2023      Surgeon: Vaelrie Pond):  Baljit Urbina MD    Procedure: Procedure(s):  EGD DIAGNOSTIC ONLY    Medications prior to admission:   Prior to Admission medications    Medication Sig Start Date End Date Taking?  Authorizing Provider   Potassium 99 MG TABS Take 1 tablet by mouth daily   Yes Historical Provider, MD   MILK THISTLE PO Take 1 capsule by mouth daily   Yes Historical Provider, MD   dicyclomine (BENTYL) 20 MG tablet Take 20 mg by mouth 3 times daily as needed (abdominal pain / cramping)   Yes Historical Provider, MD   ondansetron (ZOFRAN-ODT) 4 MG disintegrating tablet Take 4 mg by mouth every 8 hours as needed for Nausea or Vomiting   Yes Historical Provider, MD   B Complex Vitamins (VITAMIN B COMPLEX) TABS Take 1 each by mouth daily    Historical Provider, MD   vitamin D3 (CHOLECALCIFEROL) 10 MCG (400 UNIT) TABS tablet Take 800 Units by mouth daily    Historical Provider, MD   magnesium 30 MG tablet Take 60 mg by mouth daily 2 capsules    Historical Provider, MD   vitamin C (ASCORBIC ACID) 500 MG tablet Take 1,000 mg by mouth daily    Historical Provider, MD   zinc gluconate 50 MG tablet Take 50 mg by mouth daily    Historical Provider, MD   aspirin 81 MG EC tablet Take 243 mg by mouth daily    Historical Provider, MD   loratadine (CLARITIN) 10 MG tablet Take 10 mg by mouth daily as needed (allergies) 5/26/15   Shira Jones MD       Current medications:    Current Facility-Administered Medications   Medication Dose Route Frequency Provider Last Rate Last Admin    sodium phosphate 30 mmol in sodium chloride 0.9 % 250 mL IVPB  30 mmol IntraVENous Once Suzan Amato DO 41.7 mL/hr at 23 1054 30 mmol at 23 1054    metoprolol (LOPRESSOR) injection 5 mg  5 mg IntraVENous Once MD Sowmya Bailey HYDROmorphone (DILAUDID) injection 0.25 mg  0.25 mg IntraVENous Q4H PRN Myriam Reid MD        enoxaparin (LOVENOX) injection 120 mg  1 mg/kg SubCUTAneous BID Myriam Reid MD   120 mg at 03/09/23 1048    [START ON 3/10/2023] propafenone (RYTHMOL SR) extended release capsule 225 mg  225 mg Oral 2 times per day Aysha Castellanos MD        dilTIAZem 125 mg in sodium chloride 0.9 % 125 mL infusion  2.5-15 mg/hr IntraVENous Continuous Aysha Castellanos MD        senna (SENOKOT) tablet 8.6 mg  1 tablet Oral BID Myriam Reid MD   8.6 mg at 03/08/23 2053    acetaminophen (TYLENOL) tablet 650 mg  650 mg Oral Q6H PRN Jorgito Adam MD        Or    acetaminophen (TYLENOL) suppository 650 mg  650 mg Rectal Q6H PRN Jorgito Adam MD        metoprolol tartrate (LOPRESSOR) tablet 25 mg  25 mg Oral BID CyrillKATHY Kelly - CNP   25 mg at 03/09/23 0845    pantoprazole (PROTONIX) injection 40 mg  40 mg IntraVENous Daily Nila Rodgers MD        sucralfate (CARAFATE) tablet 1 g  1 g Oral 3 times per day Nila Rodgers MD   1 g at 03/08/23 2053    insulin glargine (LANTUS;BASAGLAR) injection pen 10 Units  10 Units SubCUTAneous Nightly Myriam Reid MD   10 Units at 03/08/23 2106    lidocaine PF 1 % injection 5 mL  5 mL IntraDERmal Once Myriam Reid MD        sodium chloride flush 0.9 % injection 5-40 mL  5-40 mL IntraVENous 2 times per day Myriam Reid MD   10 mL at 03/09/23 0856    sodium chloride flush 0.9 % injection 5-40 mL  5-40 mL IntraVENous PRN Myriam Reid MD        0.9 % sodium chloride infusion  25 mL IntraVENous PRN Myriam Reid MD        sodium chloride flush 0.9 % injection 5-40 mL  5-40 mL IntraVENous 2 times per day Myriam Reid MD   10 mL at 03/07/23 2145    sodium chloride flush 0.9 % injection 5-40 mL  5-40 mL IntraVENous PRN Myriam Reid MD        0.9 % sodium chloride infusion   IntraVENous PRN Myriam Reid  mL/hr at 03/09/23 0110 25 mL at 03/09/23 0110    ondansetron (ZOFRAN-ODT) disintegrating tablet 4 mg  4 mg Oral Q8H PRN Ben Zavala MD        Or    ondansetron [facility]) injection 4 mg  4 mg IntraVENous Q6H PRN Ben Zavala MD        glucose chewable tablet 16 g  4 tablet Oral PRN Ben Zavala MD        dextrose bolus 10% 125 mL  125 mL IntraVENous PRN Ben Zavala MD        Or    dextrose bolus 10% 250 mL  250 mL IntraVENous PRN Ben Zavala MD        glucagon injection 1 mg  1 mg SubCUTAneous PRN Ben Zavala MD        dextrose 10 % infusion   IntraVENous Continuous PRN Ben Zavala MD        insulin lispro (1 Unit Dial) (HUMALOG/ADMELOG) pen 0-4 Units  0-4 Units SubCUTAneous TID WC Ben Zavala MD   1 Units at 03/08/23 1810    miconazole (MICOTIN) 2 % powder   Topical BID Ben Zavala MD   Given at 03/09/23 1045    insulin lispro (1 Unit Dial) (HUMALOG/ADMELOG) pen 0-4 Units  0-4 Units SubCUTAneous Nightly Ben Zavala MD   4 Units at 03/07/23 0244    sodium chloride flush 0.9 % injection 5-40 mL  5-40 mL IntraVENous 2 times per day Ben Zavala MD   10 mL at 03/08/23 2052    sodium chloride flush 0.9 % injection 5-40 mL  5-40 mL IntraVENous PRN Ben Zavala MD        0.9 % sodium chloride infusion  25 mL IntraVENous PRN Ben Zavala MD        meropenem [facility]) 1,000 mg in sodium chloride 0.9 % 100 mL IVPB (mini-bag)  1,000 mg IntraVENous Isaac Eden MD   Stopped at 03/09/23 1155    docusate sodium (COLACE) capsule 100 mg  100 mg Oral BID Reza Lynn MD   100 mg at 03/09/23 0846    polyethylene glycol (GLYCOLAX) packet 17 g  17 g Oral BID Reza Lynn MD   17 g at 03/08/23 2053       Allergies:     Allergies   Allergen Reactions    Theophyllines Hives    Apixaban      Foggy and fatigued    Metformin Other (See Comments)     Gets angry and mcgrath    Morphine Nausea And Vomiting       Problem List:    Patient Active Problem List   Diagnosis Code    Cellulitis, leg L03.119    Foot ulcer ([facility] [address] L97.509    Group B streptococcal infection A49.1    Enterococcus faecalis infection B95.2    Charcot foot due to diabetes mellitus ([facility] [address] E11.610    Diabetic foot infection ([facility]) E11.628, L08.9    Foot abscess L02.619    Diabetic infection of right foot ([facility]) E11.628, L08.9    Depression F32. A    Asthma J45.909    Diabetes mellitus type 2 in obese ([facility]) E11.69, E66.9    Toe ulcer ([facility] [address] L97.509    Cellulitis of foot L03.119    Cerebral infarction ([facility] [address] I63.9    Ataxia R27.0    Encounter for loop recorder check Z45.09    Palpitations R00.2    Diabetic ulcer of toe of right foot associated with type 2 diabetes mellitus, with fat layer exposed ([facility]) E11.621, L97.512    Abdominal pain R10.9    PAF (paroxysmal atrial fibrillation) ([facility]) I48.0    Transaminitis R74.01       Past Medical History:        Diagnosis Date    Arthritis     Asthma     exercise/cold induced    Blindness, legal     CVA (cerebral vascular accident) ([facility] [address] 2015    Depression     Diabetes mellitus ([address]     ION (ischemic optic neuropathy)     developed post-op r/t hypotension    Osteomyelitis ([facility] [address]     Left foot; finished [facility]. end of .  TIA (transient ischemic attack)        Past Surgical History:        Procedure Laterality Date    BACK SURGERY       SECTION      CHOLECYSTECTOMY      ELBOW SURGERY Left     FOOT SURGERY Left 2014    INCISION AND DRAINAGE MID FOOT LEFT, ENDOSCOPIC GASTROC    FOOT SURGERY Left 2014    LEFT FOOT EXTERNAL FIXATOR REMOVAL        HYSTERECTOMY (CERVIX STATUS UNKNOWN)      ORTHOPEDIC SURGERY      OTHER SURGICAL HISTORY Left 2014    INCISION AND DRAINAGE LEFT FOOT    SOCORRO AND BSO (CERVIX REMOVED)         Social History:    Social History     Tobacco Use    Smoking status: Never    Smokeless tobacco: Never   Substance Use Topics    Alcohol use:  No                                Counseling given: Not Answered      Vital Signs (Current):   Vitals:    23 1003 23 1007 23 1023 23 1144   BP: 113/75 106/80 112/74 116/74   Pulse: (!) 178 (!) 188 (!) 129 (!) 125   Resp:    18   Temp:    98.4 °F (36.9 °C)   TempSrc:    Oral   SpO2:    98%   Weight:                                                  BP Readings from Last 3 Encounters:   03/09/23 116/74   01/19/23 123/86   01/12/23 (!) 156/83       NPO Status:                                                                                 BMI:   Wt Readings from Last 3 Encounters:   03/09/23 257 lb 15 oz (117 kg)   01/05/23 245 lb 6 oz (111.3 kg)   12/06/22 246 lb 14.6 oz (112 kg)     Body mass index is 44.27 kg/m².     CBC:   Lab Results   Component Value Date/Time    WBC 6.0 03/09/2023 05:46 AM    RBC 4.61 03/09/2023 05:46 AM    HGB 12.7 03/09/2023 05:46 AM    HCT 40.5 03/09/2023 05:46 AM    MCV 87.8 03/09/2023 05:46 AM    RDW 14.3 03/09/2023 05:46 AM     03/09/2023 05:46 AM       CMP:   Lab Results   Component Value Date/Time     03/09/2023 05:46 AM    K 4.3 03/09/2023 05:46 AM    K 4.2 03/06/2023 07:00 PM     03/09/2023 05:46 AM    CO2 26 03/09/2023 05:46 AM    BUN 9 03/09/2023 05:46 AM    CREATININE 0.5 03/09/2023 05:46 AM    GFRAA >60 05/28/2015 05:27 AM    AGRATIO 1.0 04/17/2014 10:25 PM    LABGLOM >60 03/09/2023 05:46 AM    GLUCOSE 196 03/09/2023 05:46 AM    PROT 5.5 03/08/2023 04:00 AM    CALCIUM 8.6 03/09/2023 05:46 AM    BILITOT 0.4 03/08/2023 04:00 AM    ALKPHOS 150 03/08/2023 04:00 AM    AST 29 03/08/2023 04:00 AM    ALT 72 03/08/2023 04:00 AM       POC Tests:   Recent Labs     03/09/23  1203   POCGLU 168*       Coags:   Lab Results   Component Value Date/Time    PROTIME 10.0 05/23/2015 02:47 PM    INR 0.93 05/23/2015 02:47 PM    APTT 26.2 05/23/2015 02:47 PM       HCG (If Applicable):   Lab Results   Component Value Date    PREGTESTUR Negative 04/18/2014        ABGs: No results found for: PHART, PO2ART, XJL4FHT, IEH6UEY, BEART, S7MPIDXK     Type & Screen (If Applicable):  No results found for: LABABO, [address]    Drug/Infectious Status (If Applicable):  No results found for: HIV, HEPCAB    COVID-19 Screening (If Applicable):   Lab Results   Component Value Date/Time    COVID19 NOT DETECTED 03/06/2023 10:14 PM           Anesthesia Evaluation  Patient summary reviewed and Nursing notes reviewed  Airway: Mallampati: II  TM distance: >3 FB   Neck ROM: full  Mouth opening: > = 3 FB   Dental:          Pulmonary: breath sounds clear to auscultation  (+) asthma:                            Cardiovascular:  Exercise tolerance: good (>4 METS),           Rhythm: regular  Rate: normal                    Neuro/Psych:   (+) CVA:, neuromuscular disease:, TIA, psychiatric history:            GI/Hepatic/Renal:             Endo/Other:    (+) Diabetes, . Abdominal:   (+) obese,           Vascular: Other Findings:           Anesthesia Plan      MAC     ASA 3       Induction: intravenous. Anesthetic plan and risks discussed with patient. Use of blood products discussed with patient whom. Plan discussed with CRNA.                     Sandee Ashraf MD   3/9/2023

## 2023-03-09 NOTE — PLAN OF CARE
Problem: Discharge Planning  Goal: Discharge to home or other facility with appropriate resources  Outcome: Progressing  Flowsheets (Taken 3/9/2023 0135)  Discharge to home or other facility with appropriate resources:   Arrange for needed discharge resources and transportation as appropriate   Identify barriers to discharge with patient and caregiver   Identify discharge learning needs (meds, wound care, etc)   Arrange for interpreters to assist at discharge as needed   Refer to discharge planning if patient needs post-hospital services based on physician order or complex needs related to functional status, cognitive ability or social support system     Problem: Pain  Goal: Verbalizes/displays adequate comfort level or baseline comfort level  Outcome: Progressing  Flowsheets (Taken 3/9/2023 0135)  Verbalizes/displays adequate comfort level or baseline comfort level:   Encourage patient to monitor pain and request assistance   Assess pain using appropriate pain scale   Administer analgesics based on type and severity of pain and evaluate response   Implement non-pharmacological measures as appropriate and evaluate response   Consider cultural and social influences on pain and pain management   Notify Licensed Independent Practitioner if interventions unsuccessful or patient reports new pain     Problem: Skin/Tissue Integrity  Goal: Absence of new skin breakdown  Description: 1. Monitor for areas of redness and/or skin breakdown  2. Assess vascular access sites hourly  3. Every 4-6 hours minimum:  Change oxygen saturation probe site  4. Every 4-6 hours:  If on nasal continuous positive airway pressure, respiratory therapy assess nares and determine need for appliance change or resting period.   Outcome: Progressing     Problem: Safety - Adult  Goal: Free from fall injury  Outcome: Progressing  Flowsheets (Taken 3/9/2023 0135)  Free From Fall Injury: Instruct family/caregiver on patient safety     Problem: ABCDS Injury Assessment  Goal: Absence of physical injury  Outcome: Progressing  Flowsheets (Taken 3/9/2023 0135)  Absence of Physical Injury: Implement safety measures based on patient assessment     Problem: Cardiovascular - Adult  Goal: Maintains optimal cardiac output and hemodynamic stability  Outcome: Progressing  Flowsheets (Taken 3/9/2023 0135)  Maintains optimal cardiac output and hemodynamic stability:   Monitor blood pressure and heart rate   Monitor urine output and notify Licensed Independent Practitioner for values outside of normal range   Assess for signs of decreased cardiac output   Administer fluid and/or volume expanders as ordered   Administer vasoactive medications as ordered  Goal: Absence of cardiac dysrhythmias or at baseline  Outcome: Progressing  Flowsheets (Taken 3/9/2023 0135)  Absence of cardiac dysrhythmias or at baseline:   Monitor cardiac rate and rhythm   Assess for signs of decreased cardiac output   Administer antiarrhythmia medication and electrolyte replacement as ordered

## 2023-03-10 VITALS
TEMPERATURE: 98.2 F | HEIGHT: 64 IN | RESPIRATION RATE: 18 BRPM | OXYGEN SATURATION: 94 % | HEART RATE: 80 BPM | BODY MASS INDEX: 43.13 KG/M2 | DIASTOLIC BLOOD PRESSURE: 104 MMHG | SYSTOLIC BLOOD PRESSURE: 162 MMHG | WEIGHT: 252.65 LBS

## 2023-03-10 LAB
ALBUMIN SERPL-MCNC: 2.9 G/DL (ref 3.4–5)
ANION GAP SERPL CALCULATED.3IONS-SCNC: 8 MMOL/L (ref 3–16)
ANTI-NUCLEAR ANTIBODY (ANA): NEGATIVE
BASOPHILS ABSOLUTE: 0.2 K/UL (ref 0–0.2)
BASOPHILS RELATIVE PERCENT: 3 %
BUN BLDV-MCNC: 11 MG/DL (ref 7–20)
CALCIUM SERPL-MCNC: 8.4 MG/DL (ref 8.3–10.6)
CHLORIDE BLD-SCNC: 104 MMOL/L (ref 99–110)
CO2: 28 MMOL/L (ref 21–32)
CREAT SERPL-MCNC: 0.5 MG/DL (ref 0.6–1.2)
EKG ATRIAL RATE: 74 BPM
EKG DIAGNOSIS: NORMAL
EKG P AXIS: 64 DEGREES
EKG P-R INTERVAL: 194 MS
EKG Q-T INTERVAL: 402 MS
EKG QRS DURATION: 104 MS
EKG QTC CALCULATION (BAZETT): 446 MS
EKG R AXIS: -65 DEGREES
EKG T AXIS: 48 DEGREES
EKG VENTRICULAR RATE: 74 BPM
EOSINOPHILS ABSOLUTE: 0.5 K/UL (ref 0–0.6)
EOSINOPHILS RELATIVE PERCENT: 7 %
GFR SERPL CREATININE-BSD FRML MDRD: >60 ML/MIN/{1.73_M2}
GLUCOSE BLD-MCNC: 138 MG/DL (ref 70–99)
GLUCOSE BLD-MCNC: 162 MG/DL (ref 70–99)
GLUCOSE BLD-MCNC: 168 MG/DL (ref 70–99)
HCT VFR BLD CALC: 36.3 % (ref 36–48)
HEMOGLOBIN: 12.5 G/DL (ref 12–16)
LYMPHOCYTES ABSOLUTE: 1.4 K/UL (ref 1–5.1)
LYMPHOCYTES RELATIVE PERCENT: 20 %
MAGNESIUM: 1.9 MG/DL (ref 1.8–2.4)
MCH RBC QN AUTO: 28.7 PG (ref 26–34)
MCHC RBC AUTO-ENTMCNC: 34.3 G/DL (ref 31–36)
MCV RBC AUTO: 83.7 FL (ref 80–100)
MONOCYTES ABSOLUTE: 0.4 K/UL (ref 0–1.3)
MONOCYTES RELATIVE PERCENT: 6 %
NEUTROPHILS ABSOLUTE: 4.4 K/UL (ref 1.7–7.7)
NEUTROPHILS RELATIVE PERCENT: 64 %
PDW BLD-RTO: 13.7 % (ref 12.4–15.4)
PERFORMED ON: ABNORMAL
PERFORMED ON: ABNORMAL
PHOSPHORUS: 3.4 MG/DL (ref 2.5–4.9)
PLATELET # BLD: 248 K/UL (ref 135–450)
PMV BLD AUTO: 8.6 FL (ref 5–10.5)
POTASSIUM SERPL-SCNC: 4.6 MMOL/L (ref 3.5–5.1)
RBC # BLD: 4.34 M/UL (ref 4–5.2)
RBC # BLD: NORMAL 10*6/UL
SODIUM BLD-SCNC: 140 MMOL/L (ref 136–145)
WBC # BLD: 6.8 K/UL (ref 4–11)

## 2023-03-10 PROCEDURE — 97162 PT EVAL MOD COMPLEX 30 MIN: CPT

## 2023-03-10 PROCEDURE — 6370000000 HC RX 637 (ALT 250 FOR IP): Performed by: INTERNAL MEDICINE

## 2023-03-10 PROCEDURE — 97116 GAIT TRAINING THERAPY: CPT

## 2023-03-10 PROCEDURE — 93010 ELECTROCARDIOGRAM REPORT: CPT | Performed by: INTERNAL MEDICINE

## 2023-03-10 PROCEDURE — 6360000002 HC RX W HCPCS: Performed by: INTERNAL MEDICINE

## 2023-03-10 PROCEDURE — 97535 SELF CARE MNGMENT TRAINING: CPT

## 2023-03-10 PROCEDURE — 97166 OT EVAL MOD COMPLEX 45 MIN: CPT

## 2023-03-10 PROCEDURE — 99232 SBSQ HOSP IP/OBS MODERATE 35: CPT | Performed by: INTERNAL MEDICINE

## 2023-03-10 PROCEDURE — 80069 RENAL FUNCTION PANEL: CPT

## 2023-03-10 PROCEDURE — 85025 COMPLETE CBC W/AUTO DIFF WBC: CPT

## 2023-03-10 PROCEDURE — 36415 COLL VENOUS BLD VENIPUNCTURE: CPT

## 2023-03-10 PROCEDURE — 97530 THERAPEUTIC ACTIVITIES: CPT

## 2023-03-10 PROCEDURE — 6360000002 HC RX W HCPCS: Performed by: STUDENT IN AN ORGANIZED HEALTH CARE EDUCATION/TRAINING PROGRAM

## 2023-03-10 PROCEDURE — 2580000003 HC RX 258: Performed by: INTERNAL MEDICINE

## 2023-03-10 PROCEDURE — 83735 ASSAY OF MAGNESIUM: CPT

## 2023-03-10 RX ORDER — FLUCONAZOLE 100 MG/1
100 TABLET ORAL DAILY
Qty: 19 TABLET | Refills: 0 | Status: SHIPPED | OUTPATIENT
Start: 2023-03-10 | End: 2023-03-29

## 2023-03-10 RX ORDER — INSULIN GLARGINE-YFGN 100 [IU]/ML
10 INJECTION, SOLUTION SUBCUTANEOUS NIGHTLY
Qty: 3 ML | Refills: 5 | Status: SHIPPED | OUTPATIENT
Start: 2023-03-10 | End: 2023-03-10 | Stop reason: SDUPTHER

## 2023-03-10 RX ORDER — POLYETHYLENE GLYCOL 3350 17 G/17G
17 POWDER, FOR SOLUTION ORAL 2 TIMES DAILY
Qty: 527 G | Refills: 1 | Status: SHIPPED | OUTPATIENT
Start: 2023-03-10 | End: 2023-03-10 | Stop reason: SDUPTHER

## 2023-03-10 RX ORDER — PSEUDOEPHEDRINE HCL 30 MG
100 TABLET ORAL 2 TIMES DAILY PRN
Qty: 60 CAPSULE | Refills: 0 | Status: SHIPPED | OUTPATIENT
Start: 2023-03-10 | End: 2023-04-09

## 2023-03-10 RX ORDER — RIVAROXABAN 15 MG-20MG
KIT ORAL
Qty: 1 EACH | Refills: 5 | Status: SHIPPED | OUTPATIENT
Start: 2023-03-10 | End: 2023-03-10 | Stop reason: SDUPTHER

## 2023-03-10 RX ORDER — PANTOPRAZOLE SODIUM 40 MG/1
40 TABLET, DELAYED RELEASE ORAL
Qty: 30 TABLET | Refills: 3 | Status: SHIPPED | OUTPATIENT
Start: 2023-03-10

## 2023-03-10 RX ORDER — PANTOPRAZOLE SODIUM 40 MG/1
40 TABLET, DELAYED RELEASE ORAL
Qty: 30 TABLET | Refills: 3 | Status: SHIPPED | OUTPATIENT
Start: 2023-03-10 | End: 2023-03-10 | Stop reason: SDUPTHER

## 2023-03-10 RX ORDER — GLIPIZIDE 5 MG/1
5 TABLET ORAL 2 TIMES DAILY
Qty: 60 TABLET | Refills: 5 | Status: SHIPPED | OUTPATIENT
Start: 2023-03-10

## 2023-03-10 RX ORDER — POLYETHYLENE GLYCOL 3350 17 G/17G
17 POWDER, FOR SOLUTION ORAL 2 TIMES DAILY
Qty: 527 G | Refills: 1 | Status: SHIPPED | OUTPATIENT
Start: 2023-03-10 | End: 2023-04-09

## 2023-03-10 RX ORDER — FLUCONAZOLE 100 MG/1
100 TABLET ORAL DAILY
Qty: 19 TABLET | Refills: 0 | Status: SHIPPED | OUTPATIENT
Start: 2023-03-10 | End: 2023-03-10 | Stop reason: SDUPTHER

## 2023-03-10 RX ORDER — PROPAFENONE HYDROCHLORIDE 225 MG/1
225 CAPSULE, EXTENDED RELEASE ORAL EVERY 12 HOURS SCHEDULED
Qty: 60 CAPSULE | Refills: 3 | Status: SHIPPED | OUTPATIENT
Start: 2023-03-10

## 2023-03-10 RX ORDER — SUCRALFATE 1 G/1
1 TABLET ORAL EVERY 8 HOURS SCHEDULED
Qty: 120 TABLET | Refills: 3 | Status: SHIPPED | OUTPATIENT
Start: 2023-03-10

## 2023-03-10 RX ORDER — GLIPIZIDE 5 MG/1
5 TABLET ORAL 2 TIMES DAILY
Qty: 60 TABLET | Refills: 5 | Status: SHIPPED | OUTPATIENT
Start: 2023-03-10 | End: 2023-03-10 | Stop reason: SDUPTHER

## 2023-03-10 RX ORDER — SENNA PLUS 8.6 MG/1
1 TABLET ORAL 2 TIMES DAILY
Qty: 60 TABLET | Refills: 0 | Status: SHIPPED | OUTPATIENT
Start: 2023-03-10 | End: 2023-03-10 | Stop reason: SDUPTHER

## 2023-03-10 RX ORDER — PSEUDOEPHEDRINE HCL 30 MG
100 TABLET ORAL 2 TIMES DAILY PRN
Qty: 60 CAPSULE | Refills: 0 | Status: SHIPPED | OUTPATIENT
Start: 2023-03-10 | End: 2023-03-10 | Stop reason: SDUPTHER

## 2023-03-10 RX ORDER — SUCRALFATE 1 G/1
1 TABLET ORAL EVERY 8 HOURS SCHEDULED
Qty: 120 TABLET | Refills: 3 | Status: SHIPPED | OUTPATIENT
Start: 2023-03-10 | End: 2023-03-10 | Stop reason: SDUPTHER

## 2023-03-10 RX ORDER — RIVAROXABAN 15 MG-20MG
KIT ORAL
Qty: 1 EACH | Refills: 5 | Status: SHIPPED | OUTPATIENT
Start: 2023-03-10 | End: 2023-03-10 | Stop reason: HOSPADM

## 2023-03-10 RX ORDER — INSULIN GLARGINE-YFGN 100 [IU]/ML
10 INJECTION, SOLUTION SUBCUTANEOUS NIGHTLY
Qty: 10 ML | Refills: 5 | Status: SHIPPED | OUTPATIENT
Start: 2023-03-10 | End: 2023-04-09

## 2023-03-10 RX ORDER — SENNA PLUS 8.6 MG/1
1 TABLET ORAL 2 TIMES DAILY
Qty: 60 TABLET | Refills: 0 | Status: SHIPPED | OUTPATIENT
Start: 2023-03-10 | End: 2023-04-09

## 2023-03-10 RX ORDER — PROPAFENONE HYDROCHLORIDE 225 MG/1
225 CAPSULE, EXTENDED RELEASE ORAL EVERY 12 HOURS SCHEDULED
Qty: 60 CAPSULE | Refills: 3 | Status: SHIPPED | OUTPATIENT
Start: 2023-03-10 | End: 2023-03-10 | Stop reason: SDUPTHER

## 2023-03-10 RX ADMIN — ENOXAPARIN SODIUM 120 MG: 150 INJECTION SUBCUTANEOUS at 09:55

## 2023-03-10 RX ADMIN — MEROPENEM 1000 MG: 1 INJECTION INTRAVENOUS at 10:54

## 2023-03-10 RX ADMIN — PROPAFENONE HYDROCHLORIDE 225 MG: 225 CAPSULE, EXTENDED RELEASE ORAL at 09:56

## 2023-03-10 RX ADMIN — METOPROLOL TARTRATE 25 MG: 25 TABLET, FILM COATED ORAL at 09:55

## 2023-03-10 RX ADMIN — ONDANSETRON 4 MG: 2 INJECTION INTRAMUSCULAR; INTRAVENOUS at 02:33

## 2023-03-10 RX ADMIN — HYDROMORPHONE HYDROCHLORIDE 0.25 MG: 1 INJECTION, SOLUTION INTRAMUSCULAR; INTRAVENOUS; SUBCUTANEOUS at 02:20

## 2023-03-10 RX ADMIN — MEROPENEM 1000 MG: 1 INJECTION INTRAVENOUS at 02:07

## 2023-03-10 RX ADMIN — SODIUM CHLORIDE, PRESERVATIVE FREE 10 ML: 5 INJECTION INTRAVENOUS at 10:55

## 2023-03-10 RX ADMIN — FLUCONAZOLE 100 MG: 200 TABLET ORAL at 09:53

## 2023-03-10 RX ADMIN — PANTOPRAZOLE SODIUM 40 MG: 40 TABLET, DELAYED RELEASE ORAL at 05:19

## 2023-03-10 RX ADMIN — DOCUSATE SODIUM 100 MG: 100 CAPSULE, LIQUID FILLED ORAL at 09:55

## 2023-03-10 RX ADMIN — SUCRALFATE 1 G: 1 TABLET ORAL at 05:19

## 2023-03-10 RX ADMIN — HYDROMORPHONE HYDROCHLORIDE 0.25 MG: 1 INJECTION, SOLUTION INTRAMUSCULAR; INTRAVENOUS; SUBCUTANEOUS at 05:18

## 2023-03-10 RX ADMIN — MICONAZOLE NITRATE: 20 POWDER TOPICAL at 10:55

## 2023-03-10 RX ADMIN — SUCRALFATE 1 G: 1 TABLET ORAL at 13:40

## 2023-03-10 ASSESSMENT — PAIN DESCRIPTION - ORIENTATION
ORIENTATION: RIGHT

## 2023-03-10 ASSESSMENT — PAIN DESCRIPTION - DESCRIPTORS
DESCRIPTORS: ACHING
DESCRIPTORS: ACHING;STABBING
DESCRIPTORS: ACHING;STABBING

## 2023-03-10 ASSESSMENT — PAIN DESCRIPTION - FREQUENCY
FREQUENCY: CONTINUOUS
FREQUENCY: CONTINUOUS

## 2023-03-10 ASSESSMENT — PAIN - FUNCTIONAL ASSESSMENT
PAIN_FUNCTIONAL_ASSESSMENT: PREVENTS OR INTERFERES SOME ACTIVE ACTIVITIES AND ADLS
PAIN_FUNCTIONAL_ASSESSMENT: PREVENTS OR INTERFERES SOME ACTIVE ACTIVITIES AND ADLS

## 2023-03-10 ASSESSMENT — PAIN DESCRIPTION - PAIN TYPE
TYPE: ACUTE PAIN

## 2023-03-10 ASSESSMENT — PAIN SCALES - GENERAL
PAINLEVEL_OUTOF10: 10
PAINLEVEL_OUTOF10: 9
PAINLEVEL_OUTOF10: 7
PAINLEVEL_OUTOF10: 6

## 2023-03-10 ASSESSMENT — PAIN DESCRIPTION - LOCATION
LOCATION: ABDOMEN

## 2023-03-10 ASSESSMENT — PAIN DESCRIPTION - ONSET
ONSET: ON-GOING
ONSET: ON-GOING

## 2023-03-10 ASSESSMENT — PAIN DESCRIPTION - DIRECTION: RADIATING_TOWARDS: N

## 2023-03-10 NOTE — PROGRESS NOTES
Reviewed discharge instructions with patient and Daniela Rodriguez. Signature was obtained form Daniela Rodriguez, Mckenzie Jackman also signed, however she is visually impaired. Informed them of med to beds being ready to be picked up. Patient stated that she is not going to take the Xarelto or the diabetes medication and instructed Grazyna Penny not to purchase medications. medications were purchased from med to bed and given to patient. All personal items are in pt's possession. Uneventfully removed IV, bandage to site. Will discharge home when wheelchair is available.

## 2023-03-10 NOTE — SIGNIFICANT EVENT
Writer notified that pt was having abdominal pain. Writer went to evaluate the pt, pt stated, \"I can't do this tight now, I just want to go to sleep. \" Writer asked if pt could examine her abdomen because she had a procedure yesterday, pt responded \"ok. \" When Daniel Lees went to the wall to get latex gloves, pt raised her voice and yelled at Avanell Breeze, Faridamiguel angel Mayoer are you doing, I don't want to be bothered. \" Diallo Buckner, stated that I though the pt's response of \"ok\" to his question was agreement for physical examination of abdominal pain. Writer and pt acknowledge it was a misunderstanding. Writer told pt he would follow up with her nurse. Extra dose of pain medication ordered.     Jean Marie Regan DO, PGY-2

## 2023-03-10 NOTE — DISCHARGE INSTR - COC
Continuity of Care Form    Patient Name: Mckenzie Jackman   :  1960  MRN:  [id number]    Admit date:  3/6/2023  Discharge date:  ***    Code Status Order: Full Code   Advance Directives:   Advance Care Flowsheet Documentation       Date/Time Healthcare Directive Type of Healthcare Directive Copy in [address] Agent's Name Healthcare Agent's Phone Number    [phone] No, patient does not have an advance directive for healthcare treatment -- -- -- -- --            Admitting Physician:  Karoline Galvan MD  PCP: Carmen Staley    Discharging Nurse: [facility] Unit/Room#: 6442/7333-30  Discharging Unit Phone Number: ***    Emergency Contact:   Extended Emergency Contact Information  Primary Emergency Contact: Wu Gutierrez  Address: [address] [place] [address], [address] [place] [address] Phone: [phone]  Mobile Phone: [phone]  Relation: Spouse  Secondary Emergency Contact: None,Per Pt  Relation: Unknown    Past Surgical History:  Past Surgical History:   Procedure Laterality Date    BACK SURGERY       SECTION      CHOLECYSTECTOMY      ELBOW SURGERY Left     FOOT SURGERY Left 2014    INCISION AND DRAINAGE MID FOOT LEFT, ENDOSCOPIC GASTROC    FOOT SURGERY Left 2014    LEFT FOOT EXTERNAL FIXATOR REMOVAL        HYSTERECTOMY (CERVIX STATUS UNKNOWN)      ORTHOPEDIC SURGERY      OTHER SURGICAL HISTORY Left 2014    INCISION AND DRAINAGE LEFT FOOT    SOCORRO AND BSO (CERVIX REMOVED)      UPPER GASTROINTESTINAL ENDOSCOPY N/A 3/9/2023    EGD DIAGNOSTIC ONLY performed by Alessia Hoffman MD at [facility] ENDOSCOPY       Immunization History:   Immunization History   Administered Date(s) Administered    COVID-19, PFIZER PURPLE top, DILUTE for use, (age 15 y+), 30mcg/0.3mL 2021, 2021       Active Problems:  Patient Active Problem List   Diagnosis Code    Cellulitis, leg L03.119    Foot ulcer ([facility] [address] L97.509 Group B streptococcal infection A49.1    Enterococcus faecalis infection B95.2    Charcot foot due to diabetes mellitus ([facility] [address] E11.610    Diabetic foot infection ([facility]) E11.628, L08.9    Foot abscess L02.619    Diabetic infection of right foot ([facility]) E11.628, L08.9    Depression F32. A    Asthma J45.909    Diabetes mellitus type 2 in obese ([facility]) E11.69, E66.9    Toe ulcer ([facility] [address] L97.509    Cellulitis of foot L03.119    Cerebral infarction ([facility] [address] I63.9    Ataxia R27.0    Encounter for loop recorder check Z45.09    Palpitations R00.2    Diabetic ulcer of toe of right foot associated with type 2 diabetes mellitus, with fat layer exposed [facility] E11.621, L97.512    Abdominal pain R10.9    PAF (paroxysmal atrial fibrillation) ([facility]) I48.0    Transaminitis R74.01       Isolation/Infection:   Isolation            No Isolation          Patient Infection Status       Infection Onset Added Last Indicated Last Indicated By Review Planned Expiration Resolved Resolved By    None active    Resolved    COVID-19 (Rule Out) 03/06/23 03/06/23 03/06/23 COVID-19 & Influenza Combo (Ordered)   03/06/23 Rule-Out Test Resulted            Nurse Assessment:  Last Vital Signs: BP (!) 162/104 Comment: patient was agitated  Pulse 80   Temp 98.2 °F (36.8 °C) (Oral)   Resp 18   Ht 5' 4\" (1.626 m)   Wt 252 lb 10.4 oz (114.6 kg)   SpO2 94%   BMI 43.37 kg/m²     Last documented pain score (0-10 scale): Pain Level: 7  Last Weight:   Wt Readings from Last 1 Encounters:   03/10/23 252 lb 10.4 oz (114.6 kg)     Mental Status:  {IP PT MENTAL STATUS:33795}    IV Access:  {[facility] IV ACCESS:231709946}    Nursing Mobility/ADLs:  Walking   {P DME CGSR:798578466}  Transfer  {[facility] DME AEWL:871866518}  Bathing  {[facility] DME BGAA:606230053}  Dressing  {P DME VOVF:695170633}  Toileting  {[facility] DME XZNZ:541373954}  Feeding  {[facility] YSJK:024713795}  Med Admin  {[facility] EPYE:988345381}  Med Delivery   {[facility] MED Delivery:861187758}    Wound Care Documentation and Therapy:        Elimination:  Continence: Bowel: {YES / IE:48079}  Bladder: {YES / KZ:28052}  Urinary Catheter: {Urinary Catheter:220191753}   Colostomy/Ileostomy/Ileal Conduit: {YES / FO:96214}       Date of Last BM: ***    Intake/Output Summary (Last 24 hours) at 3/10/2023 1413  Last data filed at 3/10/2023 0720  Gross per 24 hour   Intake 273.55 ml   Output 675 ml   Net -401.45 ml     I/O last 3 completed shifts: In: 513.6 [P.O.:240;  I.V.:73.6; IV Piggyback:200]  Out: 2150 [Urine:2150]    Safety Concerns:     [address] Safety Concerns:375697852}    Impairments/Disabilities:      [address] Impairments/Disabilities:535448331}    Nutrition Therapy:  Current Nutrition Therapy:   [address] Diet List:680914726}    Routes of Feeding: {CHP DME Other Feedings:829941094}  Liquids: {Slp liquid thickness:49755}  Daily Fluid Restriction: {CHP DME Yes amt example:547621468}  Last Modified Barium Swallow with Video (Video Swallowing Test): {Done Not Done FDUF:497063255}    Treatments at the Time of Hospital Discharge:   Respiratory Treatments: ***  Oxygen Therapy:  {Therapy; copd oxygen:68000}  Ventilator:    { CC Vent FZIU:986485463}    Rehab Therapies: {THERAPEUTIC INTERVENTION:6322897989}  Weight Bearing Status/Restrictions: [address]  Weight Bearin}  Other Medical Equipment (for information only, NOT a DME order):  {EQUIPMENT:878529557}  Other Treatments: ***    Patient's personal belongings (please select all that are sent with patient):  {[facility] DME Belongings:379101699}    RN SIGNATURE:  {Esignature:298777158}    CASE MANAGEMENT/SOCIAL WORK SECTION    Inpatient Status Date: ***    Readmission Risk Assessment Score:  Readmission Risk              Risk of Unplanned Readmission:  14           Discharging to Facility/ Agency   Name:   Address:  Phone:  Fax:    Dialysis Facility (if applicable)   Name:  Address:  Dialysis Schedule:  Phone:  Fax:    / signature: {Esignature:889279267}    PHYSICIAN SECTION    Prognosis: {Prognosis:1338929609}    Condition at Discharge: Americo Santoyo Patient Condition:741632047}    Rehab Potential (if transferring to Rehab): {Prognosis:2087515400}    Recommended Labs or Other Treatments After Discharge: ***    Physician Certification: I certify the above information and transfer of Junito Alfonso  is necessary for the continuing treatment of the diagnosis listed and that she requires {Admit to Appropriate Level of Care:92260} for {GREATER/LESS:877971635} 30 days.      Update Admission H&P: {CHP DME Changes in HBGF}    PHYSICIAN SIGNATURE:  {Esignature:583855642}

## 2023-03-10 NOTE — DISCHARGE INSTRUCTIONS
-Please follow-up with your PCP, cardiology and gastroenterology within 1 week  -Please take metoprolol 25mg twice daily to control your heart rate.   -Please START taking Propafenone and rest of the medications as prescribed to you. -Please take fluconazole 100mg daily until March 29th for fungal infection in your gut.   -Please take Protonix 40mg daily  -You have been prescribed Xarelto which is a anticoagulant ( blood thinner ) for your Atrial Fibrillation ( irregular heart rate and rhythm). -You can take Glycolax, Senokot, or Colace as needed for constipation    It has been discussed that the medications for your atrial fibrillation ( irregular heart rhythm )which include Lopressor, Propafenone and Xarelto should be taken as prescribed, failure to adhere to the medical therapy might result in recurrence of a stroke. Your diabetes is uncontrolled, thus START taking Lantus and Glipizide as prescribed and follow up with your PCP.

## 2023-03-10 NOTE — PROGRESS NOTES
Patient seen and examined she is doing well abdominal pain has resolved; patient doing well abdomen is soft, she is adamant about not being on medications for diabetes; she does not want to see ophthalmology either. My concern is that she is at high risk for worsening arteriopathy and high risk for further complications from her diabetes. Risks about not taking medications discussed at length patient understands and communicates her understanding. Asked to monitor for recurrence of symptoms or new symptoms including but not limited to chest pain shortness of breath nausea vomiting fever sor chills and seek immediate medical attention or call 911. Greater than 30 mins spent on case on day of discharge.

## 2023-03-10 NOTE — PROGRESS NOTES
Physical Therapy  Facility/Department: [place] [address] [facility]  Physical Therapy Initial Assessment and treatment    Name: Darlene Mock  : 1960  MRN: [id number]  Date of Service: 3/10/2023    Discharge Recommendations:    Darlene Mock scored a 18/24 on the AM-PAC short mobility form. Current research shows that an AM-PAC score of 18 or greater is typically associated with a discharge to the patient's home setting. Based on the patient's AM-PAC score and their current functional mobility deficits, it is recommended that the patient have 2-3 sessions per week of Physical Therapy at d/c to increase the patient's independence. At this time, this patient demonstrates the endurance and safety to discharge home with home PT and a follow up treatment frequency of 2-3x/wk. Please see assessment section for further patient specific details. PT Equipment Recommendations  Equipment Needed: No      Patient Diagnosis(es): The primary encounter diagnosis was Abdominal pain, unspecified abdominal location. A diagnosis of Transaminitis was also pertinent to this visit. Past Medical History:  has a past medical history of Arthritis, Asthma, Blindness, legal, CVA (cerebral vascular accident) ([address] Depression, Diabetes mellitus ([address] ION (ischemic optic neuropathy), Osteomyelitis ([address] and TIA (transient ischemic attack). Past Surgical History:  has a past surgical history that includes  section; Cholecystectomy; orthopedic surgery; back surgery; Hysterectomy; other surgical history (Left, 2014); Foot surgery (Left, 2014); Foot surgery (Left, 2014); Total abdominal hysterectomy w/ bilateral salpingoophorectomy; Elbow surgery (Left); and Upper gastrointestinal endoscopy (N/A, 3/9/2023). Assessment   Body Structures, Functions, Activity Limitations Requiring Skilled Therapeutic Intervention: Decreased functional mobility ; Decreased endurance;Decreased balance  Assessment: Patient tolerated session well, transferring and ambulating in room and bathroom as above with fairly steady gait with use of FWW and AFO on RLE due to previous deficits from CVA. Patient requires increased time to complete tasks as movements are effortful with rest breaks needed throughout session. Patient tangential at times with conversation needing verbal cues for re-direction; she attributes deficits from new medications. Patient is typically ambulatory with FWW at home with modified independence but has the option to utilize electric scooter with ramp entrance into the house if needed. Patient is currently functioning below baseline level. Recommend skilled PT upon discharge. Will follow while in acute care setting to maximize independence with mobility. Patient planning to d/c home with spouse. If 24 hour supervision/assist is not available, recommend use of electric scooter for mobility as opposed to [address]. Treatment Diagnosis: impaired mobility associated with abdominal pain.   Therapy Prognosis: Good  Decision Making: Medium Complexity  Requires PT Follow-Up: Yes  Activity Tolerance  Activity Tolerance: Patient limited by fatigue;Patient tolerated treatment well;Patient limited by endurance     Plan   Physcial Therapy Plan  General Plan:  (2-5)  Current Treatment Recommendations: Strengthening, Balance training, Gait training, Functional mobility training, Transfer training, Endurance training, Patient/Caregiver education & training, Safety education & training, Therapeutic activities  Safety Devices  Type of Devices: Left in chair, Call light within reach, Chair alarm in place, Gait belt, Nurse notified     Restrictions  Position Activity Restriction  Other position/activity restrictions: ambulate pt     Subjective   Pain: patient reporting abdominal pain comes and goes but not reported during session  General  Chart Reviewed: Yes  Patient assessed for rehabilitation services?: Yes  Additional Pertinent Hx: Pt admitted to ED with c/o abdominal pain x 4 days. Diagnostic EGD on 3/9. Started on ABX. PMHx includes:  arthritis, asthma, legal blindness, CVA, depression, DM, OM, TIA. Response To Previous Treatment: Not applicable  Family / Caregiver Present: Yes (significant other)  Referring Practitioner: Susan Wiley MD  Referral Date : 03/10/23  Diagnosis: abdominal pain  Follows Commands: Within Functional Limits  General Comment  Comments: Patient supine in bed upon arrival.  Subjective  Subjective: Patient agreeable to PT evaluation. Social/Functional History  Social/Functional History  Lives With: Significant other  Type of Home: House  Home Layout: Able to Live on Main level with bedroom/bathroom, One level  Home Access: Ramped entrance, Stairs to enter with rails (ramp in back of home, stairs in front of home- usually gets in/ out of house with stairs)  Entrance Stairs - Number of Steps: 5+7  Entrance Stairs - Rails: Left  Bathroom Shower/Tub: Walk-in shower  Bathroom Toilet: Standard (sink nearby for leverage)  Bathroom Equipment: Grab bars in shower, Shower chair, Hand-held shower  Home Equipment: Electric scooter, Walker, rolling, Reacher  Has the patient had two or more falls in the past year or any fall with injury in the past year?: No  Receives Help From: Family  ADL Assistance:  ( assists with donning bra and setting up clothes.   nearby when bathing)  Homemaking Assistance:  (microwave cooking)  Ambulation Assistance: Independent (with RW)  Transfer Assistance: Independent  Active : No  Patient's  Info:   Additional Comments:  works m-f but  Vision/Hearing  Vision  Vision: Impaired  Vision Exceptions: Wears glasses for reading (has had multiple eye surgeries)  Hearing  Hearing: Within functional limits    Cognition   Orientation  Overall Orientation Status: Within Functional Limits  Cognition  Overall Cognitive Status: WFL  Cognition Comment: pt somewhat tangental at times during conversation- reporting 2/2 medications.      Objective           AROM RLE (degrees)  RLE AROM: Exceptions  RLE General AROM: limited dorsiflexion; otherwise WFL  AROM LLE (degrees)  LLE AROM : WFL  Strength RLE  Strength RLE: Exception  Comment: grossly weakened due to history of CVA with right deficits, right foot drop  Strength LLE  Strength LLE: WFL           Bed mobility  Supine to Sit: Contact guard assistance (head of bed elevated, increased time to complete task as movement is effortful)  Scooting: Contact guard assistance (to edge of bed)  Bed Mobility Comments: patient sitting up in bedside chair at end of session  Transfers  Sit to Stand: Contact guard assistance (from EOB, from toilet, verbal cues for UE placement)  Stand to Sit: Contact guard assistance (to toilet and to bedside chair, verbal cues for safety and reach back)  Ambulation  Surface: Level tile  Device: Rolling Walker  Other Apparatus: AFO (right AFO)  Assistance: Contact guard assistance  Quality of Gait: step to pattern leading with RLE, gait steady with no overt loss of balance noted, decreased ebenezer, decreased step length bilaterally, movement effortful with quick fatigue  Distance: 8' into bathroom, 20' in room returning to bedside chair  Comments: patient declining further ambulation due to fatigue  More Ambulation?: No  Stairs/Curb  Stairs?: No     Balance  Sitting - Static: Fair;+  Standing - Static: Fair (CGA with UE support)  Standing - Dynamic: Fair (CGA to ambulate with FWW)  Comments: AFO donned at EOB with assist; patient set up assist for grooming at bedside chair after reporting increased fatigue standing at sink to wash hands           OutComes Score                                                  AM-PAC Score  AM-PAC Inpatient Mobility Raw Score : 18 (03/10/23 1427)  AM-PAC Inpatient T-Scale Score : 43.63 (03/10/23 1427)  Mobility Inpatient CMS 0-100% Score: 46.58 (03/10/23 1427)  Mobility Inpatient CMS G-Code Modifier : CK (03/10/23 1427)          Tinneti Score       Goals  Short Term Goals  Time Frame for Short Term Goals: discharge  Short Term Goal 1: patient will perform bed mobility with supervision  Short Term Goal 2: patient will perform transfers sit<>stand with supervision  Short Term Goal 3: patient will ambulate 76' with FWW and supervision  Patient Goals   Patient Goals : to go home       Education  Patient Education  Education Given To: Patient; Family  Education Provided: Role of Therapy;Plan of Care;Transfer Training; Fall Prevention Strategies  Education Method: Verbal  Barriers to Learning: None  Education Outcome: Verbalized understanding      Therapy Time   Individual Concurrent Group Co-treatment   Time In 1045         Time Out 1153         Minutes 68         Timed Code Treatment Minutes: 53 Minutes   Timed Code Treatment Minutes:  53 Minutes    Total Treatment Minutes:    53 minutes treatment + 15 minutes evaluation = 68 total treatment minutes  If patient discharges prior to next session this note will serve as a discharge summary. Please see below for the latest assessment towards goals.      [address]

## 2023-03-10 NOTE — PROGRESS NOTES
Occupational Therapy  Facility/Department: [place] [address] [facility]  Occupational Therapy Initial Assessment/ Treatment    Name: Benjy March  : 1960  MRN: [id number]  Date of Service: 3/10/2023    Discharge Recommendations:     OT Equipment Recommendations  Equipment Needed: No   Benjy March scored a 18/24 on the AM-PAC ADL Inpatient form. Current research shows that an AM-PAC score of 18 or greater is typically associated with a discharge to the patient's home setting. Based on the patient's AM-PAC score, and their current ADL deficits, it is recommended that the patient have 2-3 sessions per week of Occupational Therapy at d/c to increase the patient's independence. At this time, this patient demonstrates the endurance and safety to discharge home with home vs OP services and a follow up treatment frequency of 2-3x/wk. Please see assessment section for further patient specific details. If patient discharges prior to next session this note will serve as a discharge summary. Please see below for the latest assessment towards goals. Patient Diagnosis(es): The primary encounter diagnosis was Abdominal pain, unspecified abdominal location. A diagnosis of Transaminitis was also pertinent to this visit. Past Medical History:  has a past medical history of Arthritis, Asthma, Blindness, legal, CVA (cerebral vascular accident) ([address] Depression, Diabetes mellitus ([address] ION (ischemic optic neuropathy), Osteomyelitis ([address] and TIA (transient ischemic attack). Past Surgical History:  has a past surgical history that includes  section; Cholecystectomy; orthopedic surgery; back surgery; Hysterectomy; other surgical history (Left, 2014); Foot surgery (Left, 2014); Foot surgery (Left, 2014); Total abdominal hysterectomy w/ bilateral salpingoophorectomy; Elbow surgery (Left); and Upper gastrointestinal endoscopy (N/A, 3/9/2023).     Treatment Diagnosis: decreased ADLs and transfers      Assessment   Performance deficits / Impairments: Decreased functional mobility ; Decreased endurance;Decreased coordination;Decreased high-level IADLs  Assessment: Prior to admission pt was living at home with her  assisting PRN. Pt now presents slightly below her baseline, demonstrating CGA for transfers and mobility with walker. Pt reported limited today 2/2 medications making her feel \"loopy\" however feel pt would be able to discharge home with  to assist initially. Pt would benefit from continued OT while in acute care. Educated on benefits of [place] therapy upon discharge home if she feels she needs it, verb understanding. Continue OT POC. Treatment Diagnosis: decreased ADLs and transfers  Prognosis: Fair  Decision Making: Medium Complexity  REQUIRES OT FOLLOW-UP: Yes  Activity Tolerance  Activity Tolerance: Patient Tolerated treatment well;Patient limited by fatigue  Activity Tolerance Comments: Pt reported dizziness upon sitting EOB, however /82. Limited standing tolerance 2/2 feeling \"loopy\". RN aware. Plan   Occupational Therapy Plan  Times Per Week: 2-5  Times Per Day: Once a day  Current Treatment Recommendations: Strengthening, Functional mobility training, Endurance training, Self-Care / ADL, Coordination training     Restrictions  Position Activity Restriction  Other position/activity restrictions: ambulate pt    Subjective   General  Chart Reviewed: Yes  Additional Pertinent Hx: Pt admitted to ED with c/o abdominal pain x 4 days. Diagnostic EGD on 3/9. Started on ABX. PMHx includes:  arthritis, asthma, legal blindness, CVA, depression, DM, OM, TIA. Family / Caregiver Present: Yes (significant other)  Referring Practitioner: Eliezer Rai MD  Subjective  Subjective: Pt semi supine in bed upon arrival, agreeable to OT eval and treat.      Social/Functional History  Social/Functional History  Lives With: Significant other  Type of Home: House  Home Layout: Able to Live on Main level with bedroom/bathroom, One level  Home Access: Ramped entrance, Stairs to enter with rails (ramp in back of home, stairs in front of home- usually gets in/ out of house with stairs)  Entrance Stairs - Number of Steps: 5+7  Entrance Stairs - Rails: Left  Bathroom Shower/Tub: Walk-in shower  Bathroom Toilet: Standard (sink nearby for leverage)  Bathroom Equipment: Grab bars in shower, Shower chair, Hand-held shower  Home Equipment: Electric scooter, Walker, rolling, Reacher  Has the patient had two or more falls in the past year or any fall with injury in the past year?: No  Receives Help From: Family  ADL Assistance:  ( assists with donning bra and setting up clothes.  nearby when bathing)  Homemaking Assistance:  (microwave cooking)  Ambulation Assistance: Independent (with RW)  Transfer Assistance: Independent  Active : No  Patient's  Info:   Additional Comments:  works m-f but       Objective              Safety Devices  Type of Devices: Left in chair;Call light within reach; Chair alarm in place     Toilet Transfers  Toilet - Technique: Ambulating  Equipment Used: Standard toilet  Toilet Transfer: Contact guard assistance  Toilet Transfers Comments: + grab bar on pts L side     ADL  Grooming: Setup  Grooming Skilled Clinical Factors: seated in recliner chair for oral routine- pt declined completing standing at sink 2/2 feeling too \"loopy\" from Rx. Required assist to set up/ gather materials but was able to brush teeth from seated position. LE Dressing: Moderate assistance  LE Dressing Skilled Clinical Factors: dependent to doff brief- pt declined donning, and reporting she typically does not wear pants/ underwear at home. \"It just makes it easier\" Assist to don L shoe, pt was able to don R shoe with AFO but assist to buckle top strap. Toileting:  Moderate assistance  Toileting Skilled Clinical Factors: assist for meatal care- pt not able to reach back with L UE 2/2 difficulty managing IV line. Assist to doff brief. Pt was able to complete geovanny care from seated position with supervision        Bed mobility  Supine to Sit: Contact guard assistance  Transfers  Sit to stand: Contact guard assistance  Stand to sit: Contact guard assistance  Transfer Comments: Pt completed mobility via RW into bathroom with CGA, wearing AFO on R LE. Declined standing at sink 2/2 feeling \"loopy\" from Rx. Cueing to walk to bedside chair vs bed, however pt also endorsed significant visual deficits at baseline. Vision  Vision: Impaired  Vision Exceptions: Wears glasses for reading (has had multiple eye surgeries)  Hearing  Hearing: Within functional limits  Cognition  Overall Cognitive Status: [facility]  Cognition Comment: pt somewhat tangental at times during conversation- reporting 2/2 medications. Orientation  Overall Orientation Status: Within Functional Limits                  Education Given To: Patient  Education Provided: Role of Therapy;Plan of Care  Education Method: Verbal  Barriers to Learning: None  Education Outcome: Verbalized understanding  RUE AROM (degrees)  RUE General AROM: R UE kaykay from past CVA. Pt able to use walker with R UE         Treatment included functional transfer training, ADLs, and patient education.        AM-PAC Score        AM-[facility] Inpatient Daily Activity Raw Score: 18 (03/10/23 1253)  AM-PAC Inpatient ADL T-Scale Score : 38.66 (03/10/23 1253)  ADL Inpatient CMS 0-100% Score: 46.65 (03/10/23 1253)  ADL Inpatient CMS G-Code Modifier : CK (03/10/23 1253)    Tinneti Score       Goals  Short Term Goals  Time Frame for Short Term Goals: by dc  Short Term Goal 1: Pt will complete standing level grooming at mod I  Short Term Goal 2: Pt will complete toileting at mod I  Short Term Goal 3: Pt will complete UB dressing at mod I  Patient Goals   Patient goals : to go home       Therapy Time   Individual Concurrent Group Co-treatment   Time In 1045         Time Out 8157 Minutes 68         Timed Code Treatment Minutes: 53 Minutes (+15 min eval)   Kusum RAUSCH  [address] [facility], OTR/L [id number]

## 2023-03-10 NOTE — PLAN OF CARE
Problem: Discharge Planning  Goal: Discharge to home or other facility with appropriate resources  3/9/2023 2332 by Anya Peterson RN  Outcome: Progressing  Flowsheets (Taken 3/9/2023 2332)  Discharge to home or other facility with appropriate resources: Identify barriers to discharge with patient and caregiver  Note: Pt on iv abx. EGD performed today. Pt on cardizem drip, to be dcd in am.     Problem: Pain  Goal: Verbalizes/displays adequate comfort level or baseline comfort level  3/9/2023 2332 by Anya Peterson RN  Outcome: Progressing  Flowsheets (Taken 3/9/2023 2332)  Verbalizes/displays adequate comfort level or baseline comfort level:   Encourage patient to monitor pain and request assistance   Administer analgesics based on type and severity of pain and evaluate response  Note: Pt stated she didn't have pain all day until tonight after dinner. 7/10 R abd pain. Prn dualdid given. Will cont to assess. Problem: Cardiovascular - Adult  Goal: Absence of cardiac dysrhythmias or at baseline  3/9/2023 2332 by Anya Peterson RN  Outcome: Progressing  Flowsheets (Taken 3/9/2023 2332)  Absence of cardiac dysrhythmias or at baseline:   Monitor cardiac rate and rhythm   Administer antiarrhythmia medication and electrolyte replacement as ordered  Note: Pt had another episode of afib rvr on dayshift. On cardizem drip, NSR currently. Vitals stable.

## 2023-03-10 NOTE — PROGRESS NOTES
[facility]              Progress Note      Admit Date 3/6/2023  HPI: doing well from cards standpoint. Tolerating propafenone by ekg. She does feel \"loopy, cloudy\" but unlikely propafenone related. Interval history:     Ms. Yumiko Wilson denies chest pain, shortness of breath, palpitations      Subjective:       Scheduled Meds:   metoprolol  5 mg IntraVENous Once    enoxaparin  1 mg/kg SubCUTAneous BID    propafenone  225 mg Oral 2 times per day    pantoprazole  40 mg Oral QAM AC    fluconazole  100 mg Oral Daily    senna  1 tablet Oral BID    metoprolol tartrate  25 mg Oral BID    sucralfate  1 g Oral 3 times per day    insulin glargine  10 Units SubCUTAneous Nightly    lidocaine 1 % injection  5 mL IntraDERmal Once    sodium chloride flush  5-40 mL IntraVENous 2 times per day    insulin lispro  0-4 Units SubCUTAneous TID WC    miconazole   Topical BID    insulin lispro  0-4 Units SubCUTAneous Nightly    meropenem  1,000 mg IntraVENous Q8H    docusate sodium  100 mg Oral BID    polyethylene glycol  17 g Oral BID     Continuous Infusions:   sodium chloride 100 mL/hr at 23 1718    dextrose       PRN Meds:HYDROmorphone, acetaminophen **OR** acetaminophen, sodium chloride flush, sodium chloride flush, sodium chloride, ondansetron **OR** ondansetron, glucose, dextrose bolus **OR** dextrose bolus, glucagon (rDNA), dextrose, sodium chloride flush       Objective:      Wt Readings from Last 3 Encounters:   03/10/23 252 lb 10.4 oz (114.6 kg)   23 245 lb 6 oz (111.3 kg)   22 246 lb 14.6 oz (112 kg)   Admit weight: Weight: 247 lb 9.6 oz (112.3 kg)      Temperature range over 24hrs:   Temp  Av.1 °F (36.7 °C)  Min: 97.6 °F (36.4 °C)  Max: 98.8 °F (37.1 °C)  Current Respiratory Rate:  Resp: 22  Current Pulse:  Heart Rate: 74  Current Blood Pressure:  BP: 139/61  24hr Blood Pressure Range:  Systolic (27OGM), GIZ:446 , Min:116 , LTU:279   ; Diastolic (09ZZA), PSN:68, Min:56, Max:90    Current Pulse Oximetry:  SpO2: 90 %      Intake/Output Summary (Last 24 hours) at 3/10/2023 1229  Last data filed at 3/10/2023 0720  Gross per 24 hour   Intake 273.55 ml   Output 675 ml   Net -401.45 ml       Telemetry monitor:     Physical Exam:  General:  Awake, alert, NAD, obese  Psych : does not want to take meds  Skin:  Warm and dry  Chest:  Clear to auscultation, respiration easy  Cardiovascular:  RRR S1S2 no murmur to auscultation; no JVD  Abdomen:   Bowel sounds normal, abd soft, non-tender  Extremities:  No edema  : unremarkable      Imaging      Lab Review     Renal Profile:   Lab Results   Component Value Date/Time    CREATININE 0.5 03/10/2023 04:59 AM    BUN 11 03/10/2023 04:59 AM     03/10/2023 04:59 AM    K 4.6 03/10/2023 04:59 AM    K 4.2 03/06/2023 07:00 PM     03/10/2023 04:59 AM    CO2 28 03/10/2023 04:59 AM     CBC:    Lab Results   Component Value Date/Time    WBC 6.8 03/10/2023 04:59 AM    RBC 4.34 03/10/2023 04:59 AM    HGB 12.5 03/10/2023 04:59 AM    HCT 36.3 03/10/2023 04:59 AM    MCV 83.7 03/10/2023 04:59 AM    RDW 13.7 03/10/2023 04:59 AM     03/10/2023 04:59 AM     BNP:  No results found for: BNP  Fasting Lipid Panel:    Lab Results   Component Value Date/Time    CHOL 148 03/08/2023 04:00 AM    HDL 29 03/08/2023 04:00 AM    TRIG 129 03/08/2023 04:00 AM     Cardiac Enzymes:  No results found for: CKTOTAL, CKMB, CKMBINDEX, TROPONINI  PT/ INR   Lab Results   Component Value Date/Time    INR 0.93 05/23/2015 02:47 PM    INR 1.09 04/17/2014 10:25 PM    PROTIME 10.0 05/23/2015 02:47 PM    PROTIME 11.7 04/17/2014 10:25 PM     PTT No results found for: PTT   Lab Results   Component Value Date/Time    MG 1.90 03/10/2023 04:59 AM      Lab Results   Component Value Date/Time    TSH 1.96 03/09/2023 05:46 AM       Assessment/Plan:     Patient Active Problem List   Diagnosis    Cellulitis, leg    Foot ulcer ([facility] [address]    Group B streptococcal infection    Enterococcus faecalis infection    Charcot foot due to diabetes mellitus ([address]    Diabetic foot infection ([address]    Foot abscess    Diabetic infection of right foot ([address]    Depression    Asthma    Diabetes mellitus type 2 in obese ([address]    Toe ulcer ([address]    Cellulitis of foot    Cerebral infarction ([address]    Ataxia    Encounter for loop recorder check    Palpitations    Diabetic ulcer of toe of right foot associated with type 2 diabetes mellitus, with fat layer exposed ([address]    Abdominal pain    PAF (paroxysmal atrial fibrillation) ([address]    Transaminitis      Discussed propafenone and following up in office. She is agreeable. Called scheduling to find appt in next 1-2 weeks. No change in propafenone.  Transition to [facility] discussed with team.

## 2023-03-10 NOTE — PLAN OF CARE
Problem: Discharge Planning  Goal: Discharge to home or other facility with appropriate resources  3/10/2023 0917 by Nicki Valdivia RN  Outcome: Progressing  Flowsheets (Taken 3/10/2023 5120)  Discharge to home or other facility with appropriate resources:   Identify barriers to discharge with patient and caregiver   Arrange for needed discharge resources and transportation as appropriate   Identify discharge learning needs (meds, wound care, etc)   Refer to discharge planning if patient needs post-hospital services based on physician order or complex needs related to functional status, cognitive ability or social support system     Problem: Pain  Goal: Verbalizes/displays adequate comfort level or baseline comfort level  3/10/2023 0917 by Nicki Valdivia RN  Outcome: Progressing  Flowsheets (Taken 3/10/2023 0917)  Verbalizes/displays adequate comfort level or baseline comfort level:   Encourage patient to monitor pain and request assistance   Assess pain using appropriate pain scale   Administer analgesics based on type and severity of pain and evaluate response   Implement non-pharmacological measures as appropriate and evaluate response   Notify Licensed Independent Practitioner if interventions unsuccessful or patient reports new pain     Problem: Skin/Tissue Integrity  Goal: Absence of new skin breakdown  Description: 1. Monitor for areas of redness and/or skin breakdown  2. Assess vascular access sites hourly  3. Every 4-6 hours minimum:  Change oxygen saturation probe site  4. Every 4-6 hours:  If on nasal continuous positive airway pressure, respiratory therapy assess nares and determine need for appliance change or resting period.   Outcome: Progressing     Problem: Safety - Adult  Goal: Free from fall injury  Outcome: Progressing  Flowsheets (Taken 3/10/2023 0917)  Free From Fall Injury: Instruct family/caregiver on patient safety     Problem: ABCDS Injury Assessment  Goal: Absence of physical injury  Outcome: Progressing  Flowsheets (Taken 3/10/2023 0917)  Absence of Physical Injury: Implement safety measures based on patient assessment     Problem: Cardiovascular - Adult  Goal: Maintains optimal cardiac output and hemodynamic stability  Outcome: Progressing  Flowsheets (Taken 3/10/2023 0917)  Maintains optimal cardiac output and hemodynamic stability:   Monitor blood pressure and heart rate   Monitor urine output and notify Licensed Independent Practitioner for values outside of normal range   Assess for signs of decreased cardiac output   Administer fluid and/or volume expanders as ordered   Administer vasoactive medications as ordered     Problem: Cardiovascular - Adult  Goal: Absence of cardiac dysrhythmias or at baseline  3/9/2023 2332 by Elizabeth Moreno RN  Outcome: Progressing  Flowsheets (Taken 3/9/2023 2332)  Absence of cardiac dysrhythmias or at baseline:   Monitor cardiac rate and rhythm   Administer antiarrhythmia medication and electrolyte replacement as ordered  Note: Pt had another episode of afib rvr on dayshift. On cardizem drip, NSR currently. Vitals stable.      Problem: Chronic Conditions and Co-morbidities  Goal: Patient's chronic conditions and co-morbidity symptoms are monitored and maintained or improved  Outcome: Progressing  Flowsheets (Taken 3/10/2023 0917)  Care Plan - Patient's Chronic Conditions and Co-Morbidity Symptoms are Monitored and Maintained or Improved:   Monitor and assess patient's chronic conditions and comorbid symptoms for stability, deterioration, or improvement   Collaborate with multidisciplinary team to address chronic and comorbid conditions and prevent exacerbation or deterioration   Update acute care plan with appropriate goals if chronic or comorbid symptoms are exacerbated and prevent overall improvement and discharge

## 2023-03-11 LAB
BLOOD CULTURE, ROUTINE: NORMAL
CULTURE, BLOOD 2: NORMAL

## 2023-03-31 ENCOUNTER — TELEPHONE (OUTPATIENT)
Dept: CASE MANAGEMENT | Age: 63
End: 2023-03-31

## 2023-03-31 NOTE — TELEPHONE ENCOUNTER
Pt with incidental pulmonary nodules noted on CTA 3/8 at [facility]..  Notification letter mailed to pt with follow-up recommendation.       Shilpa PANDAN, RN   Lung Nodule Navigator  [facility].  [phone]

## 2023-04-12 ENCOUNTER — APPOINTMENT (OUTPATIENT)
Dept: GENERAL RADIOLOGY | Age: 63
DRG: 622 | End: 2023-04-12
Payer: COMMERCIAL

## 2023-04-12 ENCOUNTER — HOSPITAL ENCOUNTER (INPATIENT)
Age: 63
LOS: 21 days | Discharge: NURSING, REHAB OR LONG-TERM CARE | DRG: 622 | End: 2023-05-03
Attending: STUDENT IN AN ORGANIZED HEALTH CARE EDUCATION/TRAINING PROGRAM | Admitting: INTERNAL MEDICINE
Payer: COMMERCIAL

## 2023-04-12 DIAGNOSIS — E08.622: ICD-10-CM

## 2023-04-12 DIAGNOSIS — L98.422: ICD-10-CM

## 2023-04-12 DIAGNOSIS — E11.622 DIABETIC ULCER OF ANKLE ASSOCIATED WITH TYPE 2 DIABETES MELLITUS, WITH FAT LAYER EXPOSED (HCC): ICD-10-CM

## 2023-04-12 DIAGNOSIS — L02.91 PURULENT ABSCESS: Primary | ICD-10-CM

## 2023-04-12 DIAGNOSIS — L97.302 DIABETIC ULCER OF ANKLE ASSOCIATED WITH TYPE 2 DIABETES MELLITUS, WITH FAT LAYER EXPOSED (HCC): ICD-10-CM

## 2023-04-12 PROBLEM — Z86.73 HISTORY OF CVA IN ADULTHOOD: Status: ACTIVE | Noted: 2023-04-12

## 2023-04-12 LAB
ANION GAP SERPL CALCULATED.3IONS-SCNC: 8 MMOL/L (ref 3–16)
BACTERIA URNS QL MICRO: ABNORMAL /HPF
BASOPHILS # BLD: 0 K/UL (ref 0–0.2)
BASOPHILS NFR BLD: 0.5 %
BILIRUB UR QL STRIP.AUTO: NEGATIVE
BUN SERPL-MCNC: 27 MG/DL (ref 7–20)
CALCIUM SERPL-MCNC: 9.2 MG/DL (ref 8.3–10.6)
CHLORIDE SERPL-SCNC: 94 MMOL/L (ref 99–110)
CLARITY UR: CLEAR
CO2 SERPL-SCNC: 37 MMOL/L (ref 21–32)
COLOR UR: YELLOW
CREAT SERPL-MCNC: 0.6 MG/DL (ref 0.6–1.2)
CRP SERPL-MCNC: 89.6 MG/L (ref 0–5.1)
DEPRECATED RDW RBC AUTO: 14.5 % (ref 12.4–15.4)
EKG ATRIAL RATE: 74 BPM
EKG DIAGNOSIS: NORMAL
EKG P AXIS: 11 DEGREES
EKG P-R INTERVAL: 166 MS
EKG Q-T INTERVAL: 418 MS
EKG QRS DURATION: 100 MS
EKG QTC CALCULATION (BAZETT): 463 MS
EKG R AXIS: -41 DEGREES
EKG T AXIS: 187 DEGREES
EKG VENTRICULAR RATE: 74 BPM
EOSINOPHIL # BLD: 0.1 K/UL (ref 0–0.6)
EOSINOPHIL NFR BLD: 0.8 %
EPI CELLS #/AREA URNS HPF: ABNORMAL /HPF (ref 0–5)
ERYTHROCYTE [SEDIMENTATION RATE] IN BLOOD BY WESTERGREN METHOD: 77 MM/HR (ref 0–30)
GFR SERPLBLD CREATININE-BSD FMLA CKD-EPI: >60 ML/MIN/{1.73_M2}
GLUCOSE BLD-MCNC: 221 MG/DL (ref 70–99)
GLUCOSE BLD-MCNC: 234 MG/DL (ref 70–99)
GLUCOSE SERPL-MCNC: 271 MG/DL (ref 70–99)
GLUCOSE UR STRIP.AUTO-MCNC: NEGATIVE MG/DL
HCT VFR BLD AUTO: 30 % (ref 36–48)
HGB BLD-MCNC: 9.7 G/DL (ref 12–16)
HGB UR QL STRIP.AUTO: ABNORMAL
KETONES UR STRIP.AUTO-MCNC: NEGATIVE MG/DL
LACTATE BLDV-SCNC: 1.1 MMOL/L (ref 0.4–1.9)
LACTATE BLDV-SCNC: 1.2 MMOL/L (ref 0.4–1.9)
LEUKOCYTE ESTERASE UR QL STRIP.AUTO: NEGATIVE
LYMPHOCYTES # BLD: 0.9 K/UL (ref 1–5.1)
LYMPHOCYTES NFR BLD: 13.3 %
MCH RBC QN AUTO: 26.9 PG (ref 26–34)
MCHC RBC AUTO-ENTMCNC: 32.4 G/DL (ref 31–36)
MCV RBC AUTO: 83.1 FL (ref 80–100)
MONOCYTES # BLD: 0.4 K/UL (ref 0–1.3)
MONOCYTES NFR BLD: 5.6 %
NEUTROPHILS # BLD: 5.4 K/UL (ref 1.7–7.7)
NEUTROPHILS NFR BLD: 79.8 %
NITRITE UR QL STRIP.AUTO: NEGATIVE
PERFORMED ON: ABNORMAL
PERFORMED ON: ABNORMAL
PH UR STRIP.AUTO: 6 [PH] (ref 5–8)
PLATELET # BLD AUTO: 395 K/UL (ref 135–450)
PMV BLD AUTO: 7.1 FL (ref 5–10.5)
POTASSIUM SERPL-SCNC: 4.2 MMOL/L (ref 3.5–5.1)
PROT UR STRIP.AUTO-MCNC: NEGATIVE MG/DL
RBC # BLD AUTO: 3.61 M/UL (ref 4–5.2)
RBC #/AREA URNS HPF: ABNORMAL /HPF (ref 0–4)
RENAL EPI CELLS #/AREA UR COMP ASSIST: ABNORMAL /HPF (ref 0–1)
SODIUM SERPL-SCNC: 139 MMOL/L (ref 136–145)
SP GR UR STRIP.AUTO: 1.01 (ref 1–1.03)
TROPONIN T SERPL-MCNC: 0.17 NG/ML
TROPONIN T SERPL-MCNC: 0.2 NG/ML
UA COMPLETE W REFLEX CULTURE PNL UR: ABNORMAL
UA DIPSTICK W REFLEX MICRO PNL UR: YES
URN SPEC COLLECT METH UR: ABNORMAL
UROBILINOGEN UR STRIP-ACNC: 0.2 E.U./DL
WBC # BLD AUTO: 6.8 K/UL (ref 4–11)
WBC #/AREA URNS HPF: ABNORMAL /HPF (ref 0–5)

## 2023-04-12 PROCEDURE — 6360000002 HC RX W HCPCS: Performed by: STUDENT IN AN ORGANIZED HEALTH CARE EDUCATION/TRAINING PROGRAM

## 2023-04-12 PROCEDURE — 2580000003 HC RX 258

## 2023-04-12 PROCEDURE — 93005 ELECTROCARDIOGRAM TRACING: CPT | Performed by: STUDENT IN AN ORGANIZED HEALTH CARE EDUCATION/TRAINING PROGRAM

## 2023-04-12 PROCEDURE — 83036 HEMOGLOBIN GLYCOSYLATED A1C: CPT

## 2023-04-12 PROCEDURE — 87077 CULTURE AEROBIC IDENTIFY: CPT

## 2023-04-12 PROCEDURE — 2580000003 HC RX 258: Performed by: STUDENT IN AN ORGANIZED HEALTH CARE EDUCATION/TRAINING PROGRAM

## 2023-04-12 PROCEDURE — 73590 X-RAY EXAM OF LOWER LEG: CPT

## 2023-04-12 PROCEDURE — 2500000003 HC RX 250 WO HCPCS: Performed by: STUDENT IN AN ORGANIZED HEALTH CARE EDUCATION/TRAINING PROGRAM

## 2023-04-12 PROCEDURE — 80048 BASIC METABOLIC PNL TOTAL CA: CPT

## 2023-04-12 PROCEDURE — 83605 ASSAY OF LACTIC ACID: CPT

## 2023-04-12 PROCEDURE — 87040 BLOOD CULTURE FOR BACTERIA: CPT

## 2023-04-12 PROCEDURE — 87075 CULTR BACTERIA EXCEPT BLOOD: CPT

## 2023-04-12 PROCEDURE — 87205 SMEAR GRAM STAIN: CPT

## 2023-04-12 PROCEDURE — 96365 THER/PROPH/DIAG IV INF INIT: CPT

## 2023-04-12 PROCEDURE — 36415 COLL VENOUS BLD VENIPUNCTURE: CPT

## 2023-04-12 PROCEDURE — 84484 ASSAY OF TROPONIN QUANT: CPT

## 2023-04-12 PROCEDURE — 6360000002 HC RX W HCPCS

## 2023-04-12 PROCEDURE — 6370000000 HC RX 637 (ALT 250 FOR IP): Performed by: STUDENT IN AN ORGANIZED HEALTH CARE EDUCATION/TRAINING PROGRAM

## 2023-04-12 PROCEDURE — 96368 THER/DIAG CONCURRENT INF: CPT

## 2023-04-12 PROCEDURE — 87070 CULTURE OTHR SPECIMN AEROBIC: CPT

## 2023-04-12 PROCEDURE — 73630 X-RAY EXAM OF FOOT: CPT

## 2023-04-12 PROCEDURE — 85652 RBC SED RATE AUTOMATED: CPT

## 2023-04-12 PROCEDURE — 84134 ASSAY OF PREALBUMIN: CPT

## 2023-04-12 PROCEDURE — 85025 COMPLETE CBC W/AUTO DIFF WBC: CPT

## 2023-04-12 PROCEDURE — 86140 C-REACTIVE PROTEIN: CPT

## 2023-04-12 PROCEDURE — 81001 URINALYSIS AUTO W/SCOPE: CPT

## 2023-04-12 PROCEDURE — 99285 EMERGENCY DEPT VISIT HI MDM: CPT

## 2023-04-12 PROCEDURE — 1200000000 HC SEMI PRIVATE

## 2023-04-12 RX ORDER — METHOCARBAMOL 500 MG/1
500 TABLET, FILM COATED ORAL 4 TIMES DAILY PRN
COMMUNITY

## 2023-04-12 RX ORDER — METOPROLOL TARTRATE 50 MG/1
50 TABLET, FILM COATED ORAL 2 TIMES DAILY
Status: DISCONTINUED | OUTPATIENT
Start: 2023-04-12 | End: 2023-05-03 | Stop reason: HOSPADM

## 2023-04-12 RX ORDER — POLYETHYLENE GLYCOL 3350 17 G/17G
17 POWDER, FOR SOLUTION ORAL DAILY PRN
Status: DISCONTINUED | OUTPATIENT
Start: 2023-04-12 | End: 2023-04-13

## 2023-04-12 RX ORDER — DEXTROSE MONOHYDRATE 100 MG/ML
INJECTION, SOLUTION INTRAVENOUS CONTINUOUS PRN
Status: DISCONTINUED | OUTPATIENT
Start: 2023-04-12 | End: 2023-05-03 | Stop reason: HOSPADM

## 2023-04-12 RX ORDER — MAGNESIUM 30 MG
60 TABLET ORAL DAILY
Status: CANCELLED | OUTPATIENT
Start: 2023-04-12

## 2023-04-12 RX ORDER — INSULIN GLARGINE 100 [IU]/ML
10 INJECTION, SOLUTION SUBCUTANEOUS NIGHTLY
Status: DISCONTINUED | OUTPATIENT
Start: 2023-04-12 | End: 2023-04-20

## 2023-04-12 RX ORDER — ENOXAPARIN SODIUM 100 MG/ML
30 INJECTION SUBCUTANEOUS 2 TIMES DAILY
Status: CANCELLED | OUTPATIENT
Start: 2023-04-12

## 2023-04-12 RX ORDER — METOPROLOL TARTRATE 50 MG/1
50 TABLET, FILM COATED ORAL 2 TIMES DAILY
COMMUNITY

## 2023-04-12 RX ORDER — SODIUM CHLORIDE 9 MG/ML
INJECTION, SOLUTION INTRAVENOUS CONTINUOUS
Status: ACTIVE | OUTPATIENT
Start: 2023-04-12 | End: 2023-04-13

## 2023-04-12 RX ORDER — LANOLIN ALCOHOL/MO/W.PET/CERES
400 CREAM (GRAM) TOPICAL DAILY
COMMUNITY

## 2023-04-12 RX ORDER — ONDANSETRON 2 MG/ML
4 INJECTION INTRAMUSCULAR; INTRAVENOUS EVERY 6 HOURS PRN
Status: DISCONTINUED | OUTPATIENT
Start: 2023-04-12 | End: 2023-05-03 | Stop reason: HOSPADM

## 2023-04-12 RX ORDER — ATORVASTATIN CALCIUM 80 MG/1
80 TABLET, FILM COATED ORAL NIGHTLY
Status: ON HOLD | COMMUNITY
End: 2023-05-02 | Stop reason: HOSPADM

## 2023-04-12 RX ORDER — GLUCAGON 1 MG/ML
1 KIT INJECTION PRN
Status: DISCONTINUED | OUTPATIENT
Start: 2023-04-12 | End: 2023-05-03 | Stop reason: HOSPADM

## 2023-04-12 RX ORDER — SODIUM CHLORIDE 0.9 % (FLUSH) 0.9 %
5-40 SYRINGE (ML) INJECTION PRN
Status: DISCONTINUED | OUTPATIENT
Start: 2023-04-12 | End: 2023-04-26 | Stop reason: SDUPTHER

## 2023-04-12 RX ORDER — VITAMIN B COMPLEX
1 CAPSULE ORAL DAILY
Status: DISCONTINUED | OUTPATIENT
Start: 2023-04-13 | End: 2023-05-03 | Stop reason: HOSPADM

## 2023-04-12 RX ORDER — INSULIN LISPRO 100 [IU]/ML
0-4 INJECTION, SOLUTION INTRAVENOUS; SUBCUTANEOUS NIGHTLY
Status: DISCONTINUED | OUTPATIENT
Start: 2023-04-12 | End: 2023-04-27

## 2023-04-12 RX ORDER — ACETAMINOPHEN 650 MG/1
650 SUPPOSITORY RECTAL EVERY 6 HOURS PRN
Status: DISCONTINUED | OUTPATIENT
Start: 2023-04-12 | End: 2023-04-25 | Stop reason: SDUPTHER

## 2023-04-12 RX ORDER — SPIRONOLACTONE 25 MG/1
25 TABLET ORAL DAILY
Status: ON HOLD | COMMUNITY
End: 2023-05-02 | Stop reason: HOSPADM

## 2023-04-12 RX ORDER — CETIRIZINE HYDROCHLORIDE 10 MG/1
10 TABLET ORAL DAILY
Status: DISCONTINUED | OUTPATIENT
Start: 2023-04-12 | End: 2023-04-12

## 2023-04-12 RX ORDER — ONDANSETRON 4 MG/1
4 TABLET, ORALLY DISINTEGRATING ORAL EVERY 8 HOURS PRN
Status: DISCONTINUED | OUTPATIENT
Start: 2023-04-12 | End: 2023-05-03 | Stop reason: HOSPADM

## 2023-04-12 RX ORDER — POTASSIUM CHLORIDE 20 MEQ/1
20 TABLET, EXTENDED RELEASE ORAL 2 TIMES DAILY
Status: ON HOLD | COMMUNITY
End: 2023-05-02 | Stop reason: HOSPADM

## 2023-04-12 RX ORDER — METRONIDAZOLE 500 MG/100ML
500 INJECTION, SOLUTION INTRAVENOUS ONCE
Status: COMPLETED | OUTPATIENT
Start: 2023-04-12 | End: 2023-04-12

## 2023-04-12 RX ORDER — CLOPIDOGREL BISULFATE 75 MG/1
75 TABLET ORAL DAILY
Status: DISCONTINUED | OUTPATIENT
Start: 2023-04-13 | End: 2023-04-26 | Stop reason: SDUPTHER

## 2023-04-12 RX ORDER — MIDODRINE HYDROCHLORIDE 5 MG/1
5 TABLET ORAL 3 TIMES DAILY
COMMUNITY

## 2023-04-12 RX ORDER — PANTOPRAZOLE SODIUM 40 MG/1
40 TABLET, DELAYED RELEASE ORAL
Status: DISCONTINUED | OUTPATIENT
Start: 2023-04-13 | End: 2023-04-12

## 2023-04-12 RX ORDER — ASPIRIN 81 MG/1
81 TABLET ORAL DAILY
Status: DISCONTINUED | OUTPATIENT
Start: 2023-04-12 | End: 2023-05-03 | Stop reason: HOSPADM

## 2023-04-12 RX ORDER — B-COMPLEX WITH VITAMIN C
1 TABLET ORAL DAILY
Status: DISCONTINUED | OUTPATIENT
Start: 2023-04-12 | End: 2023-04-12 | Stop reason: SDUPTHER

## 2023-04-12 RX ORDER — INSULIN LISPRO 100 [IU]/ML
0-4 INJECTION, SOLUTION INTRAVENOUS; SUBCUTANEOUS
Status: DISCONTINUED | OUTPATIENT
Start: 2023-04-12 | End: 2023-04-27

## 2023-04-12 RX ORDER — ACETAMINOPHEN 325 MG/1
650 TABLET ORAL EVERY 6 HOURS PRN
COMMUNITY

## 2023-04-12 RX ORDER — CLOPIDOGREL BISULFATE 75 MG/1
75 TABLET ORAL DAILY
COMMUNITY

## 2023-04-12 RX ORDER — ACETAMINOPHEN 325 MG/1
650 TABLET ORAL EVERY 6 HOURS PRN
Status: DISCONTINUED | OUTPATIENT
Start: 2023-04-12 | End: 2023-04-25 | Stop reason: SDUPTHER

## 2023-04-12 RX ORDER — ESCITALOPRAM OXALATE 10 MG/1
10 TABLET ORAL DAILY
COMMUNITY

## 2023-04-12 RX ORDER — SODIUM CHLORIDE 0.9 % (FLUSH) 0.9 %
5-40 SYRINGE (ML) INJECTION EVERY 12 HOURS SCHEDULED
Status: DISCONTINUED | OUTPATIENT
Start: 2023-04-12 | End: 2023-04-26 | Stop reason: SDUPTHER

## 2023-04-12 RX ORDER — INSULIN GLARGINE 100 [IU]/ML
4 INJECTION, SOLUTION SUBCUTANEOUS NIGHTLY
Status: DISCONTINUED | OUTPATIENT
Start: 2023-04-12 | End: 2023-04-12

## 2023-04-12 RX ORDER — SODIUM CHLORIDE 9 MG/ML
INJECTION, SOLUTION INTRAVENOUS PRN
Status: DISCONTINUED | OUTPATIENT
Start: 2023-04-12 | End: 2023-04-26 | Stop reason: SDUPTHER

## 2023-04-12 RX ADMIN — INSULIN GLARGINE 10 UNITS: 100 INJECTION, SOLUTION SUBCUTANEOUS at 20:42

## 2023-04-12 RX ADMIN — VANCOMYCIN HYDROCHLORIDE 2000 MG: 10 INJECTION, POWDER, LYOPHILIZED, FOR SOLUTION INTRAVENOUS at 18:55

## 2023-04-12 RX ADMIN — METRONIDAZOLE 500 MG: 500 INJECTION, SOLUTION INTRAVENOUS at 15:37

## 2023-04-12 RX ADMIN — SODIUM CHLORIDE: 9 INJECTION, SOLUTION INTRAVENOUS at 18:54

## 2023-04-12 RX ADMIN — CEFEPIME 1000 MG: 1 INJECTION, POWDER, FOR SOLUTION INTRAMUSCULAR; INTRAVENOUS at 15:35

## 2023-04-12 ASSESSMENT — ENCOUNTER SYMPTOMS
VOMITING: 0
SHORTNESS OF BREATH: 0
CONSTIPATION: 0
BACK PAIN: 0
NAUSEA: 0
SORE THROAT: 0
EYES NEGATIVE: 1
COUGH: 0
RHINORRHEA: 0
ABDOMINAL PAIN: 0
CHEST TIGHTNESS: 0
DIARRHEA: 0
APNEA: 0
ABDOMINAL DISTENTION: 0

## 2023-04-12 ASSESSMENT — PAIN SCALES - GENERAL: PAINLEVEL_OUTOF10: 0

## 2023-04-12 ASSESSMENT — PAIN - FUNCTIONAL ASSESSMENT: PAIN_FUNCTIONAL_ASSESSMENT: NONE - DENIES PAIN

## 2023-04-13 ENCOUNTER — APPOINTMENT (OUTPATIENT)
Dept: VASCULAR LAB | Age: 63
DRG: 622 | End: 2023-04-13
Payer: COMMERCIAL

## 2023-04-13 ENCOUNTER — APPOINTMENT (OUTPATIENT)
Dept: MRI IMAGING | Age: 63
DRG: 622 | End: 2023-04-13
Payer: COMMERCIAL

## 2023-04-13 LAB
ANION GAP SERPL CALCULATED.3IONS-SCNC: 8 MMOL/L (ref 3–16)
BASOPHILS # BLD: 0 K/UL (ref 0–0.2)
BASOPHILS NFR BLD: 0.7 %
BUN SERPL-MCNC: 19 MG/DL (ref 7–20)
CALCIUM SERPL-MCNC: 9 MG/DL (ref 8.3–10.6)
CHLORIDE SERPL-SCNC: 100 MMOL/L (ref 99–110)
CO2 SERPL-SCNC: 34 MMOL/L (ref 21–32)
CREAT SERPL-MCNC: <0.5 MG/DL (ref 0.6–1.2)
DEPRECATED RDW RBC AUTO: 15 % (ref 12.4–15.4)
EOSINOPHIL # BLD: 0.1 K/UL (ref 0–0.6)
EOSINOPHIL NFR BLD: 2.6 %
EST. AVERAGE GLUCOSE BLD GHB EST-MCNC: 191.5 MG/DL
GFR SERPLBLD CREATININE-BSD FMLA CKD-EPI: >60 ML/MIN/{1.73_M2}
GLUCOSE BLD-MCNC: 130 MG/DL (ref 70–99)
GLUCOSE BLD-MCNC: 139 MG/DL (ref 70–99)
GLUCOSE BLD-MCNC: 161 MG/DL (ref 70–99)
GLUCOSE BLD-MCNC: 173 MG/DL (ref 70–99)
GLUCOSE SERPL-MCNC: 137 MG/DL (ref 70–99)
HBA1C MFR BLD: 8.3 %
HCT VFR BLD AUTO: 27.2 % (ref 36–48)
HGB BLD-MCNC: 9 G/DL (ref 12–16)
LYMPHOCYTES # BLD: 0.9 K/UL (ref 1–5.1)
LYMPHOCYTES NFR BLD: 16.3 %
MAGNESIUM SERPL-MCNC: 1.9 MG/DL (ref 1.8–2.4)
MCH RBC QN AUTO: 27.4 PG (ref 26–34)
MCHC RBC AUTO-ENTMCNC: 33 G/DL (ref 31–36)
MCV RBC AUTO: 83.1 FL (ref 80–100)
MONOCYTES # BLD: 0.4 K/UL (ref 0–1.3)
MONOCYTES NFR BLD: 8.2 %
NEUTROPHILS # BLD: 3.8 K/UL (ref 1.7–7.7)
NEUTROPHILS NFR BLD: 72.2 %
PERFORMED ON: ABNORMAL
PLATELET # BLD AUTO: 320 K/UL (ref 135–450)
PMV BLD AUTO: 6.4 FL (ref 5–10.5)
POTASSIUM SERPL-SCNC: 4 MMOL/L (ref 3.5–5.1)
RBC # BLD AUTO: 3.28 M/UL (ref 4–5.2)
SODIUM SERPL-SCNC: 142 MMOL/L (ref 136–145)
WBC # BLD AUTO: 5.3 K/UL (ref 4–11)

## 2023-04-13 PROCEDURE — 6360000002 HC RX W HCPCS

## 2023-04-13 PROCEDURE — 1200000000 HC SEMI PRIVATE

## 2023-04-13 PROCEDURE — 2580000003 HC RX 258

## 2023-04-13 PROCEDURE — 83735 ASSAY OF MAGNESIUM: CPT

## 2023-04-13 PROCEDURE — 80048 BASIC METABOLIC PNL TOTAL CA: CPT

## 2023-04-13 PROCEDURE — A9579 GAD-BASE MR CONTRAST NOS,1ML: HCPCS | Performed by: STUDENT IN AN ORGANIZED HEALTH CARE EDUCATION/TRAINING PROGRAM

## 2023-04-13 PROCEDURE — 85025 COMPLETE CBC W/AUTO DIFF WBC: CPT

## 2023-04-13 PROCEDURE — 99222 1ST HOSP IP/OBS MODERATE 55: CPT | Performed by: INTERNAL MEDICINE

## 2023-04-13 PROCEDURE — 36415 COLL VENOUS BLD VENIPUNCTURE: CPT

## 2023-04-13 PROCEDURE — 73723 MRI JOINT LWR EXTR W/O&W/DYE: CPT

## 2023-04-13 PROCEDURE — 6370000000 HC RX 637 (ALT 250 FOR IP): Performed by: STUDENT IN AN ORGANIZED HEALTH CARE EDUCATION/TRAINING PROGRAM

## 2023-04-13 PROCEDURE — 2500000003 HC RX 250 WO HCPCS

## 2023-04-13 PROCEDURE — 6370000000 HC RX 637 (ALT 250 FOR IP)

## 2023-04-13 PROCEDURE — 6370000000 HC RX 637 (ALT 250 FOR IP): Performed by: INTERNAL MEDICINE

## 2023-04-13 PROCEDURE — 6360000004 HC RX CONTRAST MEDICATION: Performed by: STUDENT IN AN ORGANIZED HEALTH CARE EDUCATION/TRAINING PROGRAM

## 2023-04-13 PROCEDURE — 93925 LOWER EXTREMITY STUDY: CPT

## 2023-04-13 RX ORDER — POLYETHYLENE GLYCOL 3350 17 G/17G
17 POWDER, FOR SOLUTION ORAL DAILY
Status: DISCONTINUED | OUTPATIENT
Start: 2023-04-13 | End: 2023-05-03 | Stop reason: HOSPADM

## 2023-04-13 RX ORDER — METRONIDAZOLE 500 MG/100ML
500 INJECTION, SOLUTION INTRAVENOUS EVERY 8 HOURS
Status: DISCONTINUED | OUTPATIENT
Start: 2023-04-13 | End: 2023-04-14 | Stop reason: ALTCHOICE

## 2023-04-13 RX ORDER — MAGNESIUM SULFATE 1 G/100ML
1000 INJECTION INTRAVENOUS ONCE
Status: COMPLETED | OUTPATIENT
Start: 2023-04-13 | End: 2023-04-13

## 2023-04-13 RX ORDER — ENOXAPARIN SODIUM 100 MG/ML
30 INJECTION SUBCUTANEOUS 2 TIMES DAILY
Status: DISCONTINUED | OUTPATIENT
Start: 2023-04-13 | End: 2023-04-23 | Stop reason: SDUPTHER

## 2023-04-13 RX ORDER — LIDOCAINE HYDROCHLORIDE 10 MG/ML
30 INJECTION, SOLUTION INFILTRATION; PERINEURAL ONCE
Status: DISCONTINUED | OUTPATIENT
Start: 2023-04-13 | End: 2023-05-03 | Stop reason: HOSPADM

## 2023-04-13 RX ADMIN — METRONIDAZOLE 500 MG: 500 INJECTION, SOLUTION INTRAVENOUS at 18:10

## 2023-04-13 RX ADMIN — MICONAZOLE NITRATE: 20 POWDER TOPICAL at 22:24

## 2023-04-13 RX ADMIN — METOPROLOL TARTRATE 50 MG: 50 TABLET, FILM COATED ORAL at 00:00

## 2023-04-13 RX ADMIN — Medication 1 CAPSULE: at 09:31

## 2023-04-13 RX ADMIN — ENOXAPARIN SODIUM 30 MG: 100 INJECTION SUBCUTANEOUS at 22:16

## 2023-04-13 RX ADMIN — METOPROLOL TARTRATE 50 MG: 50 TABLET, FILM COATED ORAL at 09:31

## 2023-04-13 RX ADMIN — METOPROLOL TARTRATE 50 MG: 50 TABLET, FILM COATED ORAL at 22:24

## 2023-04-13 RX ADMIN — MAGNESIUM SULFATE HEPTAHYDRATE 1000 MG: 1 INJECTION, SOLUTION INTRAVENOUS at 08:41

## 2023-04-13 RX ADMIN — CEFEPIME 2000 MG: 2 INJECTION, POWDER, FOR SOLUTION INTRAVENOUS at 09:49

## 2023-04-13 RX ADMIN — CLOPIDOGREL BISULFATE 75 MG: 75 TABLET ORAL at 09:31

## 2023-04-13 RX ADMIN — INSULIN GLARGINE 10 UNITS: 100 INJECTION, SOLUTION SUBCUTANEOUS at 22:18

## 2023-04-13 RX ADMIN — ASPIRIN 81 MG: 81 TABLET, COATED ORAL at 00:00

## 2023-04-13 RX ADMIN — GADOTERIDOL 20 ML: 279.3 INJECTION, SOLUTION INTRAVENOUS at 02:06

## 2023-04-13 RX ADMIN — CEFEPIME 2000 MG: 2 INJECTION, POWDER, FOR SOLUTION INTRAVENOUS at 17:31

## 2023-04-13 RX ADMIN — ASPIRIN 81 MG: 81 TABLET, COATED ORAL at 09:31

## 2023-04-13 RX ADMIN — HYDROMORPHONE HYDROCHLORIDE 0.5 MG: 1 INJECTION, SOLUTION INTRAMUSCULAR; INTRAVENOUS; SUBCUTANEOUS at 14:02

## 2023-04-13 RX ADMIN — METRONIDAZOLE 500 MG: 500 INJECTION, SOLUTION INTRAVENOUS at 09:51

## 2023-04-13 RX ADMIN — VANCOMYCIN HYDROCHLORIDE 1250 MG: 10 INJECTION, POWDER, LYOPHILIZED, FOR SOLUTION INTRAVENOUS at 06:43

## 2023-04-13 RX ADMIN — MICONAZOLE NITRATE: 20 POWDER TOPICAL at 10:00

## 2023-04-13 ASSESSMENT — PAIN SCALES - GENERAL
PAINLEVEL_OUTOF10: 0
PAINLEVEL_OUTOF10: 9
PAINLEVEL_OUTOF10: 0
PAINLEVEL_OUTOF10: 0
PAINLEVEL_OUTOF10: 2

## 2023-04-13 ASSESSMENT — PAIN DESCRIPTION - LOCATION
LOCATION: FOOT
LOCATION: FOOT

## 2023-04-13 ASSESSMENT — PAIN DESCRIPTION - DESCRIPTORS
DESCRIPTORS: ACHING
DESCRIPTORS: ACHING

## 2023-04-13 ASSESSMENT — PAIN DESCRIPTION - ORIENTATION
ORIENTATION: LEFT;RIGHT
ORIENTATION: LEFT

## 2023-04-13 ASSESSMENT — PAIN - FUNCTIONAL ASSESSMENT
PAIN_FUNCTIONAL_ASSESSMENT: PREVENTS OR INTERFERES SOME ACTIVE ACTIVITIES AND ADLS
PAIN_FUNCTIONAL_ASSESSMENT: ACTIVITIES ARE NOT PREVENTED

## 2023-04-14 PROBLEM — I25.5 ISCHEMIC CARDIOMYOPATHY: Status: ACTIVE | Noted: 2023-04-14

## 2023-04-14 PROBLEM — R77.8 ELEVATED TROPONIN: Status: ACTIVE | Noted: 2023-04-14

## 2023-04-14 PROBLEM — R79.89 ELEVATED TROPONIN: Status: ACTIVE | Noted: 2023-04-14

## 2023-04-14 PROBLEM — E11.42 DIABETIC POLYNEUROPATHY ASSOCIATED WITH TYPE 2 DIABETES MELLITUS (HCC): Status: ACTIVE | Noted: 2023-04-14

## 2023-04-14 PROBLEM — I25.2 HISTORY OF ST ELEVATION MYOCARDIAL INFARCTION (STEMI): Status: ACTIVE | Noted: 2023-04-14

## 2023-04-14 PROBLEM — M14.671 CHARCOT ANKLE, RIGHT: Status: ACTIVE | Noted: 2023-04-14

## 2023-04-14 LAB
ANION GAP SERPL CALCULATED.3IONS-SCNC: 9 MMOL/L (ref 3–16)
BASOPHILS # BLD: 0.1 K/UL (ref 0–0.2)
BASOPHILS NFR BLD: 1.2 %
BUN SERPL-MCNC: 17 MG/DL (ref 7–20)
CALCIUM SERPL-MCNC: 8.8 MG/DL (ref 8.3–10.6)
CHLORIDE SERPL-SCNC: 99 MMOL/L (ref 99–110)
CO2 SERPL-SCNC: 30 MMOL/L (ref 21–32)
CREAT SERPL-MCNC: <0.5 MG/DL (ref 0.6–1.2)
DEPRECATED RDW RBC AUTO: 14.9 % (ref 12.4–15.4)
EOSINOPHIL # BLD: 0.2 K/UL (ref 0–0.6)
EOSINOPHIL NFR BLD: 3.4 %
GFR SERPLBLD CREATININE-BSD FMLA CKD-EPI: >60 ML/MIN/{1.73_M2}
GLUCOSE BLD-MCNC: 122 MG/DL (ref 70–99)
GLUCOSE BLD-MCNC: 126 MG/DL (ref 70–99)
GLUCOSE BLD-MCNC: 130 MG/DL (ref 70–99)
GLUCOSE BLD-MCNC: 135 MG/DL (ref 70–99)
GLUCOSE SERPL-MCNC: 122 MG/DL (ref 70–99)
HCT VFR BLD AUTO: 28.4 % (ref 36–48)
HGB BLD-MCNC: 9.3 G/DL (ref 12–16)
LYMPHOCYTES # BLD: 1.2 K/UL (ref 1–5.1)
LYMPHOCYTES NFR BLD: 18.5 %
MAGNESIUM SERPL-MCNC: 1.8 MG/DL (ref 1.8–2.4)
MCH RBC QN AUTO: 27.2 PG (ref 26–34)
MCHC RBC AUTO-ENTMCNC: 32.8 G/DL (ref 31–36)
MCV RBC AUTO: 82.9 FL (ref 80–100)
MONOCYTES # BLD: 0.5 K/UL (ref 0–1.3)
MONOCYTES NFR BLD: 8.5 %
NEUTROPHILS # BLD: 4.4 K/UL (ref 1.7–7.7)
NEUTROPHILS NFR BLD: 68.4 %
PERFORMED ON: ABNORMAL
PLATELET # BLD AUTO: 232 K/UL (ref 135–450)
PLATELET BLD QL SMEAR: ADEQUATE
PMV BLD AUTO: 7.5 FL (ref 5–10.5)
POTASSIUM SERPL-SCNC: 3.9 MMOL/L (ref 3.5–5.1)
PREALB SERPL-MCNC: 10.2 MG/DL (ref 20–40)
RBC # BLD AUTO: 3.43 M/UL (ref 4–5.2)
REASON FOR REJECTION: NORMAL
REJECTED TEST: NORMAL
SODIUM SERPL-SCNC: 138 MMOL/L (ref 136–145)
VANCOMYCIN TROUGH SERPL-MCNC: 12.8 UG/ML (ref 10–20)
WBC # BLD AUTO: 6.4 K/UL (ref 4–11)

## 2023-04-14 PROCEDURE — 6360000002 HC RX W HCPCS

## 2023-04-14 PROCEDURE — 6370000000 HC RX 637 (ALT 250 FOR IP): Performed by: STUDENT IN AN ORGANIZED HEALTH CARE EDUCATION/TRAINING PROGRAM

## 2023-04-14 PROCEDURE — 6360000002 HC RX W HCPCS: Performed by: INTERNAL MEDICINE

## 2023-04-14 PROCEDURE — 2580000003 HC RX 258

## 2023-04-14 PROCEDURE — 6370000000 HC RX 637 (ALT 250 FOR IP)

## 2023-04-14 PROCEDURE — 99223 1ST HOSP IP/OBS HIGH 75: CPT | Performed by: INTERNAL MEDICINE

## 2023-04-14 PROCEDURE — 1200000000 HC SEMI PRIVATE

## 2023-04-14 PROCEDURE — 83735 ASSAY OF MAGNESIUM: CPT

## 2023-04-14 PROCEDURE — 36415 COLL VENOUS BLD VENIPUNCTURE: CPT

## 2023-04-14 PROCEDURE — 6370000000 HC RX 637 (ALT 250 FOR IP): Performed by: INTERNAL MEDICINE

## 2023-04-14 PROCEDURE — 99232 SBSQ HOSP IP/OBS MODERATE 35: CPT | Performed by: NURSE PRACTITIONER

## 2023-04-14 PROCEDURE — 80202 ASSAY OF VANCOMYCIN: CPT

## 2023-04-14 PROCEDURE — 85025 COMPLETE CBC W/AUTO DIFF WBC: CPT

## 2023-04-14 PROCEDURE — 2580000003 HC RX 258: Performed by: INTERNAL MEDICINE

## 2023-04-14 PROCEDURE — 80048 BASIC METABOLIC PNL TOTAL CA: CPT

## 2023-04-14 PROCEDURE — 2500000003 HC RX 250 WO HCPCS

## 2023-04-14 RX ORDER — SODIUM CHLORIDE 9 MG/ML
INJECTION, SOLUTION INTRAVENOUS CONTINUOUS
Status: ACTIVE | OUTPATIENT
Start: 2023-04-14 | End: 2023-04-14

## 2023-04-14 RX ORDER — MAGNESIUM SULFATE IN WATER 40 MG/ML
2000 INJECTION, SOLUTION INTRAVENOUS ONCE
Status: COMPLETED | OUTPATIENT
Start: 2023-04-14 | End: 2023-04-14

## 2023-04-14 RX ORDER — LIDOCAINE HYDROCHLORIDE AND EPINEPHRINE 10; 10 MG/ML; UG/ML
20 INJECTION, SOLUTION INFILTRATION; PERINEURAL ONCE
Status: DISCONTINUED | OUTPATIENT
Start: 2023-04-14 | End: 2023-05-03 | Stop reason: HOSPADM

## 2023-04-14 RX ADMIN — ENOXAPARIN SODIUM 30 MG: 100 INJECTION SUBCUTANEOUS at 21:22

## 2023-04-14 RX ADMIN — SODIUM CHLORIDE, PRESERVATIVE FREE 10 ML: 5 INJECTION INTRAVENOUS at 21:23

## 2023-04-14 RX ADMIN — ENOXAPARIN SODIUM 30 MG: 100 INJECTION SUBCUTANEOUS at 10:09

## 2023-04-14 RX ADMIN — VANCOMYCIN HYDROCHLORIDE 1250 MG: 10 INJECTION, POWDER, LYOPHILIZED, FOR SOLUTION INTRAVENOUS at 14:42

## 2023-04-14 RX ADMIN — METRONIDAZOLE 500 MG: 500 INJECTION, SOLUTION INTRAVENOUS at 10:21

## 2023-04-14 RX ADMIN — METRONIDAZOLE 500 MG: 500 INJECTION, SOLUTION INTRAVENOUS at 01:33

## 2023-04-14 RX ADMIN — METOPROLOL TARTRATE 50 MG: 50 TABLET, FILM COATED ORAL at 21:06

## 2023-04-14 RX ADMIN — MAGNESIUM SULFATE HEPTAHYDRATE 2000 MG: 40 INJECTION, SOLUTION INTRAVENOUS at 15:26

## 2023-04-14 RX ADMIN — MICONAZOLE NITRATE: 20 POWDER TOPICAL at 10:15

## 2023-04-14 RX ADMIN — INSULIN GLARGINE 10 UNITS: 100 INJECTION, SOLUTION SUBCUTANEOUS at 21:22

## 2023-04-14 RX ADMIN — AMPICILLIN SODIUM AND SULBACTAM SODIUM 3000 MG: 2; 1 INJECTION, POWDER, FOR SOLUTION INTRAMUSCULAR; INTRAVENOUS at 16:49

## 2023-04-14 RX ADMIN — CEFEPIME 2000 MG: 2 INJECTION, POWDER, FOR SOLUTION INTRAVENOUS at 01:29

## 2023-04-14 RX ADMIN — SODIUM CHLORIDE, PRESERVATIVE FREE 10 ML: 5 INJECTION INTRAVENOUS at 09:00

## 2023-04-14 RX ADMIN — Medication 1 CAPSULE: at 10:09

## 2023-04-14 RX ADMIN — ONDANSETRON 4 MG: 2 INJECTION INTRAMUSCULAR; INTRAVENOUS at 04:44

## 2023-04-14 RX ADMIN — AMPICILLIN SODIUM AND SULBACTAM SODIUM 3000 MG: 2; 1 INJECTION, POWDER, FOR SOLUTION INTRAMUSCULAR; INTRAVENOUS at 21:45

## 2023-04-14 RX ADMIN — METOPROLOL TARTRATE 50 MG: 50 TABLET, FILM COATED ORAL at 10:09

## 2023-04-14 RX ADMIN — SODIUM CHLORIDE: 9 INJECTION, SOLUTION INTRAVENOUS at 10:06

## 2023-04-14 RX ADMIN — CEFEPIME 2000 MG: 2 INJECTION, POWDER, FOR SOLUTION INTRAVENOUS at 10:27

## 2023-04-14 RX ADMIN — MICONAZOLE NITRATE: 20 POWDER TOPICAL at 21:23

## 2023-04-14 RX ADMIN — CLOPIDOGREL BISULFATE 75 MG: 75 TABLET ORAL at 10:09

## 2023-04-14 RX ADMIN — ASPIRIN 81 MG: 81 TABLET, COATED ORAL at 10:09

## 2023-04-14 ASSESSMENT — PAIN DESCRIPTION - PAIN TYPE: TYPE: CHRONIC PAIN

## 2023-04-14 ASSESSMENT — PAIN SCALES - GENERAL
PAINLEVEL_OUTOF10: 0
PAINLEVEL_OUTOF10: 0
PAINLEVEL_OUTOF10: 4
PAINLEVEL_OUTOF10: 2

## 2023-04-14 ASSESSMENT — PAIN DESCRIPTION - LOCATION: LOCATION: BACK

## 2023-04-14 ASSESSMENT — PAIN DESCRIPTION - ONSET: ONSET: ON-GOING

## 2023-04-14 ASSESSMENT — PAIN DESCRIPTION - DESCRIPTORS: DESCRIPTORS: ACHING;DULL

## 2023-04-14 ASSESSMENT — PAIN DESCRIPTION - ORIENTATION: ORIENTATION: MID

## 2023-04-14 ASSESSMENT — PAIN DESCRIPTION - FREQUENCY: FREQUENCY: CONTINUOUS

## 2023-04-14 ASSESSMENT — PAIN - FUNCTIONAL ASSESSMENT: PAIN_FUNCTIONAL_ASSESSMENT: PREVENTS OR INTERFERES SOME ACTIVE ACTIVITIES AND ADLS

## 2023-04-15 LAB
ANION GAP SERPL CALCULATED.3IONS-SCNC: 8 MMOL/L (ref 3–16)
BASOPHILS # BLD: 0 K/UL (ref 0–0.2)
BASOPHILS NFR BLD: 0.4 %
BUN SERPL-MCNC: 14 MG/DL (ref 7–20)
CALCIUM SERPL-MCNC: 8.6 MG/DL (ref 8.3–10.6)
CHLORIDE SERPL-SCNC: 103 MMOL/L (ref 99–110)
CO2 SERPL-SCNC: 30 MMOL/L (ref 21–32)
CREAT SERPL-MCNC: <0.5 MG/DL (ref 0.6–1.2)
DEPRECATED RDW RBC AUTO: 14.8 % (ref 12.4–15.4)
EOSINOPHIL # BLD: 0.2 K/UL (ref 0–0.6)
EOSINOPHIL NFR BLD: 3.2 %
GFR SERPLBLD CREATININE-BSD FMLA CKD-EPI: >60 ML/MIN/{1.73_M2}
GLUCOSE BLD-MCNC: 105 MG/DL (ref 70–99)
GLUCOSE BLD-MCNC: 106 MG/DL (ref 70–99)
GLUCOSE BLD-MCNC: 151 MG/DL (ref 70–99)
GLUCOSE BLD-MCNC: 152 MG/DL (ref 70–99)
GLUCOSE BLD-MCNC: 161 MG/DL (ref 70–99)
GLUCOSE SERPL-MCNC: 110 MG/DL (ref 70–99)
HCT VFR BLD AUTO: 26.4 % (ref 36–48)
HGB BLD-MCNC: 8.5 G/DL (ref 12–16)
LYMPHOCYTES # BLD: 0.7 K/UL (ref 1–5.1)
LYMPHOCYTES NFR BLD: 11 %
MAGNESIUM SERPL-MCNC: 1.8 MG/DL (ref 1.8–2.4)
MCH RBC QN AUTO: 27 PG (ref 26–34)
MCHC RBC AUTO-ENTMCNC: 32 G/DL (ref 31–36)
MCV RBC AUTO: 84.5 FL (ref 80–100)
MONOCYTES # BLD: 0.3 K/UL (ref 0–1.3)
MONOCYTES NFR BLD: 5.6 %
NEUTROPHILS # BLD: 4.8 K/UL (ref 1.7–7.7)
NEUTROPHILS NFR BLD: 79.8 %
PERFORMED ON: ABNORMAL
PLATELET # BLD AUTO: 278 K/UL (ref 135–450)
PMV BLD AUTO: 6.5 FL (ref 5–10.5)
POTASSIUM SERPL-SCNC: 3.7 MMOL/L (ref 3.5–5.1)
RBC # BLD AUTO: 3.13 M/UL (ref 4–5.2)
SODIUM SERPL-SCNC: 141 MMOL/L (ref 136–145)
WBC # BLD AUTO: 6 K/UL (ref 4–11)

## 2023-04-15 PROCEDURE — 6370000000 HC RX 637 (ALT 250 FOR IP): Performed by: INTERNAL MEDICINE

## 2023-04-15 PROCEDURE — 97530 THERAPEUTIC ACTIVITIES: CPT

## 2023-04-15 PROCEDURE — 6370000000 HC RX 637 (ALT 250 FOR IP)

## 2023-04-15 PROCEDURE — 83735 ASSAY OF MAGNESIUM: CPT

## 2023-04-15 PROCEDURE — 2060000000 HC ICU INTERMEDIATE R&B

## 2023-04-15 PROCEDURE — 2580000003 HC RX 258: Performed by: INTERNAL MEDICINE

## 2023-04-15 PROCEDURE — 36415 COLL VENOUS BLD VENIPUNCTURE: CPT

## 2023-04-15 PROCEDURE — 97162 PT EVAL MOD COMPLEX 30 MIN: CPT

## 2023-04-15 PROCEDURE — 97166 OT EVAL MOD COMPLEX 45 MIN: CPT

## 2023-04-15 PROCEDURE — 2500000003 HC RX 250 WO HCPCS: Performed by: INTERNAL MEDICINE

## 2023-04-15 PROCEDURE — 6360000002 HC RX W HCPCS

## 2023-04-15 PROCEDURE — 2580000003 HC RX 258

## 2023-04-15 PROCEDURE — 6360000002 HC RX W HCPCS: Performed by: INTERNAL MEDICINE

## 2023-04-15 PROCEDURE — 80048 BASIC METABOLIC PNL TOTAL CA: CPT

## 2023-04-15 PROCEDURE — 99232 SBSQ HOSP IP/OBS MODERATE 35: CPT | Performed by: NURSE PRACTITIONER

## 2023-04-15 PROCEDURE — 6370000000 HC RX 637 (ALT 250 FOR IP): Performed by: STUDENT IN AN ORGANIZED HEALTH CARE EDUCATION/TRAINING PROGRAM

## 2023-04-15 PROCEDURE — 0JB70ZZ EXCISION OF BACK SUBCUTANEOUS TISSUE AND FASCIA, OPEN APPROACH: ICD-10-PCS | Performed by: INTERNAL MEDICINE

## 2023-04-15 PROCEDURE — 1200000000 HC SEMI PRIVATE

## 2023-04-15 PROCEDURE — 85025 COMPLETE CBC W/AUTO DIFF WBC: CPT

## 2023-04-15 RX ORDER — DIGOXIN 125 MCG
250 TABLET ORAL
Status: DISCONTINUED | OUTPATIENT
Start: 2023-04-16 | End: 2023-04-15

## 2023-04-15 RX ORDER — DIGOXIN 125 MCG
250 TABLET ORAL
Status: ACTIVE | OUTPATIENT
Start: 2023-04-15 | End: 2023-04-16

## 2023-04-15 RX ORDER — POTASSIUM CHLORIDE 20 MEQ/1
20 TABLET, EXTENDED RELEASE ORAL ONCE
Status: COMPLETED | OUTPATIENT
Start: 2023-04-15 | End: 2023-04-15

## 2023-04-15 RX ORDER — DIGOXIN 125 MCG
500 TABLET ORAL ONCE
Status: COMPLETED | OUTPATIENT
Start: 2023-04-15 | End: 2023-04-15

## 2023-04-15 RX ORDER — MAGNESIUM SULFATE IN WATER 40 MG/ML
2000 INJECTION, SOLUTION INTRAVENOUS ONCE
Status: COMPLETED | OUTPATIENT
Start: 2023-04-15 | End: 2023-04-15

## 2023-04-15 RX ORDER — DILTIAZEM HYDROCHLORIDE 5 MG/ML
10 INJECTION INTRAVENOUS ONCE
Status: DISCONTINUED | OUTPATIENT
Start: 2023-04-15 | End: 2023-04-15

## 2023-04-15 RX ADMIN — AMPICILLIN SODIUM AND SULBACTAM SODIUM 3000 MG: 2; 1 INJECTION, POWDER, FOR SOLUTION INTRAMUSCULAR; INTRAVENOUS at 21:49

## 2023-04-15 RX ADMIN — DIGOXIN 250 MCG: 125 TABLET ORAL at 20:00

## 2023-04-15 RX ADMIN — ENOXAPARIN SODIUM 30 MG: 100 INJECTION SUBCUTANEOUS at 09:33

## 2023-04-15 RX ADMIN — DIGOXIN 500 MCG: 125 TABLET ORAL at 18:36

## 2023-04-15 RX ADMIN — AMPICILLIN SODIUM AND SULBACTAM SODIUM 3000 MG: 2; 1 INJECTION, POWDER, FOR SOLUTION INTRAMUSCULAR; INTRAVENOUS at 04:33

## 2023-04-15 RX ADMIN — MAGNESIUM SULFATE HEPTAHYDRATE 2000 MG: 40 INJECTION, SOLUTION INTRAVENOUS at 15:07

## 2023-04-15 RX ADMIN — AMPICILLIN SODIUM AND SULBACTAM SODIUM 3000 MG: 2; 1 INJECTION, POWDER, FOR SOLUTION INTRAMUSCULAR; INTRAVENOUS at 09:37

## 2023-04-15 RX ADMIN — INSULIN GLARGINE 10 UNITS: 100 INJECTION, SOLUTION SUBCUTANEOUS at 21:45

## 2023-04-15 RX ADMIN — Medication 1 CAPSULE: at 09:33

## 2023-04-15 RX ADMIN — MICONAZOLE NITRATE: 20 POWDER TOPICAL at 09:33

## 2023-04-15 RX ADMIN — AMPICILLIN SODIUM AND SULBACTAM SODIUM 3000 MG: 2; 1 INJECTION, POWDER, FOR SOLUTION INTRAMUSCULAR; INTRAVENOUS at 17:44

## 2023-04-15 RX ADMIN — SODIUM CHLORIDE 25 ML: 9 INJECTION, SOLUTION INTRAVENOUS at 21:47

## 2023-04-15 RX ADMIN — METOPROLOL TARTRATE 50 MG: 50 TABLET, FILM COATED ORAL at 21:49

## 2023-04-15 RX ADMIN — COLLAGENASE SANTYL: 250 OINTMENT TOPICAL at 10:47

## 2023-04-15 RX ADMIN — SODIUM CHLORIDE, PRESERVATIVE FREE 10 ML: 5 INJECTION INTRAVENOUS at 22:27

## 2023-04-15 RX ADMIN — ENOXAPARIN SODIUM 30 MG: 100 INJECTION SUBCUTANEOUS at 21:50

## 2023-04-15 RX ADMIN — METOPROLOL TARTRATE 50 MG: 50 TABLET, FILM COATED ORAL at 09:33

## 2023-04-15 RX ADMIN — CLOPIDOGREL BISULFATE 75 MG: 75 TABLET ORAL at 09:33

## 2023-04-15 RX ADMIN — ASPIRIN 81 MG: 81 TABLET, COATED ORAL at 09:33

## 2023-04-15 RX ADMIN — POTASSIUM CHLORIDE 20 MEQ: 1500 TABLET, EXTENDED RELEASE ORAL at 15:04

## 2023-04-16 LAB
BACTERIA BLD CULT ORG #2: NORMAL
BACTERIA BLD CULT: NORMAL
BASOPHILS # BLD: 0 K/UL (ref 0–0.2)
BASOPHILS NFR BLD: 0.4 %
DEPRECATED RDW RBC AUTO: 15.2 % (ref 12.4–15.4)
EOSINOPHIL # BLD: 0.2 K/UL (ref 0–0.6)
EOSINOPHIL NFR BLD: 3.6 %
GLUCOSE BLD-MCNC: 101 MG/DL (ref 70–99)
GLUCOSE BLD-MCNC: 103 MG/DL (ref 70–99)
GLUCOSE BLD-MCNC: 128 MG/DL (ref 70–99)
GLUCOSE BLD-MCNC: 151 MG/DL (ref 70–99)
HCT VFR BLD AUTO: 25.6 % (ref 36–48)
HGB BLD-MCNC: 8.2 G/DL (ref 12–16)
LYMPHOCYTES # BLD: 0.9 K/UL (ref 1–5.1)
LYMPHOCYTES NFR BLD: 17.1 %
MAGNESIUM SERPL-MCNC: 1.8 MG/DL (ref 1.8–2.4)
MCH RBC QN AUTO: 26.8 PG (ref 26–34)
MCHC RBC AUTO-ENTMCNC: 32 G/DL (ref 31–36)
MCV RBC AUTO: 83.6 FL (ref 80–100)
MONOCYTES # BLD: 0.4 K/UL (ref 0–1.3)
MONOCYTES NFR BLD: 7.6 %
NEUTROPHILS # BLD: 3.7 K/UL (ref 1.7–7.7)
NEUTROPHILS NFR BLD: 71.3 %
PERFORMED ON: ABNORMAL
PLATELET # BLD AUTO: 284 K/UL (ref 135–450)
PMV BLD AUTO: 6.6 FL (ref 5–10.5)
RBC # BLD AUTO: 3.06 M/UL (ref 4–5.2)
WBC # BLD AUTO: 5.1 K/UL (ref 4–11)

## 2023-04-16 PROCEDURE — 2060000000 HC ICU INTERMEDIATE R&B

## 2023-04-16 PROCEDURE — 6360000002 HC RX W HCPCS

## 2023-04-16 PROCEDURE — 2580000003 HC RX 258

## 2023-04-16 PROCEDURE — 2580000003 HC RX 258: Performed by: INTERNAL MEDICINE

## 2023-04-16 PROCEDURE — 1200000000 HC SEMI PRIVATE

## 2023-04-16 PROCEDURE — 83735 ASSAY OF MAGNESIUM: CPT

## 2023-04-16 PROCEDURE — 2500000003 HC RX 250 WO HCPCS

## 2023-04-16 PROCEDURE — 6370000000 HC RX 637 (ALT 250 FOR IP)

## 2023-04-16 PROCEDURE — 6360000002 HC RX W HCPCS: Performed by: INTERNAL MEDICINE

## 2023-04-16 PROCEDURE — 99232 SBSQ HOSP IP/OBS MODERATE 35: CPT | Performed by: NURSE PRACTITIONER

## 2023-04-16 PROCEDURE — 85025 COMPLETE CBC W/AUTO DIFF WBC: CPT

## 2023-04-16 PROCEDURE — 6370000000 HC RX 637 (ALT 250 FOR IP): Performed by: STUDENT IN AN ORGANIZED HEALTH CARE EDUCATION/TRAINING PROGRAM

## 2023-04-16 PROCEDURE — 36415 COLL VENOUS BLD VENIPUNCTURE: CPT

## 2023-04-16 PROCEDURE — 6370000000 HC RX 637 (ALT 250 FOR IP): Performed by: INTERNAL MEDICINE

## 2023-04-16 RX ADMIN — Medication 1 CAPSULE: at 09:03

## 2023-04-16 RX ADMIN — COLLAGENASE SANTYL: 250 OINTMENT TOPICAL at 09:00

## 2023-04-16 RX ADMIN — SODIUM CHLORIDE 25 ML: 9 INJECTION, SOLUTION INTRAVENOUS at 04:05

## 2023-04-16 RX ADMIN — AMPICILLIN SODIUM AND SULBACTAM SODIUM 3000 MG: 2; 1 INJECTION, POWDER, FOR SOLUTION INTRAMUSCULAR; INTRAVENOUS at 09:08

## 2023-04-16 RX ADMIN — CLOPIDOGREL BISULFATE 75 MG: 75 TABLET ORAL at 09:03

## 2023-04-16 RX ADMIN — POLYETHYLENE GLYCOL 3350 17 G: 17 POWDER, FOR SOLUTION ORAL at 09:03

## 2023-04-16 RX ADMIN — SODIUM CHLORIDE, PRESERVATIVE FREE 10 ML: 5 INJECTION INTRAVENOUS at 22:09

## 2023-04-16 RX ADMIN — MICONAZOLE NITRATE: 20 POWDER TOPICAL at 09:04

## 2023-04-16 RX ADMIN — AMPICILLIN SODIUM AND SULBACTAM SODIUM 3000 MG: 2; 1 INJECTION, POWDER, FOR SOLUTION INTRAMUSCULAR; INTRAVENOUS at 04:06

## 2023-04-16 RX ADMIN — METOPROLOL TARTRATE 50 MG: 50 TABLET, FILM COATED ORAL at 09:03

## 2023-04-16 RX ADMIN — ASPIRIN 81 MG: 81 TABLET, COATED ORAL at 09:03

## 2023-04-16 RX ADMIN — ENOXAPARIN SODIUM 30 MG: 100 INJECTION SUBCUTANEOUS at 21:53

## 2023-04-16 RX ADMIN — INSULIN GLARGINE 10 UNITS: 100 INJECTION, SOLUTION SUBCUTANEOUS at 21:51

## 2023-04-16 RX ADMIN — AMPICILLIN SODIUM AND SULBACTAM SODIUM 3000 MG: 2; 1 INJECTION, POWDER, FOR SOLUTION INTRAMUSCULAR; INTRAVENOUS at 21:49

## 2023-04-16 RX ADMIN — AMPICILLIN SODIUM AND SULBACTAM SODIUM 3000 MG: 2; 1 INJECTION, POWDER, FOR SOLUTION INTRAMUSCULAR; INTRAVENOUS at 15:50

## 2023-04-16 RX ADMIN — SODIUM CHLORIDE 25 ML: 9 INJECTION, SOLUTION INTRAVENOUS at 21:48

## 2023-04-16 RX ADMIN — SODIUM CHLORIDE, PRESERVATIVE FREE 10 ML: 5 INJECTION INTRAVENOUS at 09:03

## 2023-04-16 RX ADMIN — MICONAZOLE NITRATE: 20 POWDER TOPICAL at 21:53

## 2023-04-16 RX ADMIN — ENOXAPARIN SODIUM 30 MG: 100 INJECTION SUBCUTANEOUS at 09:03

## 2023-04-16 RX ADMIN — METOPROLOL TARTRATE 50 MG: 50 TABLET, FILM COATED ORAL at 21:53

## 2023-04-16 RX ADMIN — MICONAZOLE NITRATE: 20 POWDER TOPICAL at 05:52

## 2023-04-16 ASSESSMENT — PAIN SCALES - GENERAL
PAINLEVEL_OUTOF10: 0
PAINLEVEL_OUTOF10: 0

## 2023-04-17 PROBLEM — Z51.5 ENCOUNTER FOR PALLIATIVE CARE: Status: ACTIVE | Noted: 2023-04-17

## 2023-04-17 PROBLEM — L02.91: Status: ACTIVE | Noted: 2023-04-17

## 2023-04-17 LAB
ALBUMIN SERPL-MCNC: 2.3 G/DL (ref 3.4–5)
ANION GAP SERPL CALCULATED.3IONS-SCNC: 7 MMOL/L (ref 3–16)
BACTERIA SPEC AEROBE CULT: ABNORMAL
BACTERIA SPEC ANAEROBE CULT: ABNORMAL
BASOPHILS # BLD: 0 K/UL (ref 0–0.2)
BASOPHILS NFR BLD: 0.6 %
BUN SERPL-MCNC: 12 MG/DL (ref 7–20)
CALCIUM SERPL-MCNC: 8.7 MG/DL (ref 8.3–10.6)
CHLORIDE SERPL-SCNC: 105 MMOL/L (ref 99–110)
CO2 SERPL-SCNC: 30 MMOL/L (ref 21–32)
CREAT SERPL-MCNC: <0.5 MG/DL (ref 0.6–1.2)
CRP SERPL-MCNC: 60.2 MG/L (ref 0–5.1)
DEPRECATED RDW RBC AUTO: 15 % (ref 12.4–15.4)
DIGOXIN SERPL-MCNC: <0.3 NG/ML (ref 0.8–2)
EOSINOPHIL # BLD: 0.2 K/UL (ref 0–0.6)
EOSINOPHIL NFR BLD: 3 %
ERYTHROCYTE [SEDIMENTATION RATE] IN BLOOD BY WESTERGREN METHOD: 56 MM/HR (ref 0–30)
GFR SERPLBLD CREATININE-BSD FMLA CKD-EPI: >60 ML/MIN/{1.73_M2}
GLUCOSE BLD-MCNC: 110 MG/DL (ref 70–99)
GLUCOSE BLD-MCNC: 116 MG/DL (ref 70–99)
GLUCOSE BLD-MCNC: 123 MG/DL (ref 70–99)
GLUCOSE BLD-MCNC: 96 MG/DL (ref 70–99)
GLUCOSE SERPL-MCNC: 92 MG/DL (ref 70–99)
GRAM STN SPEC: ABNORMAL
HCT VFR BLD AUTO: 25.6 % (ref 36–48)
HGB BLD-MCNC: 8.5 G/DL (ref 12–16)
LYMPHOCYTES # BLD: 0.9 K/UL (ref 1–5.1)
LYMPHOCYTES NFR BLD: 18 %
MAGNESIUM SERPL-MCNC: 1.7 MG/DL (ref 1.8–2.4)
MCH RBC QN AUTO: 27.6 PG (ref 26–34)
MCHC RBC AUTO-ENTMCNC: 33.2 G/DL (ref 31–36)
MCV RBC AUTO: 83.1 FL (ref 80–100)
MONOCYTES # BLD: 0.4 K/UL (ref 0–1.3)
MONOCYTES NFR BLD: 7.3 %
NEUTROPHILS # BLD: 3.6 K/UL (ref 1.7–7.7)
NEUTROPHILS NFR BLD: 71.1 %
ORGANISM: ABNORMAL
PERFORMED ON: ABNORMAL
PERFORMED ON: NORMAL
PHOSPHATE SERPL-MCNC: 3.3 MG/DL (ref 2.5–4.9)
PLATELET # BLD AUTO: 289 K/UL (ref 135–450)
PMV BLD AUTO: 6.8 FL (ref 5–10.5)
POTASSIUM SERPL-SCNC: 4 MMOL/L (ref 3.5–5.1)
RBC # BLD AUTO: 3.09 M/UL (ref 4–5.2)
SODIUM SERPL-SCNC: 142 MMOL/L (ref 136–145)
WBC # BLD AUTO: 5.1 K/UL (ref 4–11)

## 2023-04-17 PROCEDURE — 85652 RBC SED RATE AUTOMATED: CPT

## 2023-04-17 PROCEDURE — 97110 THERAPEUTIC EXERCISES: CPT

## 2023-04-17 PROCEDURE — 6360000002 HC RX W HCPCS

## 2023-04-17 PROCEDURE — 99221 1ST HOSP IP/OBS SF/LOW 40: CPT | Performed by: NURSE PRACTITIONER

## 2023-04-17 PROCEDURE — 97530 THERAPEUTIC ACTIVITIES: CPT

## 2023-04-17 PROCEDURE — 6360000002 HC RX W HCPCS: Performed by: INTERNAL MEDICINE

## 2023-04-17 PROCEDURE — 2060000000 HC ICU INTERMEDIATE R&B

## 2023-04-17 PROCEDURE — 1200000000 HC SEMI PRIVATE

## 2023-04-17 PROCEDURE — 36415 COLL VENOUS BLD VENIPUNCTURE: CPT

## 2023-04-17 PROCEDURE — 2580000003 HC RX 258

## 2023-04-17 PROCEDURE — 83735 ASSAY OF MAGNESIUM: CPT

## 2023-04-17 PROCEDURE — 6370000000 HC RX 637 (ALT 250 FOR IP)

## 2023-04-17 PROCEDURE — 2580000003 HC RX 258: Performed by: INTERNAL MEDICINE

## 2023-04-17 PROCEDURE — 86140 C-REACTIVE PROTEIN: CPT

## 2023-04-17 PROCEDURE — 85025 COMPLETE CBC W/AUTO DIFF WBC: CPT

## 2023-04-17 PROCEDURE — 6370000000 HC RX 637 (ALT 250 FOR IP): Performed by: INTERNAL MEDICINE

## 2023-04-17 PROCEDURE — 99232 SBSQ HOSP IP/OBS MODERATE 35: CPT | Performed by: INTERNAL MEDICINE

## 2023-04-17 PROCEDURE — 80069 RENAL FUNCTION PANEL: CPT

## 2023-04-17 PROCEDURE — 80162 ASSAY OF DIGOXIN TOTAL: CPT

## 2023-04-17 PROCEDURE — 97535 SELF CARE MNGMENT TRAINING: CPT

## 2023-04-17 PROCEDURE — 6370000000 HC RX 637 (ALT 250 FOR IP): Performed by: STUDENT IN AN ORGANIZED HEALTH CARE EDUCATION/TRAINING PROGRAM

## 2023-04-17 RX ORDER — LOSARTAN POTASSIUM 25 MG/1
25 TABLET ORAL DAILY
Status: DISCONTINUED | OUTPATIENT
Start: 2023-04-17 | End: 2023-04-27

## 2023-04-17 RX ORDER — MAGNESIUM SULFATE 1 G/100ML
1000 INJECTION INTRAVENOUS ONCE
Status: COMPLETED | OUTPATIENT
Start: 2023-04-17 | End: 2023-04-17

## 2023-04-17 RX ORDER — MAGNESIUM SULFATE IN WATER 40 MG/ML
2000 INJECTION, SOLUTION INTRAVENOUS ONCE
Status: COMPLETED | OUTPATIENT
Start: 2023-04-17 | End: 2023-04-17

## 2023-04-17 RX ORDER — DIGOXIN 125 MCG
250 TABLET ORAL ONCE
Status: COMPLETED | OUTPATIENT
Start: 2023-04-17 | End: 2023-04-17

## 2023-04-17 RX ADMIN — SODIUM CHLORIDE: 9 INJECTION, SOLUTION INTRAVENOUS at 08:26

## 2023-04-17 RX ADMIN — SODIUM CHLORIDE 25 ML: 9 INJECTION, SOLUTION INTRAVENOUS at 03:55

## 2023-04-17 RX ADMIN — MICONAZOLE NITRATE: 20 POWDER TOPICAL at 20:31

## 2023-04-17 RX ADMIN — AMPICILLIN SODIUM AND SULBACTAM SODIUM 3000 MG: 2; 1 INJECTION, POWDER, FOR SOLUTION INTRAMUSCULAR; INTRAVENOUS at 03:56

## 2023-04-17 RX ADMIN — ENOXAPARIN SODIUM 30 MG: 100 INJECTION SUBCUTANEOUS at 20:33

## 2023-04-17 RX ADMIN — MAGNESIUM SULFATE HEPTAHYDRATE 1000 MG: 1 INJECTION, SOLUTION INTRAVENOUS at 10:29

## 2023-04-17 RX ADMIN — DIGOXIN 250 MCG: 125 TABLET ORAL at 21:36

## 2023-04-17 RX ADMIN — CLOPIDOGREL BISULFATE 75 MG: 75 TABLET ORAL at 08:23

## 2023-04-17 RX ADMIN — AMPICILLIN SODIUM AND SULBACTAM SODIUM 3000 MG: 2; 1 INJECTION, POWDER, FOR SOLUTION INTRAMUSCULAR; INTRAVENOUS at 11:44

## 2023-04-17 RX ADMIN — METOPROLOL TARTRATE 50 MG: 50 TABLET, FILM COATED ORAL at 20:34

## 2023-04-17 RX ADMIN — AMPICILLIN SODIUM AND SULBACTAM SODIUM 3000 MG: 2; 1 INJECTION, POWDER, FOR SOLUTION INTRAMUSCULAR; INTRAVENOUS at 16:25

## 2023-04-17 RX ADMIN — MICONAZOLE NITRATE: 20 POWDER TOPICAL at 08:32

## 2023-04-17 RX ADMIN — METOPROLOL TARTRATE 50 MG: 50 TABLET, FILM COATED ORAL at 08:23

## 2023-04-17 RX ADMIN — Medication 1 CAPSULE: at 08:22

## 2023-04-17 RX ADMIN — AMPICILLIN SODIUM AND SULBACTAM SODIUM 3000 MG: 2; 1 INJECTION, POWDER, FOR SOLUTION INTRAMUSCULAR; INTRAVENOUS at 21:39

## 2023-04-17 RX ADMIN — INSULIN GLARGINE 10 UNITS: 100 INJECTION, SOLUTION SUBCUTANEOUS at 20:43

## 2023-04-17 RX ADMIN — ASPIRIN 81 MG: 81 TABLET, COATED ORAL at 08:23

## 2023-04-17 RX ADMIN — SODIUM CHLORIDE, PRESERVATIVE FREE 10 ML: 5 INJECTION INTRAVENOUS at 20:44

## 2023-04-17 RX ADMIN — SODIUM CHLORIDE, PRESERVATIVE FREE 10 ML: 5 INJECTION INTRAVENOUS at 08:23

## 2023-04-17 RX ADMIN — ENOXAPARIN SODIUM 30 MG: 100 INJECTION SUBCUTANEOUS at 08:23

## 2023-04-17 RX ADMIN — MAGNESIUM SULFATE HEPTAHYDRATE 2000 MG: 40 INJECTION, SOLUTION INTRAVENOUS at 08:28

## 2023-04-17 ASSESSMENT — PAIN SCALES - GENERAL: PAINLEVEL_OUTOF10: 0

## 2023-04-18 LAB
ALBUMIN SERPL-MCNC: 2.4 G/DL (ref 3.4–5)
ANION GAP SERPL CALCULATED.3IONS-SCNC: 11 MMOL/L (ref 3–16)
BASOPHILS # BLD: 0.1 K/UL (ref 0–0.2)
BASOPHILS NFR BLD: 1.4 %
BUN SERPL-MCNC: 10 MG/DL (ref 7–20)
CALCIUM SERPL-MCNC: 8.6 MG/DL (ref 8.3–10.6)
CHLORIDE SERPL-SCNC: 106 MMOL/L (ref 99–110)
CO2 SERPL-SCNC: 25 MMOL/L (ref 21–32)
CREAT SERPL-MCNC: <0.5 MG/DL (ref 0.6–1.2)
DEPRECATED RDW RBC AUTO: 16 % (ref 12.4–15.4)
EOSINOPHIL # BLD: 0.1 K/UL (ref 0–0.6)
EOSINOPHIL NFR BLD: 1 %
GFR SERPLBLD CREATININE-BSD FMLA CKD-EPI: >60 ML/MIN/{1.73_M2}
GLUCOSE BLD-MCNC: 102 MG/DL (ref 70–99)
GLUCOSE BLD-MCNC: 109 MG/DL (ref 70–99)
GLUCOSE BLD-MCNC: 114 MG/DL (ref 70–99)
GLUCOSE BLD-MCNC: 157 MG/DL (ref 70–99)
GLUCOSE SERPL-MCNC: 96 MG/DL (ref 70–99)
HCT VFR BLD AUTO: 27.5 % (ref 36–48)
HGB BLD-MCNC: 8.9 G/DL (ref 12–16)
LYMPHOCYTES # BLD: 1 K/UL (ref 1–5.1)
LYMPHOCYTES NFR BLD: 16.1 %
MAGNESIUM SERPL-MCNC: 2 MG/DL (ref 1.8–2.4)
MCH RBC QN AUTO: 27.2 PG (ref 26–34)
MCHC RBC AUTO-ENTMCNC: 32.4 G/DL (ref 31–36)
MCV RBC AUTO: 84 FL (ref 80–100)
MONOCYTES # BLD: 0.4 K/UL (ref 0–1.3)
MONOCYTES NFR BLD: 6.4 %
NEUTROPHILS # BLD: 4.6 K/UL (ref 1.7–7.7)
NEUTROPHILS NFR BLD: 75.1 %
PERFORMED ON: ABNORMAL
PHOSPHATE SERPL-MCNC: 3.5 MG/DL (ref 2.5–4.9)
PLATELET # BLD AUTO: 324 K/UL (ref 135–450)
PMV BLD AUTO: 6.7 FL (ref 5–10.5)
POTASSIUM SERPL-SCNC: 4.3 MMOL/L (ref 3.5–5.1)
PROCALCITONIN SERPL IA-MCNC: 0.06 NG/ML (ref 0–0.15)
RBC # BLD AUTO: 3.28 M/UL (ref 4–5.2)
REASON FOR REJECTION: NORMAL
REJECTED TEST: NORMAL
SODIUM SERPL-SCNC: 142 MMOL/L (ref 136–145)
WBC # BLD AUTO: 6.1 K/UL (ref 4–11)

## 2023-04-18 PROCEDURE — 1200000000 HC SEMI PRIVATE

## 2023-04-18 PROCEDURE — 87040 BLOOD CULTURE FOR BACTERIA: CPT

## 2023-04-18 PROCEDURE — 85025 COMPLETE CBC W/AUTO DIFF WBC: CPT

## 2023-04-18 PROCEDURE — 80069 RENAL FUNCTION PANEL: CPT

## 2023-04-18 PROCEDURE — 6370000000 HC RX 637 (ALT 250 FOR IP): Performed by: INTERNAL MEDICINE

## 2023-04-18 PROCEDURE — 97530 THERAPEUTIC ACTIVITIES: CPT

## 2023-04-18 PROCEDURE — 93005 ELECTROCARDIOGRAM TRACING: CPT

## 2023-04-18 PROCEDURE — 99232 SBSQ HOSP IP/OBS MODERATE 35: CPT | Performed by: INTERNAL MEDICINE

## 2023-04-18 PROCEDURE — 6360000002 HC RX W HCPCS: Performed by: INTERNAL MEDICINE

## 2023-04-18 PROCEDURE — 2060000000 HC ICU INTERMEDIATE R&B

## 2023-04-18 PROCEDURE — 2580000003 HC RX 258

## 2023-04-18 PROCEDURE — 6370000000 HC RX 637 (ALT 250 FOR IP)

## 2023-04-18 PROCEDURE — 6360000002 HC RX W HCPCS

## 2023-04-18 PROCEDURE — 6370000000 HC RX 637 (ALT 250 FOR IP): Performed by: STUDENT IN AN ORGANIZED HEALTH CARE EDUCATION/TRAINING PROGRAM

## 2023-04-18 PROCEDURE — 97535 SELF CARE MNGMENT TRAINING: CPT

## 2023-04-18 PROCEDURE — 84145 PROCALCITONIN (PCT): CPT

## 2023-04-18 PROCEDURE — 36415 COLL VENOUS BLD VENIPUNCTURE: CPT

## 2023-04-18 PROCEDURE — 83735 ASSAY OF MAGNESIUM: CPT

## 2023-04-18 PROCEDURE — 2580000003 HC RX 258: Performed by: INTERNAL MEDICINE

## 2023-04-18 RX ADMIN — CLOPIDOGREL BISULFATE 75 MG: 75 TABLET ORAL at 09:33

## 2023-04-18 RX ADMIN — ENOXAPARIN SODIUM 30 MG: 100 INJECTION SUBCUTANEOUS at 20:24

## 2023-04-18 RX ADMIN — MICONAZOLE NITRATE: 20 POWDER TOPICAL at 09:35

## 2023-04-18 RX ADMIN — SODIUM CHLORIDE, PRESERVATIVE FREE 10 ML: 5 INJECTION INTRAVENOUS at 20:24

## 2023-04-18 RX ADMIN — LOSARTAN POTASSIUM 25 MG: 25 TABLET, FILM COATED ORAL at 09:33

## 2023-04-18 RX ADMIN — AMPICILLIN SODIUM AND SULBACTAM SODIUM 3000 MG: 2; 1 INJECTION, POWDER, FOR SOLUTION INTRAMUSCULAR; INTRAVENOUS at 21:55

## 2023-04-18 RX ADMIN — MICONAZOLE NITRATE: 20 POWDER TOPICAL at 20:23

## 2023-04-18 RX ADMIN — Medication 1 CAPSULE: at 09:33

## 2023-04-18 RX ADMIN — SODIUM CHLORIDE, PRESERVATIVE FREE 10 ML: 5 INJECTION INTRAVENOUS at 09:34

## 2023-04-18 RX ADMIN — SODIUM CHLORIDE 25 ML: 9 INJECTION, SOLUTION INTRAVENOUS at 21:54

## 2023-04-18 RX ADMIN — ASPIRIN 81 MG: 81 TABLET, COATED ORAL at 09:33

## 2023-04-18 RX ADMIN — AMPICILLIN SODIUM AND SULBACTAM SODIUM 3000 MG: 2; 1 INJECTION, POWDER, FOR SOLUTION INTRAMUSCULAR; INTRAVENOUS at 09:27

## 2023-04-18 RX ADMIN — COLLAGENASE SANTYL: 250 OINTMENT TOPICAL at 09:35

## 2023-04-18 RX ADMIN — ENOXAPARIN SODIUM 30 MG: 100 INJECTION SUBCUTANEOUS at 09:33

## 2023-04-18 RX ADMIN — ACETAMINOPHEN 650 MG: 325 TABLET ORAL at 03:29

## 2023-04-18 RX ADMIN — AMPICILLIN SODIUM AND SULBACTAM SODIUM 3000 MG: 2; 1 INJECTION, POWDER, FOR SOLUTION INTRAMUSCULAR; INTRAVENOUS at 04:06

## 2023-04-18 RX ADMIN — AMPICILLIN SODIUM AND SULBACTAM SODIUM 3000 MG: 2; 1 INJECTION, POWDER, FOR SOLUTION INTRAMUSCULAR; INTRAVENOUS at 17:46

## 2023-04-18 RX ADMIN — INSULIN GLARGINE 10 UNITS: 100 INJECTION, SOLUTION SUBCUTANEOUS at 20:22

## 2023-04-18 ASSESSMENT — PAIN SCALES - GENERAL
PAINLEVEL_OUTOF10: 0

## 2023-04-19 LAB
ALBUMIN SERPL-MCNC: 2.4 G/DL (ref 3.4–5)
ALBUMIN SERPL-MCNC: 2.4 G/DL (ref 3.4–5)
ANION GAP SERPL CALCULATED.3IONS-SCNC: 11 MMOL/L (ref 3–16)
ANION GAP SERPL CALCULATED.3IONS-SCNC: 6 MMOL/L (ref 3–16)
BASOPHILS # BLD: 0 K/UL (ref 0–0.2)
BASOPHILS NFR BLD: 0.4 %
BUN SERPL-MCNC: 10 MG/DL (ref 7–20)
BUN SERPL-MCNC: 10 MG/DL (ref 7–20)
CALCIUM SERPL-MCNC: 8.3 MG/DL (ref 8.3–10.6)
CALCIUM SERPL-MCNC: 8.4 MG/DL (ref 8.3–10.6)
CHLORIDE SERPL-SCNC: 103 MMOL/L (ref 99–110)
CHLORIDE SERPL-SCNC: 105 MMOL/L (ref 99–110)
CO2 SERPL-SCNC: 24 MMOL/L (ref 21–32)
CO2 SERPL-SCNC: 29 MMOL/L (ref 21–32)
CREAT SERPL-MCNC: <0.5 MG/DL (ref 0.6–1.2)
CREAT SERPL-MCNC: <0.5 MG/DL (ref 0.6–1.2)
DEPRECATED RDW RBC AUTO: 16.1 % (ref 12.4–15.4)
EKG ATRIAL RATE: 159 BPM
EKG DIAGNOSIS: NORMAL
EKG Q-T INTERVAL: 362 MS
EKG QRS DURATION: 96 MS
EKG QTC CALCULATION (BAZETT): 515 MS
EKG R AXIS: -30 DEGREES
EKG T AXIS: 214 DEGREES
EKG VENTRICULAR RATE: 122 BPM
EOSINOPHIL # BLD: 0.1 K/UL (ref 0–0.6)
EOSINOPHIL NFR BLD: 2.4 %
GFR SERPLBLD CREATININE-BSD FMLA CKD-EPI: >60 ML/MIN/{1.73_M2}
GFR SERPLBLD CREATININE-BSD FMLA CKD-EPI: >60 ML/MIN/{1.73_M2}
GLUCOSE BLD-MCNC: 101 MG/DL (ref 70–99)
GLUCOSE BLD-MCNC: 124 MG/DL (ref 70–99)
GLUCOSE BLD-MCNC: 129 MG/DL (ref 70–99)
GLUCOSE BLD-MCNC: 96 MG/DL (ref 70–99)
GLUCOSE SERPL-MCNC: 113 MG/DL (ref 70–99)
GLUCOSE SERPL-MCNC: 124 MG/DL (ref 70–99)
HCT VFR BLD AUTO: 27.6 % (ref 36–48)
HGB BLD-MCNC: 8.7 G/DL (ref 12–16)
LYMPHOCYTES # BLD: 0.9 K/UL (ref 1–5.1)
LYMPHOCYTES NFR BLD: 17.6 %
MAGNESIUM SERPL-MCNC: 1.7 MG/DL (ref 1.8–2.4)
MAGNESIUM SERPL-MCNC: 1.9 MG/DL (ref 1.8–2.4)
MCH RBC QN AUTO: 27.5 PG (ref 26–34)
MCHC RBC AUTO-ENTMCNC: 31.3 G/DL (ref 31–36)
MCV RBC AUTO: 87.8 FL (ref 80–100)
MONOCYTES # BLD: 0.4 K/UL (ref 0–1.3)
MONOCYTES NFR BLD: 7.8 %
NEUTROPHILS # BLD: 3.8 K/UL (ref 1.7–7.7)
NEUTROPHILS NFR BLD: 71.8 %
PERFORMED ON: ABNORMAL
PERFORMED ON: NORMAL
PHOSPHATE SERPL-MCNC: 3.3 MG/DL (ref 2.5–4.9)
PHOSPHATE SERPL-MCNC: 3.9 MG/DL (ref 2.5–4.9)
PLATELET # BLD AUTO: 311 K/UL (ref 135–450)
PMV BLD AUTO: 6.4 FL (ref 5–10.5)
POTASSIUM SERPL-SCNC: 3.7 MMOL/L (ref 3.5–5.1)
POTASSIUM SERPL-SCNC: 4.9 MMOL/L (ref 3.5–5.1)
RBC # BLD AUTO: 3.14 M/UL (ref 4–5.2)
SODIUM SERPL-SCNC: 138 MMOL/L (ref 136–145)
SODIUM SERPL-SCNC: 140 MMOL/L (ref 136–145)
WBC # BLD AUTO: 5.4 K/UL (ref 4–11)

## 2023-04-19 PROCEDURE — 6360000002 HC RX W HCPCS: Performed by: INTERNAL MEDICINE

## 2023-04-19 PROCEDURE — 80069 RENAL FUNCTION PANEL: CPT

## 2023-04-19 PROCEDURE — 2580000003 HC RX 258: Performed by: INTERNAL MEDICINE

## 2023-04-19 PROCEDURE — 93005 ELECTROCARDIOGRAM TRACING: CPT

## 2023-04-19 PROCEDURE — 1200000000 HC SEMI PRIVATE

## 2023-04-19 PROCEDURE — 85025 COMPLETE CBC W/AUTO DIFF WBC: CPT

## 2023-04-19 PROCEDURE — 6360000002 HC RX W HCPCS

## 2023-04-19 PROCEDURE — 2580000003 HC RX 258

## 2023-04-19 PROCEDURE — 6370000000 HC RX 637 (ALT 250 FOR IP): Performed by: INTERNAL MEDICINE

## 2023-04-19 PROCEDURE — 83735 ASSAY OF MAGNESIUM: CPT

## 2023-04-19 PROCEDURE — 6370000000 HC RX 637 (ALT 250 FOR IP)

## 2023-04-19 PROCEDURE — 2060000000 HC ICU INTERMEDIATE R&B

## 2023-04-19 PROCEDURE — 99232 SBSQ HOSP IP/OBS MODERATE 35: CPT | Performed by: INTERNAL MEDICINE

## 2023-04-19 PROCEDURE — 36415 COLL VENOUS BLD VENIPUNCTURE: CPT

## 2023-04-19 PROCEDURE — 6370000000 HC RX 637 (ALT 250 FOR IP): Performed by: STUDENT IN AN ORGANIZED HEALTH CARE EDUCATION/TRAINING PROGRAM

## 2023-04-19 PROCEDURE — 93010 ELECTROCARDIOGRAM REPORT: CPT | Performed by: INTERNAL MEDICINE

## 2023-04-19 RX ORDER — DIGOXIN 0.25 MG/ML
125 INJECTION INTRAMUSCULAR; INTRAVENOUS ONCE
Status: COMPLETED | OUTPATIENT
Start: 2023-04-19 | End: 2023-04-19

## 2023-04-19 RX ORDER — 0.9 % SODIUM CHLORIDE 0.9 %
500 INTRAVENOUS SOLUTION INTRAVENOUS ONCE
Status: COMPLETED | OUTPATIENT
Start: 2023-04-19 | End: 2023-04-19

## 2023-04-19 RX ORDER — MAGNESIUM SULFATE 1 G/100ML
1000 INJECTION INTRAVENOUS ONCE
Status: COMPLETED | OUTPATIENT
Start: 2023-04-19 | End: 2023-04-19

## 2023-04-19 RX ORDER — MAGNESIUM SULFATE IN WATER 40 MG/ML
2000 INJECTION, SOLUTION INTRAVENOUS ONCE
Status: COMPLETED | OUTPATIENT
Start: 2023-04-19 | End: 2023-04-19

## 2023-04-19 RX ORDER — METOPROLOL TARTRATE 5 MG/5ML
2.5 INJECTION INTRAVENOUS ONCE
Status: DISCONTINUED | OUTPATIENT
Start: 2023-04-19 | End: 2023-04-19

## 2023-04-19 RX ADMIN — ENOXAPARIN SODIUM 30 MG: 100 INJECTION SUBCUTANEOUS at 09:00

## 2023-04-19 RX ADMIN — SODIUM CHLORIDE, PRESERVATIVE FREE 10 ML: 5 INJECTION INTRAVENOUS at 22:45

## 2023-04-19 RX ADMIN — LOSARTAN POTASSIUM 25 MG: 25 TABLET, FILM COATED ORAL at 08:59

## 2023-04-19 RX ADMIN — SODIUM CHLORIDE, PRESERVATIVE FREE 10 ML: 5 INJECTION INTRAVENOUS at 09:02

## 2023-04-19 RX ADMIN — MAGNESIUM SULFATE HEPTAHYDRATE 1000 MG: 1 INJECTION, SOLUTION INTRAVENOUS at 11:03

## 2023-04-19 RX ADMIN — DIGOXIN 125 MCG: 0.25 INJECTION INTRAMUSCULAR; INTRAVENOUS at 22:36

## 2023-04-19 RX ADMIN — MAGNESIUM SULFATE HEPTAHYDRATE 2000 MG: 40 INJECTION, SOLUTION INTRAVENOUS at 09:43

## 2023-04-19 RX ADMIN — AMPICILLIN SODIUM AND SULBACTAM SODIUM 3000 MG: 2; 1 INJECTION, POWDER, FOR SOLUTION INTRAMUSCULAR; INTRAVENOUS at 09:06

## 2023-04-19 RX ADMIN — SODIUM CHLORIDE 500 ML: 9 INJECTION, SOLUTION INTRAVENOUS at 18:37

## 2023-04-19 RX ADMIN — MICONAZOLE NITRATE: 20 POWDER TOPICAL at 22:44

## 2023-04-19 RX ADMIN — Medication 1 CAPSULE: at 09:00

## 2023-04-19 RX ADMIN — DIGOXIN 125 MCG: 0.25 INJECTION INTRAMUSCULAR; INTRAVENOUS at 18:43

## 2023-04-19 RX ADMIN — SODIUM CHLORIDE 25 ML: 9 INJECTION, SOLUTION INTRAVENOUS at 22:50

## 2023-04-19 RX ADMIN — SODIUM CHLORIDE 500 ML: 9 INJECTION, SOLUTION INTRAVENOUS at 20:27

## 2023-04-19 RX ADMIN — ONDANSETRON 4 MG: 4 TABLET, ORALLY DISINTEGRATING ORAL at 09:00

## 2023-04-19 RX ADMIN — MICONAZOLE NITRATE: 20 POWDER TOPICAL at 09:16

## 2023-04-19 RX ADMIN — ASPIRIN 81 MG: 81 TABLET, COATED ORAL at 09:00

## 2023-04-19 RX ADMIN — COLLAGENASE SANTYL: 250 OINTMENT TOPICAL at 09:06

## 2023-04-19 RX ADMIN — POTASSIUM CHLORIDE 30 MEQ: 1500 TABLET, EXTENDED RELEASE ORAL at 09:15

## 2023-04-19 RX ADMIN — AMPICILLIN SODIUM AND SULBACTAM SODIUM 3000 MG: 2; 1 INJECTION, POWDER, FOR SOLUTION INTRAMUSCULAR; INTRAVENOUS at 03:51

## 2023-04-19 RX ADMIN — AMPICILLIN SODIUM AND SULBACTAM SODIUM 3000 MG: 2; 1 INJECTION, POWDER, FOR SOLUTION INTRAMUSCULAR; INTRAVENOUS at 22:51

## 2023-04-19 RX ADMIN — CLOPIDOGREL BISULFATE 75 MG: 75 TABLET ORAL at 09:00

## 2023-04-19 RX ADMIN — AMPICILLIN SODIUM AND SULBACTAM SODIUM 3000 MG: 2; 1 INJECTION, POWDER, FOR SOLUTION INTRAMUSCULAR; INTRAVENOUS at 17:17

## 2023-04-19 RX ADMIN — METOPROLOL TARTRATE 50 MG: 50 TABLET, FILM COATED ORAL at 22:53

## 2023-04-19 RX ADMIN — ENOXAPARIN SODIUM 30 MG: 100 INJECTION SUBCUTANEOUS at 22:45

## 2023-04-19 RX ADMIN — INSULIN GLARGINE 10 UNITS: 100 INJECTION, SOLUTION SUBCUTANEOUS at 22:38

## 2023-04-19 ASSESSMENT — PAIN SCALES - GENERAL
PAINLEVEL_OUTOF10: 0

## 2023-04-20 ENCOUNTER — APPOINTMENT (OUTPATIENT)
Dept: MRI IMAGING | Age: 63
DRG: 622 | End: 2023-04-20
Payer: COMMERCIAL

## 2023-04-20 LAB
ALBUMIN SERPL-MCNC: 2.2 G/DL (ref 3.4–5)
ANION GAP SERPL CALCULATED.3IONS-SCNC: 7 MMOL/L (ref 3–16)
BASOPHILS # BLD: 0 K/UL (ref 0–0.2)
BASOPHILS NFR BLD: 0.9 %
BUN SERPL-MCNC: 9 MG/DL (ref 7–20)
CALCIUM SERPL-MCNC: 8.3 MG/DL (ref 8.3–10.6)
CHLORIDE SERPL-SCNC: 103 MMOL/L (ref 99–110)
CO2 SERPL-SCNC: 26 MMOL/L (ref 21–32)
CREAT SERPL-MCNC: <0.5 MG/DL (ref 0.6–1.2)
DEPRECATED RDW RBC AUTO: 15.6 % (ref 12.4–15.4)
EKG ATRIAL RATE: 122 BPM
EKG DIAGNOSIS: NORMAL
EKG Q-T INTERVAL: 332 MS
EKG QRS DURATION: 100 MS
EKG QTC CALCULATION (BAZETT): 498 MS
EKG R AXIS: -1 DEGREES
EKG T AXIS: 192 DEGREES
EKG VENTRICULAR RATE: 135 BPM
EOSINOPHIL # BLD: 0.1 K/UL (ref 0–0.6)
EOSINOPHIL NFR BLD: 2.2 %
GFR SERPLBLD CREATININE-BSD FMLA CKD-EPI: >60 ML/MIN/{1.73_M2}
GLUCOSE BLD-MCNC: 121 MG/DL (ref 70–99)
GLUCOSE BLD-MCNC: 126 MG/DL (ref 70–99)
GLUCOSE BLD-MCNC: 158 MG/DL (ref 70–99)
GLUCOSE BLD-MCNC: 77 MG/DL (ref 70–99)
GLUCOSE SERPL-MCNC: 79 MG/DL (ref 70–99)
HCT VFR BLD AUTO: 26.5 % (ref 36–48)
HGB BLD-MCNC: 8.6 G/DL (ref 12–16)
LV EF: 28 %
LVEF MODALITY: NORMAL
LYMPHOCYTES # BLD: 1.2 K/UL (ref 1–5.1)
LYMPHOCYTES NFR BLD: 21.1 %
MAGNESIUM SERPL-MCNC: 1.9 MG/DL (ref 1.8–2.4)
MCH RBC QN AUTO: 27.2 PG (ref 26–34)
MCHC RBC AUTO-ENTMCNC: 32.4 G/DL (ref 31–36)
MCV RBC AUTO: 84 FL (ref 80–100)
MONOCYTES # BLD: 0.5 K/UL (ref 0–1.3)
MONOCYTES NFR BLD: 8.9 %
NEUTROPHILS # BLD: 3.8 K/UL (ref 1.7–7.7)
NEUTROPHILS NFR BLD: 66.9 %
PERFORMED ON: ABNORMAL
PERFORMED ON: NORMAL
PHOSPHATE SERPL-MCNC: 3.6 MG/DL (ref 2.5–4.9)
PLATELET # BLD AUTO: 323 K/UL (ref 135–450)
PMV BLD AUTO: 6.8 FL (ref 5–10.5)
POTASSIUM SERPL-SCNC: 4.6 MMOL/L (ref 3.5–5.1)
RBC # BLD AUTO: 3.15 M/UL (ref 4–5.2)
SODIUM SERPL-SCNC: 136 MMOL/L (ref 136–145)
WBC # BLD AUTO: 5.6 K/UL (ref 4–11)

## 2023-04-20 PROCEDURE — C8929 TTE W OR WO FOL WCON,DOPPLER: HCPCS

## 2023-04-20 PROCEDURE — 6370000000 HC RX 637 (ALT 250 FOR IP)

## 2023-04-20 PROCEDURE — 1200000000 HC SEMI PRIVATE

## 2023-04-20 PROCEDURE — 80069 RENAL FUNCTION PANEL: CPT

## 2023-04-20 PROCEDURE — 2060000000 HC ICU INTERMEDIATE R&B

## 2023-04-20 PROCEDURE — 93010 ELECTROCARDIOGRAM REPORT: CPT | Performed by: INTERNAL MEDICINE

## 2023-04-20 PROCEDURE — 94761 N-INVAS EAR/PLS OXIMETRY MLT: CPT

## 2023-04-20 PROCEDURE — A9579 GAD-BASE MR CONTRAST NOS,1ML: HCPCS

## 2023-04-20 PROCEDURE — 6360000002 HC RX W HCPCS: Performed by: INTERNAL MEDICINE

## 2023-04-20 PROCEDURE — 6370000000 HC RX 637 (ALT 250 FOR IP): Performed by: INTERNAL MEDICINE

## 2023-04-20 PROCEDURE — 2700000000 HC OXYGEN THERAPY PER DAY

## 2023-04-20 PROCEDURE — 85025 COMPLETE CBC W/AUTO DIFF WBC: CPT

## 2023-04-20 PROCEDURE — 6360000004 HC RX CONTRAST MEDICATION

## 2023-04-20 PROCEDURE — 73723 MRI JOINT LWR EXTR W/O&W/DYE: CPT

## 2023-04-20 PROCEDURE — 83735 ASSAY OF MAGNESIUM: CPT

## 2023-04-20 PROCEDURE — 2580000003 HC RX 258: Performed by: INTERNAL MEDICINE

## 2023-04-20 PROCEDURE — 99232 SBSQ HOSP IP/OBS MODERATE 35: CPT | Performed by: INTERNAL MEDICINE

## 2023-04-20 PROCEDURE — 6360000002 HC RX W HCPCS

## 2023-04-20 PROCEDURE — 36415 COLL VENOUS BLD VENIPUNCTURE: CPT

## 2023-04-20 PROCEDURE — 2580000003 HC RX 258

## 2023-04-20 RX ORDER — MAGNESIUM SULFATE 1 G/100ML
1000 INJECTION INTRAVENOUS ONCE
Status: COMPLETED | OUTPATIENT
Start: 2023-04-20 | End: 2023-04-20

## 2023-04-20 RX ORDER — AMIODARONE HYDROCHLORIDE 200 MG/1
200 TABLET ORAL 2 TIMES DAILY
Status: DISCONTINUED | OUTPATIENT
Start: 2023-04-20 | End: 2023-04-27

## 2023-04-20 RX ORDER — INSULIN GLARGINE 100 [IU]/ML
5 INJECTION, SOLUTION SUBCUTANEOUS NIGHTLY
Status: DISCONTINUED | OUTPATIENT
Start: 2023-04-20 | End: 2023-04-29

## 2023-04-20 RX ADMIN — AMPICILLIN SODIUM AND SULBACTAM SODIUM 3000 MG: 2; 1 INJECTION, POWDER, FOR SOLUTION INTRAMUSCULAR; INTRAVENOUS at 04:16

## 2023-04-20 RX ADMIN — ENOXAPARIN SODIUM 30 MG: 100 INJECTION SUBCUTANEOUS at 20:52

## 2023-04-20 RX ADMIN — GADOTERIDOL 20 ML: 279.3 INJECTION, SOLUTION INTRAVENOUS at 14:41

## 2023-04-20 RX ADMIN — AMPICILLIN SODIUM AND SULBACTAM SODIUM 3000 MG: 2; 1 INJECTION, POWDER, FOR SOLUTION INTRAMUSCULAR; INTRAVENOUS at 22:14

## 2023-04-20 RX ADMIN — SODIUM CHLORIDE, PRESERVATIVE FREE 10 ML: 5 INJECTION INTRAVENOUS at 09:52

## 2023-04-20 RX ADMIN — ENOXAPARIN SODIUM 30 MG: 100 INJECTION SUBCUTANEOUS at 09:15

## 2023-04-20 RX ADMIN — SODIUM CHLORIDE, PRESERVATIVE FREE 10 ML: 5 INJECTION INTRAVENOUS at 20:52

## 2023-04-20 RX ADMIN — AMIODARONE HYDROCHLORIDE 200 MG: 200 TABLET ORAL at 20:51

## 2023-04-20 RX ADMIN — AMPICILLIN SODIUM AND SULBACTAM SODIUM 3000 MG: 2; 1 INJECTION, POWDER, FOR SOLUTION INTRAMUSCULAR; INTRAVENOUS at 16:35

## 2023-04-20 RX ADMIN — MICONAZOLE NITRATE: 20 POWDER TOPICAL at 20:51

## 2023-04-20 RX ADMIN — AMPICILLIN SODIUM AND SULBACTAM SODIUM 3000 MG: 2; 1 INJECTION, POWDER, FOR SOLUTION INTRAMUSCULAR; INTRAVENOUS at 09:19

## 2023-04-20 RX ADMIN — CLOPIDOGREL BISULFATE 75 MG: 75 TABLET ORAL at 09:15

## 2023-04-20 RX ADMIN — METOPROLOL TARTRATE 50 MG: 50 TABLET, FILM COATED ORAL at 20:51

## 2023-04-20 RX ADMIN — METOPROLOL TARTRATE 50 MG: 50 TABLET, FILM COATED ORAL at 09:15

## 2023-04-20 RX ADMIN — AMIODARONE HYDROCHLORIDE 200 MG: 200 TABLET ORAL at 09:57

## 2023-04-20 RX ADMIN — INSULIN GLARGINE 5 UNITS: 100 INJECTION, SOLUTION SUBCUTANEOUS at 20:56

## 2023-04-20 RX ADMIN — ASPIRIN 81 MG: 81 TABLET, COATED ORAL at 09:15

## 2023-04-20 RX ADMIN — LOSARTAN POTASSIUM 25 MG: 25 TABLET, FILM COATED ORAL at 09:15

## 2023-04-20 RX ADMIN — Medication 1 CAPSULE: at 09:15

## 2023-04-20 RX ADMIN — MICONAZOLE NITRATE: 20 POWDER TOPICAL at 09:15

## 2023-04-20 RX ADMIN — MAGNESIUM SULFATE HEPTAHYDRATE 1000 MG: 1 INJECTION, SOLUTION INTRAVENOUS at 09:55

## 2023-04-20 ASSESSMENT — PAIN SCALES - GENERAL
PAINLEVEL_OUTOF10: 0

## 2023-04-21 PROBLEM — I48.91 ATRIAL FIBRILLATION WITH RVR (HCC): Status: ACTIVE | Noted: 2023-04-21

## 2023-04-21 LAB
ALBUMIN SERPL-MCNC: 2 G/DL (ref 3.4–5)
ANION GAP SERPL CALCULATED.3IONS-SCNC: 9 MMOL/L (ref 3–16)
BASOPHILS # BLD: 0 K/UL (ref 0–0.2)
BASOPHILS NFR BLD: 0.4 %
BUN SERPL-MCNC: 9 MG/DL (ref 7–20)
CALCIUM SERPL-MCNC: 8.4 MG/DL (ref 8.3–10.6)
CHLORIDE SERPL-SCNC: 108 MMOL/L (ref 99–110)
CO2 SERPL-SCNC: 23 MMOL/L (ref 21–32)
CREAT SERPL-MCNC: <0.5 MG/DL (ref 0.6–1.2)
DEPRECATED RDW RBC AUTO: 15.9 % (ref 12.4–15.4)
EOSINOPHIL # BLD: 0.1 K/UL (ref 0–0.6)
EOSINOPHIL NFR BLD: 2.3 %
GFR SERPLBLD CREATININE-BSD FMLA CKD-EPI: >60 ML/MIN/{1.73_M2}
GLUCOSE BLD-MCNC: 100 MG/DL (ref 70–99)
GLUCOSE BLD-MCNC: 102 MG/DL (ref 70–99)
GLUCOSE BLD-MCNC: 103 MG/DL (ref 70–99)
GLUCOSE BLD-MCNC: 155 MG/DL (ref 70–99)
GLUCOSE SERPL-MCNC: 107 MG/DL (ref 70–99)
HCT VFR BLD AUTO: 24.6 % (ref 36–48)
HGB BLD-MCNC: 7.9 G/DL (ref 12–16)
LYMPHOCYTES # BLD: 1.1 K/UL (ref 1–5.1)
LYMPHOCYTES NFR BLD: 20.6 %
MAGNESIUM SERPL-MCNC: 1.8 MG/DL (ref 1.8–2.4)
MCH RBC QN AUTO: 27.2 PG (ref 26–34)
MCHC RBC AUTO-ENTMCNC: 32.2 G/DL (ref 31–36)
MCV RBC AUTO: 84.4 FL (ref 80–100)
MONOCYTES # BLD: 0.5 K/UL (ref 0–1.3)
MONOCYTES NFR BLD: 8.7 %
NEUTROPHILS # BLD: 3.5 K/UL (ref 1.7–7.7)
NEUTROPHILS NFR BLD: 68 %
PERFORMED ON: ABNORMAL
PHOSPHATE SERPL-MCNC: 3.6 MG/DL (ref 2.5–4.9)
PLATELET # BLD AUTO: 320 K/UL (ref 135–450)
PMV BLD AUTO: 6.7 FL (ref 5–10.5)
POTASSIUM SERPL-SCNC: 4.5 MMOL/L (ref 3.5–5.1)
RBC # BLD AUTO: 2.92 M/UL (ref 4–5.2)
SODIUM SERPL-SCNC: 140 MMOL/L (ref 136–145)
WBC # BLD AUTO: 5.2 K/UL (ref 4–11)

## 2023-04-21 PROCEDURE — 97530 THERAPEUTIC ACTIVITIES: CPT

## 2023-04-21 PROCEDURE — 94761 N-INVAS EAR/PLS OXIMETRY MLT: CPT

## 2023-04-21 PROCEDURE — 80069 RENAL FUNCTION PANEL: CPT

## 2023-04-21 PROCEDURE — 83735 ASSAY OF MAGNESIUM: CPT

## 2023-04-21 PROCEDURE — 2500000003 HC RX 250 WO HCPCS: Performed by: INTERNAL MEDICINE

## 2023-04-21 PROCEDURE — 2580000003 HC RX 258: Performed by: INTERNAL MEDICINE

## 2023-04-21 PROCEDURE — 2060000000 HC ICU INTERMEDIATE R&B

## 2023-04-21 PROCEDURE — 99232 SBSQ HOSP IP/OBS MODERATE 35: CPT | Performed by: INTERNAL MEDICINE

## 2023-04-21 PROCEDURE — 36569 INSJ PICC 5 YR+ W/O IMAGING: CPT

## 2023-04-21 PROCEDURE — 6370000000 HC RX 637 (ALT 250 FOR IP): Performed by: INTERNAL MEDICINE

## 2023-04-21 PROCEDURE — 6370000000 HC RX 637 (ALT 250 FOR IP)

## 2023-04-21 PROCEDURE — 6360000002 HC RX W HCPCS: Performed by: INTERNAL MEDICINE

## 2023-04-21 PROCEDURE — 6360000002 HC RX W HCPCS

## 2023-04-21 PROCEDURE — 2580000003 HC RX 258

## 2023-04-21 PROCEDURE — 99232 SBSQ HOSP IP/OBS MODERATE 35: CPT | Performed by: NURSE PRACTITIONER

## 2023-04-21 PROCEDURE — 36415 COLL VENOUS BLD VENIPUNCTURE: CPT

## 2023-04-21 PROCEDURE — 1200000000 HC SEMI PRIVATE

## 2023-04-21 PROCEDURE — 97110 THERAPEUTIC EXERCISES: CPT

## 2023-04-21 PROCEDURE — 2700000000 HC OXYGEN THERAPY PER DAY

## 2023-04-21 PROCEDURE — 85025 COMPLETE CBC W/AUTO DIFF WBC: CPT

## 2023-04-21 PROCEDURE — C1751 CATH, INF, PER/CENT/MIDLINE: HCPCS

## 2023-04-21 PROCEDURE — 02HV33Z INSERTION OF INFUSION DEVICE INTO SUPERIOR VENA CAVA, PERCUTANEOUS APPROACH: ICD-10-PCS | Performed by: INTERNAL MEDICINE

## 2023-04-21 RX ORDER — SODIUM CHLORIDE 0.9 % (FLUSH) 0.9 %
5-40 SYRINGE (ML) INJECTION EVERY 12 HOURS SCHEDULED
Status: DISCONTINUED | OUTPATIENT
Start: 2023-04-21 | End: 2023-04-22

## 2023-04-21 RX ORDER — SODIUM CHLORIDE 0.9 % (FLUSH) 0.9 %
5-40 SYRINGE (ML) INJECTION PRN
Status: DISCONTINUED | OUTPATIENT
Start: 2023-04-21 | End: 2023-04-26 | Stop reason: SDUPTHER

## 2023-04-21 RX ORDER — SODIUM CHLORIDE 9 MG/ML
25 INJECTION, SOLUTION INTRAVENOUS PRN
Status: DISCONTINUED | OUTPATIENT
Start: 2023-04-21 | End: 2023-05-03 | Stop reason: HOSPADM

## 2023-04-21 RX ORDER — MAGNESIUM SULFATE IN WATER 40 MG/ML
2000 INJECTION, SOLUTION INTRAVENOUS ONCE
Status: COMPLETED | OUTPATIENT
Start: 2023-04-21 | End: 2023-04-21

## 2023-04-21 RX ORDER — LIDOCAINE HYDROCHLORIDE 10 MG/ML
5 INJECTION, SOLUTION EPIDURAL; INFILTRATION; INTRACAUDAL; PERINEURAL ONCE
Status: COMPLETED | OUTPATIENT
Start: 2023-04-21 | End: 2023-04-21

## 2023-04-21 RX ADMIN — SODIUM CHLORIDE, PRESERVATIVE FREE 10 ML: 5 INJECTION INTRAVENOUS at 08:38

## 2023-04-21 RX ADMIN — Medication 1 CAPSULE: at 08:37

## 2023-04-21 RX ADMIN — CLOPIDOGREL BISULFATE 75 MG: 75 TABLET ORAL at 08:38

## 2023-04-21 RX ADMIN — LIDOCAINE HYDROCHLORIDE 5 ML: 10 INJECTION, SOLUTION EPIDURAL; INFILTRATION; INTRACAUDAL; PERINEURAL at 13:26

## 2023-04-21 RX ADMIN — ACETAMINOPHEN 650 MG: 325 TABLET ORAL at 00:03

## 2023-04-21 RX ADMIN — ENOXAPARIN SODIUM 30 MG: 100 INJECTION SUBCUTANEOUS at 21:02

## 2023-04-21 RX ADMIN — SODIUM CHLORIDE, PRESERVATIVE FREE 10 ML: 5 INJECTION INTRAVENOUS at 21:09

## 2023-04-21 RX ADMIN — INSULIN GLARGINE 5 UNITS: 100 INJECTION, SOLUTION SUBCUTANEOUS at 21:02

## 2023-04-21 RX ADMIN — ASPIRIN 81 MG: 81 TABLET, COATED ORAL at 08:37

## 2023-04-21 RX ADMIN — MICONAZOLE NITRATE: 20 POWDER TOPICAL at 21:09

## 2023-04-21 RX ADMIN — AMIODARONE HYDROCHLORIDE 200 MG: 200 TABLET ORAL at 21:02

## 2023-04-21 RX ADMIN — MAGNESIUM SULFATE HEPTAHYDRATE 2000 MG: 40 INJECTION, SOLUTION INTRAVENOUS at 08:37

## 2023-04-21 RX ADMIN — AMIODARONE HYDROCHLORIDE 200 MG: 200 TABLET ORAL at 08:37

## 2023-04-21 RX ADMIN — ENOXAPARIN SODIUM 30 MG: 100 INJECTION SUBCUTANEOUS at 08:37

## 2023-04-21 RX ADMIN — POLYETHYLENE GLYCOL 3350 17 G: 17 POWDER, FOR SOLUTION ORAL at 08:38

## 2023-04-21 RX ADMIN — METOPROLOL TARTRATE 50 MG: 50 TABLET, FILM COATED ORAL at 08:37

## 2023-04-21 RX ADMIN — AMPICILLIN SODIUM AND SULBACTAM SODIUM 3000 MG: 2; 1 INJECTION, POWDER, FOR SOLUTION INTRAMUSCULAR; INTRAVENOUS at 04:12

## 2023-04-21 RX ADMIN — AMPICILLIN SODIUM AND SULBACTAM SODIUM 3000 MG: 2; 1 INJECTION, POWDER, FOR SOLUTION INTRAMUSCULAR; INTRAVENOUS at 15:24

## 2023-04-21 RX ADMIN — COLLAGENASE SANTYL: 250 OINTMENT TOPICAL at 08:49

## 2023-04-21 RX ADMIN — SODIUM CHLORIDE, PRESERVATIVE FREE 10 ML: 5 INJECTION INTRAVENOUS at 21:02

## 2023-04-21 RX ADMIN — MICONAZOLE NITRATE: 20 POWDER TOPICAL at 08:40

## 2023-04-21 RX ADMIN — AMPICILLIN SODIUM AND SULBACTAM SODIUM 3000 MG: 2; 1 INJECTION, POWDER, FOR SOLUTION INTRAMUSCULAR; INTRAVENOUS at 21:55

## 2023-04-21 RX ADMIN — AMPICILLIN SODIUM AND SULBACTAM SODIUM 3000 MG: 2; 1 INJECTION, POWDER, FOR SOLUTION INTRAMUSCULAR; INTRAVENOUS at 10:43

## 2023-04-21 RX ADMIN — LOSARTAN POTASSIUM 25 MG: 25 TABLET, FILM COATED ORAL at 08:38

## 2023-04-21 RX ADMIN — METOPROLOL TARTRATE 50 MG: 50 TABLET, FILM COATED ORAL at 21:02

## 2023-04-21 ASSESSMENT — PAIN SCALES - GENERAL
PAINLEVEL_OUTOF10: 0

## 2023-04-22 ENCOUNTER — APPOINTMENT (OUTPATIENT)
Dept: GENERAL RADIOLOGY | Age: 63
DRG: 622 | End: 2023-04-22
Payer: COMMERCIAL

## 2023-04-22 ENCOUNTER — APPOINTMENT (OUTPATIENT)
Dept: CT IMAGING | Age: 63
DRG: 622 | End: 2023-04-22
Payer: COMMERCIAL

## 2023-04-22 LAB
ALBUMIN SERPL-MCNC: 2.2 G/DL (ref 3.4–5)
ALBUMIN SERPL-MCNC: 2.3 G/DL (ref 3.4–5)
ANION GAP SERPL CALCULATED.3IONS-SCNC: 7 MMOL/L (ref 3–16)
ANION GAP SERPL CALCULATED.3IONS-SCNC: 8 MMOL/L (ref 3–16)
BACTERIA BLD CULT ORG #2: NORMAL
BACTERIA BLD CULT: NORMAL
BASOPHILS # BLD: 0 K/UL (ref 0–0.2)
BASOPHILS NFR BLD: 0.6 %
BUN SERPL-MCNC: 11 MG/DL (ref 7–20)
BUN SERPL-MCNC: 8 MG/DL (ref 7–20)
CALCIUM SERPL-MCNC: 8.2 MG/DL (ref 8.3–10.6)
CALCIUM SERPL-MCNC: 8.4 MG/DL (ref 8.3–10.6)
CHLORIDE SERPL-SCNC: 103 MMOL/L (ref 99–110)
CHLORIDE SERPL-SCNC: 106 MMOL/L (ref 99–110)
CO2 SERPL-SCNC: 29 MMOL/L (ref 21–32)
CO2 SERPL-SCNC: 29 MMOL/L (ref 21–32)
CREAT SERPL-MCNC: <0.5 MG/DL (ref 0.6–1.2)
CREAT SERPL-MCNC: <0.5 MG/DL (ref 0.6–1.2)
DEPRECATED RDW RBC AUTO: 15.8 % (ref 12.4–15.4)
EOSINOPHIL # BLD: 0.1 K/UL (ref 0–0.6)
EOSINOPHIL NFR BLD: 1.6 %
GFR SERPLBLD CREATININE-BSD FMLA CKD-EPI: >60 ML/MIN/{1.73_M2}
GFR SERPLBLD CREATININE-BSD FMLA CKD-EPI: >60 ML/MIN/{1.73_M2}
GLUCOSE BLD-MCNC: 115 MG/DL (ref 70–99)
GLUCOSE BLD-MCNC: 123 MG/DL (ref 70–99)
GLUCOSE BLD-MCNC: 134 MG/DL (ref 70–99)
GLUCOSE BLD-MCNC: 137 MG/DL (ref 70–99)
GLUCOSE SERPL-MCNC: 137 MG/DL (ref 70–99)
GLUCOSE SERPL-MCNC: 138 MG/DL (ref 70–99)
HCT VFR BLD AUTO: 25.7 % (ref 36–48)
HGB BLD-MCNC: 8.2 G/DL (ref 12–16)
LYMPHOCYTES # BLD: 0.8 K/UL (ref 1–5.1)
LYMPHOCYTES NFR BLD: 16.3 %
MAGNESIUM SERPL-MCNC: 1.7 MG/DL (ref 1.8–2.4)
MAGNESIUM SERPL-MCNC: 1.9 MG/DL (ref 1.8–2.4)
MCH RBC QN AUTO: 27.2 PG (ref 26–34)
MCHC RBC AUTO-ENTMCNC: 32.1 G/DL (ref 31–36)
MCV RBC AUTO: 84.8 FL (ref 80–100)
MONOCYTES # BLD: 0.4 K/UL (ref 0–1.3)
MONOCYTES NFR BLD: 7.7 %
NEUTROPHILS # BLD: 3.6 K/UL (ref 1.7–7.7)
NEUTROPHILS NFR BLD: 73.8 %
NT-PROBNP SERPL-MCNC: ABNORMAL PG/ML (ref 0–124)
PERFORMED ON: ABNORMAL
PHOSPHATE SERPL-MCNC: 3.5 MG/DL (ref 2.5–4.9)
PHOSPHATE SERPL-MCNC: 3.6 MG/DL (ref 2.5–4.9)
PLATELET # BLD AUTO: 326 K/UL (ref 135–450)
PMV BLD AUTO: 6.7 FL (ref 5–10.5)
POTASSIUM SERPL-SCNC: 4 MMOL/L (ref 3.5–5.1)
POTASSIUM SERPL-SCNC: 4.1 MMOL/L (ref 3.5–5.1)
RBC # BLD AUTO: 3.03 M/UL (ref 4–5.2)
SODIUM SERPL-SCNC: 140 MMOL/L (ref 136–145)
SODIUM SERPL-SCNC: 142 MMOL/L (ref 136–145)
TROPONIN, HIGH SENSITIVITY: 91 NG/L (ref 0–14)
WBC # BLD AUTO: 4.9 K/UL (ref 4–11)

## 2023-04-22 PROCEDURE — 2060000000 HC ICU INTERMEDIATE R&B

## 2023-04-22 PROCEDURE — 85025 COMPLETE CBC W/AUTO DIFF WBC: CPT

## 2023-04-22 PROCEDURE — 2580000003 HC RX 258

## 2023-04-22 PROCEDURE — 80069 RENAL FUNCTION PANEL: CPT

## 2023-04-22 PROCEDURE — 6360000004 HC RX CONTRAST MEDICATION: Performed by: INTERNAL MEDICINE

## 2023-04-22 PROCEDURE — 83735 ASSAY OF MAGNESIUM: CPT

## 2023-04-22 PROCEDURE — 6370000000 HC RX 637 (ALT 250 FOR IP): Performed by: INTERNAL MEDICINE

## 2023-04-22 PROCEDURE — 71275 CT ANGIOGRAPHY CHEST: CPT

## 2023-04-22 PROCEDURE — 6370000000 HC RX 637 (ALT 250 FOR IP)

## 2023-04-22 PROCEDURE — 1200000000 HC SEMI PRIVATE

## 2023-04-22 PROCEDURE — 93005 ELECTROCARDIOGRAM TRACING: CPT | Performed by: INTERNAL MEDICINE

## 2023-04-22 PROCEDURE — 71045 X-RAY EXAM CHEST 1 VIEW: CPT

## 2023-04-22 PROCEDURE — 6360000002 HC RX W HCPCS

## 2023-04-22 PROCEDURE — 84484 ASSAY OF TROPONIN QUANT: CPT

## 2023-04-22 PROCEDURE — 92610 EVALUATE SWALLOWING FUNCTION: CPT

## 2023-04-22 PROCEDURE — 2580000003 HC RX 258: Performed by: INTERNAL MEDICINE

## 2023-04-22 PROCEDURE — 6360000002 HC RX W HCPCS: Performed by: INTERNAL MEDICINE

## 2023-04-22 PROCEDURE — 83880 ASSAY OF NATRIURETIC PEPTIDE: CPT

## 2023-04-22 RX ORDER — MAGNESIUM SULFATE IN WATER 40 MG/ML
4000 INJECTION, SOLUTION INTRAVENOUS ONCE
Status: COMPLETED | OUTPATIENT
Start: 2023-04-22 | End: 2023-04-22

## 2023-04-22 RX ORDER — FUROSEMIDE 10 MG/ML
20 INJECTION INTRAMUSCULAR; INTRAVENOUS ONCE
Status: COMPLETED | OUTPATIENT
Start: 2023-04-22 | End: 2023-04-22

## 2023-04-22 RX ORDER — METHOCARBAMOL 750 MG/1
750 TABLET, FILM COATED ORAL ONCE
Status: COMPLETED | OUTPATIENT
Start: 2023-04-22 | End: 2023-04-22

## 2023-04-22 RX ORDER — MAGNESIUM SULFATE IN WATER 40 MG/ML
2000 INJECTION, SOLUTION INTRAVENOUS ONCE
Status: DISCONTINUED | OUTPATIENT
Start: 2023-04-22 | End: 2023-04-22

## 2023-04-22 RX ADMIN — AMIODARONE HYDROCHLORIDE 200 MG: 200 TABLET ORAL at 09:36

## 2023-04-22 RX ADMIN — ASPIRIN 81 MG: 81 TABLET, COATED ORAL at 09:38

## 2023-04-22 RX ADMIN — ONDANSETRON 4 MG: 2 INJECTION INTRAMUSCULAR; INTRAVENOUS at 04:44

## 2023-04-22 RX ADMIN — ENOXAPARIN SODIUM 30 MG: 100 INJECTION SUBCUTANEOUS at 09:36

## 2023-04-22 RX ADMIN — SODIUM CHLORIDE, PRESERVATIVE FREE 10 ML: 5 INJECTION INTRAVENOUS at 09:36

## 2023-04-22 RX ADMIN — MICONAZOLE NITRATE: 20 POWDER TOPICAL at 21:47

## 2023-04-22 RX ADMIN — IOPAMIDOL 75 ML: 755 INJECTION, SOLUTION INTRAVENOUS at 19:00

## 2023-04-22 RX ADMIN — AMPICILLIN SODIUM AND SULBACTAM SODIUM 3000 MG: 2; 1 INJECTION, POWDER, FOR SOLUTION INTRAMUSCULAR; INTRAVENOUS at 09:41

## 2023-04-22 RX ADMIN — SODIUM CHLORIDE, PRESERVATIVE FREE 10 ML: 5 INJECTION INTRAVENOUS at 21:47

## 2023-04-22 RX ADMIN — SODIUM CHLORIDE, PRESERVATIVE FREE 10 ML: 5 INJECTION INTRAVENOUS at 09:44

## 2023-04-22 RX ADMIN — METHOCARBAMOL 750 MG: 750 TABLET ORAL at 17:14

## 2023-04-22 RX ADMIN — METOPROLOL TARTRATE 50 MG: 50 TABLET, FILM COATED ORAL at 09:36

## 2023-04-22 RX ADMIN — METOPROLOL TARTRATE 50 MG: 50 TABLET, FILM COATED ORAL at 19:43

## 2023-04-22 RX ADMIN — POLYETHYLENE GLYCOL 3350 17 G: 17 POWDER, FOR SOLUTION ORAL at 09:36

## 2023-04-22 RX ADMIN — AMIODARONE HYDROCHLORIDE 200 MG: 200 TABLET ORAL at 19:43

## 2023-04-22 RX ADMIN — INSULIN GLARGINE 5 UNITS: 100 INJECTION, SOLUTION SUBCUTANEOUS at 21:47

## 2023-04-22 RX ADMIN — ENOXAPARIN SODIUM 30 MG: 100 INJECTION SUBCUTANEOUS at 21:47

## 2023-04-22 RX ADMIN — CLOPIDOGREL BISULFATE 75 MG: 75 TABLET ORAL at 09:36

## 2023-04-22 RX ADMIN — LOSARTAN POTASSIUM 25 MG: 25 TABLET, FILM COATED ORAL at 09:36

## 2023-04-22 RX ADMIN — MAGNESIUM SULFATE HEPTAHYDRATE 4000 MG: 40 INJECTION, SOLUTION INTRAVENOUS at 10:41

## 2023-04-22 RX ADMIN — Medication 1 CAPSULE: at 09:36

## 2023-04-22 RX ADMIN — AMPICILLIN SODIUM AND SULBACTAM SODIUM 3000 MG: 2; 1 INJECTION, POWDER, FOR SOLUTION INTRAMUSCULAR; INTRAVENOUS at 21:57

## 2023-04-22 RX ADMIN — AMPICILLIN SODIUM AND SULBACTAM SODIUM 3000 MG: 2; 1 INJECTION, POWDER, FOR SOLUTION INTRAMUSCULAR; INTRAVENOUS at 17:20

## 2023-04-22 RX ADMIN — AMPICILLIN SODIUM AND SULBACTAM SODIUM 3000 MG: 2; 1 INJECTION, POWDER, FOR SOLUTION INTRAMUSCULAR; INTRAVENOUS at 03:56

## 2023-04-22 RX ADMIN — SODIUM CHLORIDE, PRESERVATIVE FREE 10 ML: 5 INJECTION INTRAVENOUS at 09:43

## 2023-04-22 RX ADMIN — MICONAZOLE NITRATE: 20 POWDER TOPICAL at 09:42

## 2023-04-22 RX ADMIN — FUROSEMIDE 20 MG: 10 INJECTION, SOLUTION INTRAMUSCULAR; INTRAVENOUS at 17:14

## 2023-04-22 ASSESSMENT — PAIN DESCRIPTION - ORIENTATION
ORIENTATION: MID
ORIENTATION: MID

## 2023-04-22 ASSESSMENT — PAIN DESCRIPTION - FREQUENCY
FREQUENCY: INTERMITTENT
FREQUENCY: INTERMITTENT

## 2023-04-22 ASSESSMENT — PAIN SCALES - WONG BAKER: WONGBAKER_NUMERICALRESPONSE: 0

## 2023-04-22 ASSESSMENT — PAIN SCALES - GENERAL
PAINLEVEL_OUTOF10: 6
PAINLEVEL_OUTOF10: 0
PAINLEVEL_OUTOF10: 4

## 2023-04-22 ASSESSMENT — PAIN DESCRIPTION - LOCATION
LOCATION: BACK
LOCATION: BACK

## 2023-04-22 ASSESSMENT — PAIN DESCRIPTION - DESCRIPTORS
DESCRIPTORS: ACHING
DESCRIPTORS: ACHING

## 2023-04-22 ASSESSMENT — PAIN - FUNCTIONAL ASSESSMENT
PAIN_FUNCTIONAL_ASSESSMENT: PREVENTS OR INTERFERES WITH MANY ACTIVE NOT PASSIVE ACTIVITIES
PAIN_FUNCTIONAL_ASSESSMENT: INTOLERABLE, UNABLE TO DO ANY ACTIVE OR PASSIVE ACTIVITIES

## 2023-04-22 ASSESSMENT — PAIN DESCRIPTION - PAIN TYPE
TYPE: CHRONIC PAIN
TYPE: CHRONIC PAIN

## 2023-04-23 LAB
ABO + RH BLD: NORMAL
ALBUMIN SERPL-MCNC: 2.4 G/DL (ref 3.4–5)
ANION GAP SERPL CALCULATED.3IONS-SCNC: 5 MMOL/L (ref 3–16)
ANTI-XA UNFRAC HEPARIN: 0.19 IU/ML (ref 0.3–0.7)
APTT BLD: 30.6 SEC (ref 22.7–35.9)
BASOPHILS # BLD: 0 K/UL (ref 0–0.2)
BASOPHILS NFR BLD: 0.4 %
BLD GP AB SCN SERPL QL: NORMAL
BUN SERPL-MCNC: 7 MG/DL (ref 7–20)
CALCIUM SERPL-MCNC: 8.5 MG/DL (ref 8.3–10.6)
CHLORIDE SERPL-SCNC: 104 MMOL/L (ref 99–110)
CO2 SERPL-SCNC: 32 MMOL/L (ref 21–32)
CREAT SERPL-MCNC: <0.5 MG/DL (ref 0.6–1.2)
DEPRECATED RDW RBC AUTO: 16.7 % (ref 12.4–15.4)
EKG ATRIAL RATE: 78 BPM
EKG DIAGNOSIS: NORMAL
EKG P AXIS: 19 DEGREES
EKG P-R INTERVAL: 160 MS
EKG Q-T INTERVAL: 408 MS
EKG QRS DURATION: 96 MS
EKG QTC CALCULATION (BAZETT): 465 MS
EKG R AXIS: -15 DEGREES
EKG T AXIS: 127 DEGREES
EKG VENTRICULAR RATE: 78 BPM
EOSINOPHIL # BLD: 0.1 K/UL (ref 0–0.6)
EOSINOPHIL NFR BLD: 1.6 %
GFR SERPLBLD CREATININE-BSD FMLA CKD-EPI: >60 ML/MIN/{1.73_M2}
GLUCOSE BLD-MCNC: 109 MG/DL (ref 70–99)
GLUCOSE BLD-MCNC: 140 MG/DL (ref 70–99)
GLUCOSE BLD-MCNC: 168 MG/DL (ref 70–99)
GLUCOSE BLD-MCNC: 88 MG/DL (ref 70–99)
GLUCOSE SERPL-MCNC: 96 MG/DL (ref 70–99)
HCT VFR BLD AUTO: 25.7 % (ref 36–48)
HGB BLD-MCNC: 8.3 G/DL (ref 12–16)
INR PPP: 1.13 (ref 0.84–1.16)
LYMPHOCYTES # BLD: 0.8 K/UL (ref 1–5.1)
LYMPHOCYTES NFR BLD: 16 %
MAGNESIUM SERPL-MCNC: 1.8 MG/DL (ref 1.8–2.4)
MCH RBC QN AUTO: 27.2 PG (ref 26–34)
MCHC RBC AUTO-ENTMCNC: 32.2 G/DL (ref 31–36)
MCV RBC AUTO: 84.5 FL (ref 80–100)
MONOCYTES # BLD: 0.4 K/UL (ref 0–1.3)
MONOCYTES NFR BLD: 7.8 %
NEUTROPHILS # BLD: 3.7 K/UL (ref 1.7–7.7)
NEUTROPHILS NFR BLD: 74.2 %
PERFORMED ON: ABNORMAL
PERFORMED ON: NORMAL
PHOSPHATE SERPL-MCNC: 3.4 MG/DL (ref 2.5–4.9)
PLATELET # BLD AUTO: 312 K/UL (ref 135–450)
PMV BLD AUTO: 6.5 FL (ref 5–10.5)
POTASSIUM SERPL-SCNC: 3.9 MMOL/L (ref 3.5–5.1)
PROTHROMBIN TIME: 14.6 SEC (ref 11.5–14.8)
RBC # BLD AUTO: 3.04 M/UL (ref 4–5.2)
SODIUM SERPL-SCNC: 141 MMOL/L (ref 136–145)
TROPONIN, HIGH SENSITIVITY: 88 NG/L (ref 0–14)
WBC # BLD AUTO: 5 K/UL (ref 4–11)

## 2023-04-23 PROCEDURE — 85610 PROTHROMBIN TIME: CPT

## 2023-04-23 PROCEDURE — 6360000002 HC RX W HCPCS: Performed by: STUDENT IN AN ORGANIZED HEALTH CARE EDUCATION/TRAINING PROGRAM

## 2023-04-23 PROCEDURE — 83735 ASSAY OF MAGNESIUM: CPT

## 2023-04-23 PROCEDURE — 86900 BLOOD TYPING SEROLOGIC ABO: CPT

## 2023-04-23 PROCEDURE — 6370000000 HC RX 637 (ALT 250 FOR IP): Performed by: INTERNAL MEDICINE

## 2023-04-23 PROCEDURE — 85520 HEPARIN ASSAY: CPT

## 2023-04-23 PROCEDURE — 2580000003 HC RX 258: Performed by: INTERNAL MEDICINE

## 2023-04-23 PROCEDURE — 2580000003 HC RX 258

## 2023-04-23 PROCEDURE — 6360000002 HC RX W HCPCS: Performed by: INTERNAL MEDICINE

## 2023-04-23 PROCEDURE — 6360000002 HC RX W HCPCS

## 2023-04-23 PROCEDURE — 1200000000 HC SEMI PRIVATE

## 2023-04-23 PROCEDURE — 86850 RBC ANTIBODY SCREEN: CPT

## 2023-04-23 PROCEDURE — 80069 RENAL FUNCTION PANEL: CPT

## 2023-04-23 PROCEDURE — 93010 ELECTROCARDIOGRAM REPORT: CPT | Performed by: INTERNAL MEDICINE

## 2023-04-23 PROCEDURE — 6370000000 HC RX 637 (ALT 250 FOR IP)

## 2023-04-23 PROCEDURE — 85730 THROMBOPLASTIN TIME PARTIAL: CPT

## 2023-04-23 PROCEDURE — 2060000000 HC ICU INTERMEDIATE R&B

## 2023-04-23 PROCEDURE — 85025 COMPLETE CBC W/AUTO DIFF WBC: CPT

## 2023-04-23 PROCEDURE — 86901 BLOOD TYPING SEROLOGIC RH(D): CPT

## 2023-04-23 RX ORDER — HEPARIN SODIUM 10000 [USP'U]/100ML
5-30 INJECTION, SOLUTION INTRAVENOUS CONTINUOUS
Status: DISCONTINUED | OUTPATIENT
Start: 2023-04-23 | End: 2023-04-29

## 2023-04-23 RX ORDER — HEPARIN SODIUM 1000 [USP'U]/ML
4000 INJECTION, SOLUTION INTRAVENOUS; SUBCUTANEOUS PRN
Status: DISCONTINUED | OUTPATIENT
Start: 2023-04-23 | End: 2023-04-29

## 2023-04-23 RX ORDER — HEPARIN SODIUM 1000 [USP'U]/ML
4000 INJECTION, SOLUTION INTRAVENOUS; SUBCUTANEOUS ONCE
Status: COMPLETED | OUTPATIENT
Start: 2023-04-23 | End: 2023-04-23

## 2023-04-23 RX ORDER — LIDOCAINE 4 G/G
1 PATCH TOPICAL DAILY
Status: DISCONTINUED | OUTPATIENT
Start: 2023-04-24 | End: 2023-04-24

## 2023-04-23 RX ORDER — HEPARIN SODIUM 1000 [USP'U]/ML
2000 INJECTION, SOLUTION INTRAVENOUS; SUBCUTANEOUS PRN
Status: DISCONTINUED | OUTPATIENT
Start: 2023-04-23 | End: 2023-04-29

## 2023-04-23 RX ADMIN — MICONAZOLE NITRATE: 20 POWDER TOPICAL at 20:27

## 2023-04-23 RX ADMIN — AMIODARONE HYDROCHLORIDE 200 MG: 200 TABLET ORAL at 20:28

## 2023-04-23 RX ADMIN — CLOPIDOGREL BISULFATE 75 MG: 75 TABLET ORAL at 09:40

## 2023-04-23 RX ADMIN — HEPARIN SODIUM 9 UNITS/KG/HR: 10000 INJECTION, SOLUTION INTRAVENOUS at 17:14

## 2023-04-23 RX ADMIN — AMPICILLIN SODIUM AND SULBACTAM SODIUM 3000 MG: 2; 1 INJECTION, POWDER, FOR SOLUTION INTRAMUSCULAR; INTRAVENOUS at 09:49

## 2023-04-23 RX ADMIN — HEPARIN SODIUM 4000 UNITS: 1000 INJECTION INTRAVENOUS; SUBCUTANEOUS at 17:11

## 2023-04-23 RX ADMIN — AMPICILLIN SODIUM AND SULBACTAM SODIUM 3000 MG: 2; 1 INJECTION, POWDER, FOR SOLUTION INTRAMUSCULAR; INTRAVENOUS at 03:40

## 2023-04-23 RX ADMIN — MICONAZOLE NITRATE: 20 POWDER TOPICAL at 09:44

## 2023-04-23 RX ADMIN — Medication 1 CAPSULE: at 09:40

## 2023-04-23 RX ADMIN — AMPICILLIN SODIUM AND SULBACTAM SODIUM 3000 MG: 2; 1 INJECTION, POWDER, FOR SOLUTION INTRAMUSCULAR; INTRAVENOUS at 17:05

## 2023-04-23 RX ADMIN — ENOXAPARIN SODIUM 30 MG: 100 INJECTION SUBCUTANEOUS at 09:39

## 2023-04-23 RX ADMIN — AMIODARONE HYDROCHLORIDE 200 MG: 200 TABLET ORAL at 09:40

## 2023-04-23 RX ADMIN — FUROSEMIDE 5 MG/HR: 10 INJECTION, SOLUTION INTRAMUSCULAR; INTRAVENOUS at 13:45

## 2023-04-23 RX ADMIN — SODIUM CHLORIDE, PRESERVATIVE FREE 10 ML: 5 INJECTION INTRAVENOUS at 20:28

## 2023-04-23 RX ADMIN — SODIUM CHLORIDE, PRESERVATIVE FREE 10 ML: 5 INJECTION INTRAVENOUS at 09:41

## 2023-04-23 RX ADMIN — METOPROLOL TARTRATE 50 MG: 50 TABLET, FILM COATED ORAL at 09:40

## 2023-04-23 RX ADMIN — METOPROLOL TARTRATE 50 MG: 50 TABLET, FILM COATED ORAL at 20:27

## 2023-04-23 RX ADMIN — ASPIRIN 81 MG: 81 TABLET, COATED ORAL at 09:40

## 2023-04-23 RX ADMIN — COLLAGENASE SANTYL: 250 OINTMENT TOPICAL at 10:57

## 2023-04-23 ASSESSMENT — PAIN SCALES - GENERAL
PAINLEVEL_OUTOF10: 0
PAINLEVEL_OUTOF10: 0

## 2023-04-24 LAB
ALBUMIN SERPL-MCNC: 2.4 G/DL (ref 3.4–5)
ALBUMIN SERPL-MCNC: 2.5 G/DL (ref 3.4–5)
ANION GAP SERPL CALCULATED.3IONS-SCNC: 6 MMOL/L (ref 3–16)
ANION GAP SERPL CALCULATED.3IONS-SCNC: 6 MMOL/L (ref 3–16)
ANION GAP SERPL CALCULATED.3IONS-SCNC: 7 MMOL/L (ref 3–16)
ANTI-XA UNFRAC HEPARIN: 0.1 IU/ML (ref 0.3–0.7)
ANTI-XA UNFRAC HEPARIN: 0.22 IU/ML (ref 0.3–0.7)
ANTI-XA UNFRAC HEPARIN: 0.23 IU/ML (ref 0.3–0.7)
ANTI-XA UNFRAC HEPARIN: 0.43 IU/ML (ref 0.3–0.7)
BASOPHILS # BLD: 0 K/UL (ref 0–0.2)
BASOPHILS NFR BLD: 0.7 %
BUN SERPL-MCNC: 11 MG/DL (ref 7–20)
BUN SERPL-MCNC: 7 MG/DL (ref 7–20)
BUN SERPL-MCNC: 9 MG/DL (ref 7–20)
CALCIUM SERPL-MCNC: 8.3 MG/DL (ref 8.3–10.6)
CALCIUM SERPL-MCNC: 8.4 MG/DL (ref 8.3–10.6)
CALCIUM SERPL-MCNC: 8.4 MG/DL (ref 8.3–10.6)
CHLORIDE SERPL-SCNC: 102 MMOL/L (ref 99–110)
CHLORIDE SERPL-SCNC: 103 MMOL/L (ref 99–110)
CHLORIDE SERPL-SCNC: 104 MMOL/L (ref 99–110)
CO2 SERPL-SCNC: 32 MMOL/L (ref 21–32)
CO2 SERPL-SCNC: 33 MMOL/L (ref 21–32)
CO2 SERPL-SCNC: 33 MMOL/L (ref 21–32)
CREAT SERPL-MCNC: <0.5 MG/DL (ref 0.6–1.2)
DEPRECATED RDW RBC AUTO: 16.2 % (ref 12.4–15.4)
EOSINOPHIL # BLD: 0.1 K/UL (ref 0–0.6)
EOSINOPHIL NFR BLD: 1.3 %
GFR SERPLBLD CREATININE-BSD FMLA CKD-EPI: >60 ML/MIN/{1.73_M2}
GLUCOSE BLD-MCNC: 122 MG/DL (ref 70–99)
GLUCOSE BLD-MCNC: 135 MG/DL (ref 70–99)
GLUCOSE BLD-MCNC: 147 MG/DL (ref 70–99)
GLUCOSE BLD-MCNC: 174 MG/DL (ref 70–99)
GLUCOSE SERPL-MCNC: 122 MG/DL (ref 70–99)
GLUCOSE SERPL-MCNC: 155 MG/DL (ref 70–99)
GLUCOSE SERPL-MCNC: 178 MG/DL (ref 70–99)
HCT VFR BLD AUTO: 25.2 % (ref 36–48)
HGB BLD-MCNC: 8.2 G/DL (ref 12–16)
LYMPHOCYTES # BLD: 0.8 K/UL (ref 1–5.1)
LYMPHOCYTES NFR BLD: 15.9 %
MAGNESIUM SERPL-MCNC: 1.5 MG/DL (ref 1.8–2.4)
MAGNESIUM SERPL-MCNC: 1.8 MG/DL (ref 1.8–2.4)
MCH RBC QN AUTO: 27 PG (ref 26–34)
MCHC RBC AUTO-ENTMCNC: 32.5 G/DL (ref 31–36)
MCV RBC AUTO: 83 FL (ref 80–100)
MONOCYTES # BLD: 0.4 K/UL (ref 0–1.3)
MONOCYTES NFR BLD: 7.7 %
NEUTROPHILS # BLD: 3.8 K/UL (ref 1.7–7.7)
NEUTROPHILS NFR BLD: 74.4 %
PERFORMED ON: ABNORMAL
PHOSPHATE SERPL-MCNC: 3.2 MG/DL (ref 2.5–4.9)
PHOSPHATE SERPL-MCNC: 3.3 MG/DL (ref 2.5–4.9)
PLATELET # BLD AUTO: 322 K/UL (ref 135–450)
PMV BLD AUTO: 6.6 FL (ref 5–10.5)
POTASSIUM SERPL-SCNC: 3.5 MMOL/L (ref 3.5–5.1)
POTASSIUM SERPL-SCNC: 3.8 MMOL/L (ref 3.5–5.1)
POTASSIUM SERPL-SCNC: 3.8 MMOL/L (ref 3.5–5.1)
RBC # BLD AUTO: 3.04 M/UL (ref 4–5.2)
SODIUM SERPL-SCNC: 141 MMOL/L (ref 136–145)
SODIUM SERPL-SCNC: 142 MMOL/L (ref 136–145)
SODIUM SERPL-SCNC: 143 MMOL/L (ref 136–145)
WBC # BLD AUTO: 5.2 K/UL (ref 4–11)

## 2023-04-24 PROCEDURE — 85520 HEPARIN ASSAY: CPT

## 2023-04-24 PROCEDURE — 6360000002 HC RX W HCPCS

## 2023-04-24 PROCEDURE — 2580000003 HC RX 258

## 2023-04-24 PROCEDURE — 2060000000 HC ICU INTERMEDIATE R&B

## 2023-04-24 PROCEDURE — 6360000002 HC RX W HCPCS: Performed by: STUDENT IN AN ORGANIZED HEALTH CARE EDUCATION/TRAINING PROGRAM

## 2023-04-24 PROCEDURE — 6370000000 HC RX 637 (ALT 250 FOR IP): Performed by: STUDENT IN AN ORGANIZED HEALTH CARE EDUCATION/TRAINING PROGRAM

## 2023-04-24 PROCEDURE — 6370000000 HC RX 637 (ALT 250 FOR IP): Performed by: INTERNAL MEDICINE

## 2023-04-24 PROCEDURE — 1200000000 HC SEMI PRIVATE

## 2023-04-24 PROCEDURE — 99232 SBSQ HOSP IP/OBS MODERATE 35: CPT | Performed by: INTERNAL MEDICINE

## 2023-04-24 PROCEDURE — 6360000002 HC RX W HCPCS: Performed by: INTERNAL MEDICINE

## 2023-04-24 PROCEDURE — 2580000003 HC RX 258: Performed by: INTERNAL MEDICINE

## 2023-04-24 PROCEDURE — 83735 ASSAY OF MAGNESIUM: CPT

## 2023-04-24 PROCEDURE — 99233 SBSQ HOSP IP/OBS HIGH 50: CPT | Performed by: NURSE PRACTITIONER

## 2023-04-24 PROCEDURE — 6370000000 HC RX 637 (ALT 250 FOR IP)

## 2023-04-24 PROCEDURE — 80069 RENAL FUNCTION PANEL: CPT

## 2023-04-24 PROCEDURE — 85025 COMPLETE CBC W/AUTO DIFF WBC: CPT

## 2023-04-24 RX ORDER — MAGNESIUM SULFATE IN WATER 40 MG/ML
2000 INJECTION, SOLUTION INTRAVENOUS
Status: COMPLETED | OUTPATIENT
Start: 2023-04-24 | End: 2023-04-24

## 2023-04-24 RX ORDER — MAGNESIUM SULFATE IN WATER 40 MG/ML
2000 INJECTION, SOLUTION INTRAVENOUS ONCE
Status: COMPLETED | OUTPATIENT
Start: 2023-04-24 | End: 2023-04-25

## 2023-04-24 RX ORDER — LIDOCAINE 4 G/G
1 PATCH TOPICAL DAILY
Status: DISCONTINUED | OUTPATIENT
Start: 2023-04-24 | End: 2023-05-03 | Stop reason: HOSPADM

## 2023-04-24 RX ORDER — FUROSEMIDE 10 MG/ML
40 INJECTION INTRAMUSCULAR; INTRAVENOUS 2 TIMES DAILY
Status: DISCONTINUED | OUTPATIENT
Start: 2023-04-24 | End: 2023-05-02

## 2023-04-24 RX ORDER — MAGNESIUM SULFATE IN WATER 40 MG/ML
2000 INJECTION, SOLUTION INTRAVENOUS ONCE
Status: COMPLETED | OUTPATIENT
Start: 2023-04-24 | End: 2023-04-24

## 2023-04-24 RX ORDER — POTASSIUM CHLORIDE 20 MEQ/1
20 TABLET, EXTENDED RELEASE ORAL ONCE
Status: DISCONTINUED | OUTPATIENT
Start: 2023-04-24 | End: 2023-04-24

## 2023-04-24 RX ORDER — POTASSIUM CHLORIDE 7.45 MG/ML
10 INJECTION INTRAVENOUS
Status: COMPLETED | OUTPATIENT
Start: 2023-04-24 | End: 2023-04-24

## 2023-04-24 RX ADMIN — AMPICILLIN SODIUM AND SULBACTAM SODIUM 3000 MG: 2; 1 INJECTION, POWDER, FOR SOLUTION INTRAMUSCULAR; INTRAVENOUS at 16:26

## 2023-04-24 RX ADMIN — SODIUM CHLORIDE, PRESERVATIVE FREE 10 ML: 5 INJECTION INTRAVENOUS at 08:07

## 2023-04-24 RX ADMIN — FUROSEMIDE 40 MG: 10 INJECTION, SOLUTION INTRAMUSCULAR; INTRAVENOUS at 17:56

## 2023-04-24 RX ADMIN — MAGNESIUM SULFATE HEPTAHYDRATE 2000 MG: 2 INJECTION, SOLUTION INTRAVENOUS at 04:10

## 2023-04-24 RX ADMIN — POTASSIUM BICARBONATE 40 MEQ: 782 TABLET, EFFERVESCENT ORAL at 16:30

## 2023-04-24 RX ADMIN — ONDANSETRON 4 MG: 2 INJECTION INTRAMUSCULAR; INTRAVENOUS at 06:19

## 2023-04-24 RX ADMIN — AMPICILLIN SODIUM AND SULBACTAM SODIUM 3000 MG: 2; 1 INJECTION, POWDER, FOR SOLUTION INTRAMUSCULAR; INTRAVENOUS at 22:03

## 2023-04-24 RX ADMIN — MAGNESIUM SULFATE HEPTAHYDRATE 2000 MG: 2 INJECTION, SOLUTION INTRAVENOUS at 02:06

## 2023-04-24 RX ADMIN — AMIODARONE HYDROCHLORIDE 200 MG: 200 TABLET ORAL at 20:38

## 2023-04-24 RX ADMIN — HEPARIN SODIUM 2000 UNITS: 1000 INJECTION INTRAVENOUS; SUBCUTANEOUS at 15:08

## 2023-04-24 RX ADMIN — POTASSIUM CHLORIDE 10 MEQ: 10 INJECTION, SOLUTION INTRAVENOUS at 03:35

## 2023-04-24 RX ADMIN — ASPIRIN 81 MG: 81 TABLET, COATED ORAL at 08:06

## 2023-04-24 RX ADMIN — MAGNESIUM SULFATE HEPTAHYDRATE 2000 MG: 40 INJECTION, SOLUTION INTRAVENOUS at 16:30

## 2023-04-24 RX ADMIN — AMIODARONE HYDROCHLORIDE 200 MG: 200 TABLET ORAL at 08:06

## 2023-04-24 RX ADMIN — MAGNESIUM SULFATE HEPTAHYDRATE 2000 MG: 2 INJECTION, SOLUTION INTRAVENOUS at 23:17

## 2023-04-24 RX ADMIN — POTASSIUM CHLORIDE 10 MEQ: 10 INJECTION, SOLUTION INTRAVENOUS at 04:42

## 2023-04-24 RX ADMIN — HEPARIN SODIUM 2000 UNITS: 1000 INJECTION INTRAVENOUS; SUBCUTANEOUS at 07:24

## 2023-04-24 RX ADMIN — HEPARIN SODIUM 15 UNITS/KG/HR: 10000 INJECTION, SOLUTION INTRAVENOUS at 15:08

## 2023-04-24 RX ADMIN — Medication 1 CAPSULE: at 08:06

## 2023-04-24 RX ADMIN — FUROSEMIDE 5 MG/HR: 10 INJECTION, SOLUTION INTRAMUSCULAR; INTRAVENOUS at 03:26

## 2023-04-24 RX ADMIN — COLLAGENASE SANTYL: 250 OINTMENT TOPICAL at 09:57

## 2023-04-24 RX ADMIN — POLYETHYLENE GLYCOL 3350 17 G: 17 POWDER, FOR SOLUTION ORAL at 08:06

## 2023-04-24 RX ADMIN — AMPICILLIN SODIUM AND SULBACTAM SODIUM 3000 MG: 2; 1 INJECTION, POWDER, FOR SOLUTION INTRAMUSCULAR; INTRAVENOUS at 01:08

## 2023-04-24 RX ADMIN — MICONAZOLE NITRATE: 20 POWDER TOPICAL at 21:39

## 2023-04-24 RX ADMIN — MICONAZOLE NITRATE: 20 POWDER TOPICAL at 08:10

## 2023-04-24 RX ADMIN — AMPICILLIN SODIUM AND SULBACTAM SODIUM 3000 MG: 2; 1 INJECTION, POWDER, FOR SOLUTION INTRAMUSCULAR; INTRAVENOUS at 10:19

## 2023-04-24 RX ADMIN — CLOPIDOGREL BISULFATE 75 MG: 75 TABLET ORAL at 08:06

## 2023-04-24 RX ADMIN — HEPARIN SODIUM 2000 UNITS: 1000 INJECTION INTRAVENOUS; SUBCUTANEOUS at 00:29

## 2023-04-24 RX ADMIN — METOPROLOL TARTRATE 50 MG: 50 TABLET, FILM COATED ORAL at 20:38

## 2023-04-24 RX ADMIN — METOPROLOL TARTRATE 50 MG: 50 TABLET, FILM COATED ORAL at 08:06

## 2023-04-24 RX ADMIN — AMPICILLIN SODIUM AND SULBACTAM SODIUM 3000 MG: 2; 1 INJECTION, POWDER, FOR SOLUTION INTRAMUSCULAR; INTRAVENOUS at 06:37

## 2023-04-24 ASSESSMENT — PAIN SCALES - GENERAL: PAINLEVEL_OUTOF10: 0

## 2023-04-25 LAB
ALBUMIN SERPL-MCNC: 2.3 G/DL (ref 3.4–5)
ANION GAP SERPL CALCULATED.3IONS-SCNC: 10 MMOL/L (ref 3–16)
ANION GAP SERPL CALCULATED.3IONS-SCNC: 5 MMOL/L (ref 3–16)
ANTI-XA UNFRAC HEPARIN: 0.39 IU/ML (ref 0.3–0.7)
BASOPHILS # BLD: 0 K/UL (ref 0–0.2)
BASOPHILS NFR BLD: 0.8 %
BUN SERPL-MCNC: 10 MG/DL (ref 7–20)
BUN SERPL-MCNC: 12 MG/DL (ref 7–20)
CALCIUM SERPL-MCNC: 8.3 MG/DL (ref 8.3–10.6)
CALCIUM SERPL-MCNC: 8.4 MG/DL (ref 8.3–10.6)
CHLORIDE SERPL-SCNC: 100 MMOL/L (ref 99–110)
CHLORIDE SERPL-SCNC: 98 MMOL/L (ref 99–110)
CO2 SERPL-SCNC: 30 MMOL/L (ref 21–32)
CO2 SERPL-SCNC: 34 MMOL/L (ref 21–32)
CREAT SERPL-MCNC: <0.5 MG/DL (ref 0.6–1.2)
CREAT SERPL-MCNC: <0.5 MG/DL (ref 0.6–1.2)
DEPRECATED RDW RBC AUTO: 16.5 % (ref 12.4–15.4)
EOSINOPHIL # BLD: 0.1 K/UL (ref 0–0.6)
EOSINOPHIL NFR BLD: 2.5 %
GFR SERPLBLD CREATININE-BSD FMLA CKD-EPI: >60 ML/MIN/{1.73_M2}
GFR SERPLBLD CREATININE-BSD FMLA CKD-EPI: >60 ML/MIN/{1.73_M2}
GLUCOSE BLD-MCNC: 134 MG/DL (ref 70–99)
GLUCOSE BLD-MCNC: 136 MG/DL (ref 70–99)
GLUCOSE BLD-MCNC: 150 MG/DL (ref 70–99)
GLUCOSE BLD-MCNC: 151 MG/DL (ref 70–99)
GLUCOSE SERPL-MCNC: 163 MG/DL (ref 70–99)
GLUCOSE SERPL-MCNC: 172 MG/DL (ref 70–99)
HCT VFR BLD AUTO: 24.4 % (ref 36–48)
HGB BLD-MCNC: 7.8 G/DL (ref 12–16)
LEFT VENTRICULAR EJECTION FRACTION HIGH VALUE: 25 %
LEFT VENTRICULAR EJECTION FRACTION MODE: NORMAL
LYMPHOCYTES # BLD: 1 K/UL (ref 1–5.1)
LYMPHOCYTES NFR BLD: 22.5 %
MAGNESIUM SERPL-MCNC: 2 MG/DL (ref 1.8–2.4)
MCH RBC QN AUTO: 26.7 PG (ref 26–34)
MCHC RBC AUTO-ENTMCNC: 32 G/DL (ref 31–36)
MCV RBC AUTO: 83.5 FL (ref 80–100)
MONOCYTES # BLD: 0.4 K/UL (ref 0–1.3)
MONOCYTES NFR BLD: 7.9 %
NEUTROPHILS # BLD: 3 K/UL (ref 1.7–7.7)
NEUTROPHILS NFR BLD: 66.3 %
NT-PROBNP SERPL-MCNC: 9619 PG/ML (ref 0–124)
PERFORMED ON: ABNORMAL
PHOSPHATE SERPL-MCNC: 3.6 MG/DL (ref 2.5–4.9)
PLATELET # BLD AUTO: 312 K/UL (ref 135–450)
PMV BLD AUTO: 6.8 FL (ref 5–10.5)
POTASSIUM SERPL-SCNC: 3.8 MMOL/L (ref 3.5–5.1)
POTASSIUM SERPL-SCNC: 4.1 MMOL/L (ref 3.5–5.1)
RBC # BLD AUTO: 2.92 M/UL (ref 4–5.2)
SODIUM SERPL-SCNC: 138 MMOL/L (ref 136–145)
SODIUM SERPL-SCNC: 139 MMOL/L (ref 136–145)
WBC # BLD AUTO: 4.6 K/UL (ref 4–11)

## 2023-04-25 PROCEDURE — 6370000000 HC RX 637 (ALT 250 FOR IP): Performed by: INTERNAL MEDICINE

## 2023-04-25 PROCEDURE — 99152 MOD SED SAME PHYS/QHP 5/>YRS: CPT

## 2023-04-25 PROCEDURE — 99221 1ST HOSP IP/OBS SF/LOW 40: CPT | Performed by: INTERNAL MEDICINE

## 2023-04-25 PROCEDURE — 2709999900 HC NON-CHARGEABLE SUPPLY

## 2023-04-25 PROCEDURE — 6360000004 HC RX CONTRAST MEDICATION: Performed by: INTERNAL MEDICINE

## 2023-04-25 PROCEDURE — 2500000003 HC RX 250 WO HCPCS

## 2023-04-25 PROCEDURE — 6360000002 HC RX W HCPCS: Performed by: INTERNAL MEDICINE

## 2023-04-25 PROCEDURE — 83880 ASSAY OF NATRIURETIC PEPTIDE: CPT

## 2023-04-25 PROCEDURE — 2580000003 HC RX 258

## 2023-04-25 PROCEDURE — APPSS30 APP SPLIT SHARED TIME 16-30 MINUTES: Performed by: NURSE PRACTITIONER

## 2023-04-25 PROCEDURE — 6360000002 HC RX W HCPCS: Performed by: STUDENT IN AN ORGANIZED HEALTH CARE EDUCATION/TRAINING PROGRAM

## 2023-04-25 PROCEDURE — B2151ZZ FLUOROSCOPY OF LEFT HEART USING LOW OSMOLAR CONTRAST: ICD-10-PCS | Performed by: INTERNAL MEDICINE

## 2023-04-25 PROCEDURE — 2580000003 HC RX 258: Performed by: INTERNAL MEDICINE

## 2023-04-25 PROCEDURE — C1769 GUIDE WIRE: HCPCS

## 2023-04-25 PROCEDURE — 4A023N7 MEASUREMENT OF CARDIAC SAMPLING AND PRESSURE, LEFT HEART, PERCUTANEOUS APPROACH: ICD-10-PCS | Performed by: INTERNAL MEDICINE

## 2023-04-25 PROCEDURE — 83735 ASSAY OF MAGNESIUM: CPT

## 2023-04-25 PROCEDURE — 80069 RENAL FUNCTION PANEL: CPT

## 2023-04-25 PROCEDURE — 2060000000 HC ICU INTERMEDIATE R&B

## 2023-04-25 PROCEDURE — 6370000000 HC RX 637 (ALT 250 FOR IP)

## 2023-04-25 PROCEDURE — 93458 L HRT ARTERY/VENTRICLE ANGIO: CPT

## 2023-04-25 PROCEDURE — 36592 COLLECT BLOOD FROM PICC: CPT

## 2023-04-25 PROCEDURE — 93458 L HRT ARTERY/VENTRICLE ANGIO: CPT | Performed by: INTERNAL MEDICINE

## 2023-04-25 PROCEDURE — C1894 INTRO/SHEATH, NON-LASER: HCPCS

## 2023-04-25 PROCEDURE — 99152 MOD SED SAME PHYS/QHP 5/>YRS: CPT | Performed by: INTERNAL MEDICINE

## 2023-04-25 PROCEDURE — 85520 HEPARIN ASSAY: CPT

## 2023-04-25 PROCEDURE — 6360000002 HC RX W HCPCS

## 2023-04-25 PROCEDURE — 85025 COMPLETE CBC W/AUTO DIFF WBC: CPT

## 2023-04-25 PROCEDURE — 1200000000 HC SEMI PRIVATE

## 2023-04-25 PROCEDURE — 99231 SBSQ HOSP IP/OBS SF/LOW 25: CPT | Performed by: INTERNAL MEDICINE

## 2023-04-25 PROCEDURE — B2111ZZ FLUOROSCOPY OF MULTIPLE CORONARY ARTERIES USING LOW OSMOLAR CONTRAST: ICD-10-PCS | Performed by: INTERNAL MEDICINE

## 2023-04-25 RX ORDER — SODIUM CHLORIDE 0.9 % (FLUSH) 0.9 %
5-40 SYRINGE (ML) INJECTION EVERY 12 HOURS SCHEDULED
Status: DISCONTINUED | OUTPATIENT
Start: 2023-04-25 | End: 2023-04-26 | Stop reason: SDUPTHER

## 2023-04-25 RX ORDER — SODIUM CHLORIDE 9 MG/ML
INJECTION, SOLUTION INTRAVENOUS CONTINUOUS
Status: ACTIVE | OUTPATIENT
Start: 2023-04-25 | End: 2023-04-25

## 2023-04-25 RX ORDER — POTASSIUM CHLORIDE 20 MEQ/1
40 TABLET, EXTENDED RELEASE ORAL ONCE
Status: DISCONTINUED | OUTPATIENT
Start: 2023-04-25 | End: 2023-04-25

## 2023-04-25 RX ORDER — SODIUM CHLORIDE 9 MG/ML
INJECTION, SOLUTION INTRAVENOUS PRN
Status: DISCONTINUED | OUTPATIENT
Start: 2023-04-25 | End: 2023-04-26 | Stop reason: SDUPTHER

## 2023-04-25 RX ORDER — ACETAMINOPHEN 325 MG/1
650 TABLET ORAL EVERY 4 HOURS PRN
Status: DISCONTINUED | OUTPATIENT
Start: 2023-04-25 | End: 2023-04-26 | Stop reason: SDUPTHER

## 2023-04-25 RX ORDER — SODIUM CHLORIDE 0.9 % (FLUSH) 0.9 %
5-40 SYRINGE (ML) INJECTION PRN
Status: DISCONTINUED | OUTPATIENT
Start: 2023-04-25 | End: 2023-04-26 | Stop reason: SDUPTHER

## 2023-04-25 RX ADMIN — AMIODARONE HYDROCHLORIDE 200 MG: 200 TABLET ORAL at 21:59

## 2023-04-25 RX ADMIN — CLOPIDOGREL BISULFATE 75 MG: 75 TABLET ORAL at 09:29

## 2023-04-25 RX ADMIN — AMPICILLIN SODIUM AND SULBACTAM SODIUM 3000 MG: 2; 1 INJECTION, POWDER, FOR SOLUTION INTRAMUSCULAR; INTRAVENOUS at 03:57

## 2023-04-25 RX ADMIN — AMIODARONE HYDROCHLORIDE 200 MG: 200 TABLET ORAL at 09:30

## 2023-04-25 RX ADMIN — Medication 1 CAPSULE: at 09:27

## 2023-04-25 RX ADMIN — MICONAZOLE NITRATE: 20 POWDER TOPICAL at 10:59

## 2023-04-25 RX ADMIN — METOPROLOL TARTRATE 50 MG: 50 TABLET, FILM COATED ORAL at 21:59

## 2023-04-25 RX ADMIN — ASPIRIN 81 MG: 81 TABLET, COATED ORAL at 09:30

## 2023-04-25 RX ADMIN — AMPICILLIN SODIUM AND SULBACTAM SODIUM 3000 MG: 2; 1 INJECTION, POWDER, FOR SOLUTION INTRAMUSCULAR; INTRAVENOUS at 18:10

## 2023-04-25 RX ADMIN — AMPICILLIN SODIUM AND SULBACTAM SODIUM 3000 MG: 2; 1 INJECTION, POWDER, FOR SOLUTION INTRAMUSCULAR; INTRAVENOUS at 09:44

## 2023-04-25 RX ADMIN — SODIUM CHLORIDE: 9 INJECTION, SOLUTION INTRAVENOUS at 16:26

## 2023-04-25 RX ADMIN — FUROSEMIDE 40 MG: 10 INJECTION, SOLUTION INTRAMUSCULAR; INTRAVENOUS at 18:12

## 2023-04-25 RX ADMIN — SODIUM CHLORIDE, PRESERVATIVE FREE 10 ML: 5 INJECTION INTRAVENOUS at 23:22

## 2023-04-25 RX ADMIN — METOPROLOL TARTRATE 50 MG: 50 TABLET, FILM COATED ORAL at 09:30

## 2023-04-25 RX ADMIN — FUROSEMIDE 40 MG: 10 INJECTION, SOLUTION INTRAMUSCULAR; INTRAVENOUS at 09:47

## 2023-04-25 RX ADMIN — HEPARIN SODIUM 15 UNITS/KG/HR: 10000 INJECTION, SOLUTION INTRAVENOUS at 06:35

## 2023-04-25 RX ADMIN — AMPICILLIN SODIUM AND SULBACTAM SODIUM 3000 MG: 2; 1 INJECTION, POWDER, FOR SOLUTION INTRAMUSCULAR; INTRAVENOUS at 21:59

## 2023-04-25 RX ADMIN — Medication 10 ML: at 22:05

## 2023-04-25 RX ADMIN — IOHEXOL 100 ML: 350 INJECTION, SOLUTION INTRAVENOUS at 15:09

## 2023-04-25 RX ADMIN — MICONAZOLE NITRATE: 20 POWDER TOPICAL at 23:21

## 2023-04-25 ASSESSMENT — PAIN SCALES - GENERAL
PAINLEVEL_OUTOF10: 0
PAINLEVEL_OUTOF10: 0

## 2023-04-26 ENCOUNTER — APPOINTMENT (OUTPATIENT)
Dept: CARDIAC CATH/INVASIVE PROCEDURES | Age: 63
DRG: 622 | End: 2023-04-26
Payer: COMMERCIAL

## 2023-04-26 PROBLEM — I25.10 CORONARY ARTERY DISEASE INVOLVING NATIVE CORONARY ARTERY OF NATIVE HEART WITHOUT ANGINA PECTORIS: Status: ACTIVE | Noted: 2023-04-26

## 2023-04-26 PROBLEM — Z98.890 S/P CORONARY ANGIOGRAM: Status: ACTIVE | Noted: 2023-04-26

## 2023-04-26 LAB
ALBUMIN SERPL-MCNC: 2.5 G/DL (ref 3.4–5)
ALBUMIN SERPL-MCNC: 2.8 G/DL (ref 3.4–5)
ANION GAP SERPL CALCULATED.3IONS-SCNC: 10 MMOL/L (ref 3–16)
ANION GAP SERPL CALCULATED.3IONS-SCNC: 7 MMOL/L (ref 3–16)
ANION GAP SERPL CALCULATED.3IONS-SCNC: 8 MMOL/L (ref 3–16)
BASOPHILS # BLD: 0 K/UL (ref 0–0.2)
BASOPHILS NFR BLD: 0.5 %
BUN SERPL-MCNC: 13 MG/DL (ref 7–20)
BUN SERPL-MCNC: 13 MG/DL (ref 7–20)
BUN SERPL-MCNC: 15 MG/DL (ref 7–20)
CALCIUM SERPL-MCNC: 8.5 MG/DL (ref 8.3–10.6)
CALCIUM SERPL-MCNC: 8.6 MG/DL (ref 8.3–10.6)
CALCIUM SERPL-MCNC: 8.6 MG/DL (ref 8.3–10.6)
CHLORIDE SERPL-SCNC: 100 MMOL/L (ref 99–110)
CHLORIDE SERPL-SCNC: 100 MMOL/L (ref 99–110)
CHLORIDE SERPL-SCNC: 102 MMOL/L (ref 99–110)
CO2 SERPL-SCNC: 33 MMOL/L (ref 21–32)
CO2 SERPL-SCNC: 33 MMOL/L (ref 21–32)
CO2 SERPL-SCNC: 34 MMOL/L (ref 21–32)
CREAT SERPL-MCNC: <0.5 MG/DL (ref 0.6–1.2)
DEPRECATED RDW RBC AUTO: 16.3 % (ref 12.4–15.4)
DEPRECATED RDW RBC AUTO: 16.5 % (ref 12.4–15.4)
EOSINOPHIL # BLD: 0.1 K/UL (ref 0–0.6)
EOSINOPHIL NFR BLD: 1.3 %
GFR SERPLBLD CREATININE-BSD FMLA CKD-EPI: >60 ML/MIN/{1.73_M2}
GLUCOSE BLD-MCNC: 134 MG/DL (ref 70–99)
GLUCOSE BLD-MCNC: 164 MG/DL (ref 70–99)
GLUCOSE BLD-MCNC: 176 MG/DL (ref 70–99)
GLUCOSE BLD-MCNC: 223 MG/DL (ref 70–99)
GLUCOSE SERPL-MCNC: 121 MG/DL (ref 70–99)
GLUCOSE SERPL-MCNC: 140 MG/DL (ref 70–99)
GLUCOSE SERPL-MCNC: 145 MG/DL (ref 70–99)
HCT VFR BLD AUTO: 24.7 % (ref 36–48)
HCT VFR BLD AUTO: 25.5 % (ref 36–48)
HGB BLD-MCNC: 8 G/DL (ref 12–16)
HGB BLD-MCNC: 8.2 G/DL (ref 12–16)
LYMPHOCYTES # BLD: 0.6 K/UL (ref 1–5.1)
LYMPHOCYTES NFR BLD: 14.3 %
MAGNESIUM SERPL-MCNC: 1.8 MG/DL (ref 1.8–2.4)
MAGNESIUM SERPL-MCNC: 2 MG/DL (ref 1.8–2.4)
MCH RBC QN AUTO: 27.1 PG (ref 26–34)
MCH RBC QN AUTO: 27.2 PG (ref 26–34)
MCHC RBC AUTO-ENTMCNC: 32.1 G/DL (ref 31–36)
MCHC RBC AUTO-ENTMCNC: 32.2 G/DL (ref 31–36)
MCV RBC AUTO: 84.2 FL (ref 80–100)
MCV RBC AUTO: 84.8 FL (ref 80–100)
MONOCYTES # BLD: 0.4 K/UL (ref 0–1.3)
MONOCYTES NFR BLD: 8.1 %
NEUTROPHILS # BLD: 3.4 K/UL (ref 1.7–7.7)
NEUTROPHILS NFR BLD: 75.8 %
PERFORMED ON: ABNORMAL
PHOSPHATE SERPL-MCNC: 3.7 MG/DL (ref 2.5–4.9)
PHOSPHATE SERPL-MCNC: 4.2 MG/DL (ref 2.5–4.9)
PLATELET # BLD AUTO: 316 K/UL (ref 135–450)
PLATELET # BLD AUTO: 319 K/UL (ref 135–450)
PMV BLD AUTO: 6.8 FL (ref 5–10.5)
PMV BLD AUTO: 6.8 FL (ref 5–10.5)
POTASSIUM SERPL-SCNC: 3.4 MMOL/L (ref 3.5–5.1)
POTASSIUM SERPL-SCNC: 3.6 MMOL/L (ref 3.5–5.1)
POTASSIUM SERPL-SCNC: 3.9 MMOL/L (ref 3.5–5.1)
RBC # BLD AUTO: 2.94 M/UL (ref 4–5.2)
RBC # BLD AUTO: 3 M/UL (ref 4–5.2)
SODIUM SERPL-SCNC: 140 MMOL/L (ref 136–145)
SODIUM SERPL-SCNC: 141 MMOL/L (ref 136–145)
SODIUM SERPL-SCNC: 146 MMOL/L (ref 136–145)
WBC # BLD AUTO: 3.9 K/UL (ref 4–11)
WBC # BLD AUTO: 4.5 K/UL (ref 4–11)

## 2023-04-26 PROCEDURE — 92928 PRQ TCAT PLMT NTRAC ST 1 LES: CPT

## 2023-04-26 PROCEDURE — 99223 1ST HOSP IP/OBS HIGH 75: CPT | Performed by: THORACIC SURGERY (CARDIOTHORACIC VASCULAR SURGERY)

## 2023-04-26 PROCEDURE — 6370000000 HC RX 637 (ALT 250 FOR IP)

## 2023-04-26 PROCEDURE — C1725 CATH, TRANSLUMIN NON-LASER: HCPCS

## 2023-04-26 PROCEDURE — 2500000003 HC RX 250 WO HCPCS

## 2023-04-26 PROCEDURE — 2060000000 HC ICU INTERMEDIATE R&B

## 2023-04-26 PROCEDURE — 6370000000 HC RX 637 (ALT 250 FOR IP): Performed by: INTERNAL MEDICINE

## 2023-04-26 PROCEDURE — 027237Z DILATION OF CORONARY ARTERY, THREE ARTERIES WITH FOUR OR MORE DRUG-ELUTING INTRALUMINAL DEVICES, PERCUTANEOUS APPROACH: ICD-10-PCS | Performed by: INTERNAL MEDICINE

## 2023-04-26 PROCEDURE — 85025 COMPLETE CBC W/AUTO DIFF WBC: CPT

## 2023-04-26 PROCEDURE — 4A023N7 MEASUREMENT OF CARDIAC SAMPLING AND PRESSURE, LEFT HEART, PERCUTANEOUS APPROACH: ICD-10-PCS | Performed by: INTERNAL MEDICINE

## 2023-04-26 PROCEDURE — 2580000003 HC RX 258: Performed by: INTERNAL MEDICINE

## 2023-04-26 PROCEDURE — 93005 ELECTROCARDIOGRAM TRACING: CPT | Performed by: INTERNAL MEDICINE

## 2023-04-26 PROCEDURE — 94761 N-INVAS EAR/PLS OXIMETRY MLT: CPT

## 2023-04-26 PROCEDURE — B2111ZZ FLUOROSCOPY OF MULTIPLE CORONARY ARTERIES USING LOW OSMOLAR CONTRAST: ICD-10-PCS | Performed by: INTERNAL MEDICINE

## 2023-04-26 PROCEDURE — C1761 HC CATH TRANSLUM INTRAVASCULAR LITHOTRIPSY CORONARY: HCPCS

## 2023-04-26 PROCEDURE — 6360000002 HC RX W HCPCS: Performed by: INTERNAL MEDICINE

## 2023-04-26 PROCEDURE — C1874 STENT, COATED/COV W/DEL SYS: HCPCS

## 2023-04-26 PROCEDURE — C1713 ANCHOR/SCREW BN/BN,TIS/BN: HCPCS

## 2023-04-26 PROCEDURE — 2709999900 HC NON-CHARGEABLE SUPPLY

## 2023-04-26 PROCEDURE — 85027 COMPLETE CBC AUTOMATED: CPT

## 2023-04-26 PROCEDURE — 99153 MOD SED SAME PHYS/QHP EA: CPT

## 2023-04-26 PROCEDURE — 2580000003 HC RX 258

## 2023-04-26 PROCEDURE — 99232 SBSQ HOSP IP/OBS MODERATE 35: CPT | Performed by: NURSE PRACTITIONER

## 2023-04-26 PROCEDURE — 0715T HC PERQ TRLUML CORONRY LITHOTRP: CPT

## 2023-04-26 PROCEDURE — 99152 MOD SED SAME PHYS/QHP 5/>YRS: CPT

## 2023-04-26 PROCEDURE — 2700000000 HC OXYGEN THERAPY PER DAY

## 2023-04-26 PROCEDURE — 80069 RENAL FUNCTION PANEL: CPT

## 2023-04-26 PROCEDURE — 6360000002 HC RX W HCPCS: Performed by: STUDENT IN AN ORGANIZED HEALTH CARE EDUCATION/TRAINING PROGRAM

## 2023-04-26 PROCEDURE — 6360000002 HC RX W HCPCS

## 2023-04-26 PROCEDURE — C1894 INTRO/SHEATH, NON-LASER: HCPCS

## 2023-04-26 PROCEDURE — 83735 ASSAY OF MAGNESIUM: CPT

## 2023-04-26 PROCEDURE — C1887 CATHETER, GUIDING: HCPCS

## 2023-04-26 PROCEDURE — 92929 HC PRQ CARD STENT W/ANGIO ADDL: CPT

## 2023-04-26 PROCEDURE — 36415 COLL VENOUS BLD VENIPUNCTURE: CPT

## 2023-04-26 PROCEDURE — C1769 GUIDE WIRE: HCPCS

## 2023-04-26 PROCEDURE — 6360000004 HC RX CONTRAST MEDICATION: Performed by: INTERNAL MEDICINE

## 2023-04-26 RX ORDER — SODIUM CHLORIDE 0.9 % (FLUSH) 0.9 %
5-40 SYRINGE (ML) INJECTION PRN
Status: DISCONTINUED | OUTPATIENT
Start: 2023-04-26 | End: 2023-05-03 | Stop reason: HOSPADM

## 2023-04-26 RX ORDER — EPTIFIBATIDE 0.75 MG/ML
2 INJECTION, SOLUTION INTRAVENOUS CONTINUOUS
Status: DISPENSED | OUTPATIENT
Start: 2023-04-26 | End: 2023-04-27

## 2023-04-26 RX ORDER — ACETAMINOPHEN 325 MG/1
650 TABLET ORAL EVERY 4 HOURS PRN
Status: DISCONTINUED | OUTPATIENT
Start: 2023-04-26 | End: 2023-05-03 | Stop reason: HOSPADM

## 2023-04-26 RX ORDER — SODIUM CHLORIDE 0.9 % (FLUSH) 0.9 %
5-40 SYRINGE (ML) INJECTION EVERY 12 HOURS SCHEDULED
Status: DISCONTINUED | OUTPATIENT
Start: 2023-04-26 | End: 2023-05-03 | Stop reason: HOSPADM

## 2023-04-26 RX ORDER — SODIUM CHLORIDE 9 MG/ML
INJECTION, SOLUTION INTRAVENOUS PRN
Status: DISCONTINUED | OUTPATIENT
Start: 2023-04-26 | End: 2023-05-03 | Stop reason: HOSPADM

## 2023-04-26 RX ORDER — LISINOPRIL 2.5 MG/1
2.5 TABLET ORAL DAILY
Status: DISCONTINUED | OUTPATIENT
Start: 2023-04-26 | End: 2023-04-27

## 2023-04-26 RX ORDER — MAGNESIUM SULFATE IN WATER 40 MG/ML
2000 INJECTION, SOLUTION INTRAVENOUS ONCE
Status: COMPLETED | OUTPATIENT
Start: 2023-04-26 | End: 2023-04-26

## 2023-04-26 RX ADMIN — SODIUM CHLORIDE 25 ML: 9 INJECTION, SOLUTION INTRAVENOUS at 03:47

## 2023-04-26 RX ADMIN — AMIODARONE HYDROCHLORIDE 200 MG: 200 TABLET ORAL at 20:45

## 2023-04-26 RX ADMIN — AMPICILLIN SODIUM AND SULBACTAM SODIUM 3000 MG: 2; 1 INJECTION, POWDER, FOR SOLUTION INTRAMUSCULAR; INTRAVENOUS at 10:27

## 2023-04-26 RX ADMIN — TICAGRELOR 90 MG: 90 TABLET ORAL at 20:45

## 2023-04-26 RX ADMIN — SODIUM CHLORIDE 25 ML: 9 INJECTION, SOLUTION INTRAVENOUS at 22:27

## 2023-04-26 RX ADMIN — FUROSEMIDE 40 MG: 10 INJECTION, SOLUTION INTRAMUSCULAR; INTRAVENOUS at 09:09

## 2023-04-26 RX ADMIN — METOPROLOL TARTRATE 50 MG: 50 TABLET, FILM COATED ORAL at 08:59

## 2023-04-26 RX ADMIN — MAGNESIUM SULFATE HEPTAHYDRATE 2000 MG: 40 INJECTION, SOLUTION INTRAVENOUS at 05:00

## 2023-04-26 RX ADMIN — Medication 10 ML: at 09:28

## 2023-04-26 RX ADMIN — POLYETHYLENE GLYCOL 3350 17 G: 17 POWDER, FOR SOLUTION ORAL at 09:35

## 2023-04-26 RX ADMIN — IOHEXOL 350 ML: 350 INJECTION, SOLUTION INTRAVENOUS at 16:20

## 2023-04-26 RX ADMIN — AMPICILLIN SODIUM AND SULBACTAM SODIUM 3000 MG: 2; 1 INJECTION, POWDER, FOR SOLUTION INTRAMUSCULAR; INTRAVENOUS at 18:21

## 2023-04-26 RX ADMIN — Medication 1 CAPSULE: at 08:58

## 2023-04-26 RX ADMIN — CLOPIDOGREL BISULFATE 75 MG: 75 TABLET ORAL at 08:58

## 2023-04-26 RX ADMIN — MICONAZOLE NITRATE: 20 POWDER TOPICAL at 09:12

## 2023-04-26 RX ADMIN — ASPIRIN 81 MG: 81 TABLET, COATED ORAL at 08:59

## 2023-04-26 RX ADMIN — EPTIFIBATIDE 2 MCG/KG/MIN: 0.75 INJECTION INTRAVENOUS at 19:19

## 2023-04-26 RX ADMIN — METOPROLOL TARTRATE 50 MG: 50 TABLET, FILM COATED ORAL at 20:44

## 2023-04-26 RX ADMIN — SODIUM CHLORIDE 25 ML: 9 INJECTION, SOLUTION INTRAVENOUS at 05:00

## 2023-04-26 RX ADMIN — SODIUM CHLORIDE, PRESERVATIVE FREE 10 ML: 5 INJECTION INTRAVENOUS at 09:27

## 2023-04-26 RX ADMIN — POTASSIUM BICARBONATE 40 MEQ: 782 TABLET, EFFERVESCENT ORAL at 05:03

## 2023-04-26 RX ADMIN — AMPICILLIN SODIUM AND SULBACTAM SODIUM 3000 MG: 2; 1 INJECTION, POWDER, FOR SOLUTION INTRAMUSCULAR; INTRAVENOUS at 22:27

## 2023-04-26 RX ADMIN — FUROSEMIDE 40 MG: 10 INJECTION, SOLUTION INTRAMUSCULAR; INTRAVENOUS at 17:58

## 2023-04-26 RX ADMIN — AMIODARONE HYDROCHLORIDE 200 MG: 200 TABLET ORAL at 08:59

## 2023-04-26 RX ADMIN — SODIUM CHLORIDE, PRESERVATIVE FREE 10 ML: 5 INJECTION INTRAVENOUS at 23:15

## 2023-04-26 RX ADMIN — AMPICILLIN SODIUM AND SULBACTAM SODIUM 3000 MG: 2; 1 INJECTION, POWDER, FOR SOLUTION INTRAMUSCULAR; INTRAVENOUS at 03:48

## 2023-04-26 ASSESSMENT — PAIN SCALES - GENERAL
PAINLEVEL_OUTOF10: 0
PAINLEVEL_OUTOF10: 0

## 2023-04-27 LAB
ALBUMIN SERPL-MCNC: 2.6 G/DL (ref 3.4–5)
ANION GAP SERPL CALCULATED.3IONS-SCNC: 11 MMOL/L (ref 3–16)
ANION GAP SERPL CALCULATED.3IONS-SCNC: 4 MMOL/L (ref 3–16)
ANION GAP SERPL CALCULATED.3IONS-SCNC: 5 MMOL/L (ref 3–16)
ANION GAP SERPL CALCULATED.3IONS-SCNC: 6 MMOL/L (ref 3–16)
BASOPHILS # BLD: 0 K/UL (ref 0–0.2)
BASOPHILS NFR BLD: 0.8 %
BUN SERPL-MCNC: 10 MG/DL (ref 7–20)
BUN SERPL-MCNC: 10 MG/DL (ref 7–20)
BUN SERPL-MCNC: 11 MG/DL (ref 7–20)
BUN SERPL-MCNC: 11 MG/DL (ref 7–20)
CALCIUM SERPL-MCNC: 8.5 MG/DL (ref 8.3–10.6)
CALCIUM SERPL-MCNC: 8.6 MG/DL (ref 8.3–10.6)
CHLORIDE SERPL-SCNC: 100 MMOL/L (ref 99–110)
CHLORIDE SERPL-SCNC: 101 MMOL/L (ref 99–110)
CHLORIDE SERPL-SCNC: 103 MMOL/L (ref 99–110)
CHLORIDE SERPL-SCNC: 104 MMOL/L (ref 99–110)
CO2 SERPL-SCNC: 32 MMOL/L (ref 21–32)
CO2 SERPL-SCNC: 36 MMOL/L (ref 21–32)
CREAT SERPL-MCNC: <0.5 MG/DL (ref 0.6–1.2)
DEPRECATED RDW RBC AUTO: 16.5 % (ref 12.4–15.4)
EKG ATRIAL RATE: 76 BPM
EKG DIAGNOSIS: NORMAL
EKG P AXIS: 50 DEGREES
EKG P-R INTERVAL: 160 MS
EKG Q-T INTERVAL: 448 MS
EKG QRS DURATION: 96 MS
EKG QTC CALCULATION (BAZETT): 504 MS
EKG R AXIS: -5 DEGREES
EKG T AXIS: 254 DEGREES
EKG VENTRICULAR RATE: 76 BPM
EOSINOPHIL # BLD: 0.1 K/UL (ref 0–0.6)
EOSINOPHIL NFR BLD: 2.2 %
GFR SERPLBLD CREATININE-BSD FMLA CKD-EPI: >60 ML/MIN/{1.73_M2}
GLUCOSE BLD-MCNC: 176 MG/DL (ref 70–99)
GLUCOSE BLD-MCNC: 177 MG/DL (ref 70–99)
GLUCOSE BLD-MCNC: 192 MG/DL (ref 70–99)
GLUCOSE BLD-MCNC: 224 MG/DL (ref 70–99)
GLUCOSE SERPL-MCNC: 162 MG/DL (ref 70–99)
GLUCOSE SERPL-MCNC: 164 MG/DL (ref 70–99)
GLUCOSE SERPL-MCNC: 165 MG/DL (ref 70–99)
GLUCOSE SERPL-MCNC: 250 MG/DL (ref 70–99)
HCT VFR BLD AUTO: 24 % (ref 36–48)
HGB BLD-MCNC: 7.8 G/DL (ref 12–16)
LYMPHOCYTES # BLD: 0.7 K/UL (ref 1–5.1)
LYMPHOCYTES NFR BLD: 16.7 %
MAGNESIUM SERPL-MCNC: 1.7 MG/DL (ref 1.8–2.4)
MAGNESIUM SERPL-MCNC: 1.9 MG/DL (ref 1.8–2.4)
MAGNESIUM SERPL-MCNC: 1.9 MG/DL (ref 1.8–2.4)
MCH RBC QN AUTO: 27.3 PG (ref 26–34)
MCHC RBC AUTO-ENTMCNC: 32.6 G/DL (ref 31–36)
MCV RBC AUTO: 83.8 FL (ref 80–100)
MONOCYTES # BLD: 0.4 K/UL (ref 0–1.3)
MONOCYTES NFR BLD: 9.4 %
NEUTROPHILS # BLD: 2.9 K/UL (ref 1.7–7.7)
NEUTROPHILS NFR BLD: 70.9 %
PERFORMED ON: ABNORMAL
PHOSPHATE SERPL-MCNC: 3.3 MG/DL (ref 2.5–4.9)
PHOSPHATE SERPL-MCNC: 3.5 MG/DL (ref 2.5–4.9)
PHOSPHATE SERPL-MCNC: 3.7 MG/DL (ref 2.5–4.9)
PLATELET # BLD AUTO: 310 K/UL (ref 135–450)
PMV BLD AUTO: 6.8 FL (ref 5–10.5)
POTASSIUM SERPL-SCNC: 3.3 MMOL/L (ref 3.5–5.1)
POTASSIUM SERPL-SCNC: 3.3 MMOL/L (ref 3.5–5.1)
POTASSIUM SERPL-SCNC: 3.4 MMOL/L (ref 3.5–5.1)
POTASSIUM SERPL-SCNC: 3.6 MMOL/L (ref 3.5–5.1)
RBC # BLD AUTO: 2.87 M/UL (ref 4–5.2)
SODIUM SERPL-SCNC: 142 MMOL/L (ref 136–145)
SODIUM SERPL-SCNC: 144 MMOL/L (ref 136–145)
WBC # BLD AUTO: 4.1 K/UL (ref 4–11)

## 2023-04-27 PROCEDURE — 6370000000 HC RX 637 (ALT 250 FOR IP): Performed by: INTERNAL MEDICINE

## 2023-04-27 PROCEDURE — 2060000000 HC ICU INTERMEDIATE R&B

## 2023-04-27 PROCEDURE — 2580000003 HC RX 258: Performed by: INTERNAL MEDICINE

## 2023-04-27 PROCEDURE — 6360000002 HC RX W HCPCS: Performed by: STUDENT IN AN ORGANIZED HEALTH CARE EDUCATION/TRAINING PROGRAM

## 2023-04-27 PROCEDURE — 6360000002 HC RX W HCPCS

## 2023-04-27 PROCEDURE — 6370000000 HC RX 637 (ALT 250 FOR IP)

## 2023-04-27 PROCEDURE — 6360000002 HC RX W HCPCS: Performed by: INTERNAL MEDICINE

## 2023-04-27 PROCEDURE — 85025 COMPLETE CBC W/AUTO DIFF WBC: CPT

## 2023-04-27 PROCEDURE — 6370000000 HC RX 637 (ALT 250 FOR IP): Performed by: NURSE PRACTITIONER

## 2023-04-27 PROCEDURE — 99232 SBSQ HOSP IP/OBS MODERATE 35: CPT | Performed by: INTERNAL MEDICINE

## 2023-04-27 PROCEDURE — 80069 RENAL FUNCTION PANEL: CPT

## 2023-04-27 PROCEDURE — 99233 SBSQ HOSP IP/OBS HIGH 50: CPT | Performed by: INTERNAL MEDICINE

## 2023-04-27 PROCEDURE — 97535 SELF CARE MNGMENT TRAINING: CPT

## 2023-04-27 PROCEDURE — 97530 THERAPEUTIC ACTIVITIES: CPT

## 2023-04-27 PROCEDURE — 83735 ASSAY OF MAGNESIUM: CPT

## 2023-04-27 PROCEDURE — 93010 ELECTROCARDIOGRAM REPORT: CPT | Performed by: INTERNAL MEDICINE

## 2023-04-27 PROCEDURE — 99233 SBSQ HOSP IP/OBS HIGH 50: CPT | Performed by: NURSE PRACTITIONER

## 2023-04-27 RX ORDER — LOSARTAN POTASSIUM 25 MG/1
25 TABLET ORAL DAILY
Status: DISCONTINUED | OUTPATIENT
Start: 2023-04-28 | End: 2023-05-03 | Stop reason: HOSPADM

## 2023-04-27 RX ORDER — CLOPIDOGREL BISULFATE 75 MG/1
75 TABLET ORAL DAILY
Status: DISCONTINUED | OUTPATIENT
Start: 2023-04-28 | End: 2023-05-03 | Stop reason: HOSPADM

## 2023-04-27 RX ORDER — INSULIN LISPRO 100 [IU]/ML
0-4 INJECTION, SOLUTION INTRAVENOUS; SUBCUTANEOUS NIGHTLY
Status: DISCONTINUED | OUTPATIENT
Start: 2023-04-27 | End: 2023-05-03 | Stop reason: HOSPADM

## 2023-04-27 RX ORDER — MAGNESIUM SULFATE IN WATER 40 MG/ML
2000 INJECTION, SOLUTION INTRAVENOUS ONCE
Status: COMPLETED | OUTPATIENT
Start: 2023-04-27 | End: 2023-04-27

## 2023-04-27 RX ORDER — POTASSIUM CHLORIDE 20 MEQ/1
40 TABLET, EXTENDED RELEASE ORAL ONCE
Status: DISCONTINUED | OUTPATIENT
Start: 2023-04-27 | End: 2023-04-27

## 2023-04-27 RX ORDER — INSULIN LISPRO 100 [IU]/ML
0-16 INJECTION, SOLUTION INTRAVENOUS; SUBCUTANEOUS
Status: DISCONTINUED | OUTPATIENT
Start: 2023-04-27 | End: 2023-05-03 | Stop reason: HOSPADM

## 2023-04-27 RX ORDER — AMIODARONE HYDROCHLORIDE 200 MG/1
200 TABLET ORAL DAILY
Status: DISCONTINUED | OUTPATIENT
Start: 2023-04-28 | End: 2023-05-03 | Stop reason: HOSPADM

## 2023-04-27 RX ORDER — ATORVASTATIN CALCIUM 40 MG/1
40 TABLET, FILM COATED ORAL NIGHTLY
Status: DISCONTINUED | OUTPATIENT
Start: 2023-04-27 | End: 2023-05-03 | Stop reason: HOSPADM

## 2023-04-27 RX ORDER — EPTIFIBATIDE 0.75 MG/ML
2 INJECTION, SOLUTION INTRAVENOUS CONTINUOUS
Status: DISPENSED | OUTPATIENT
Start: 2023-04-27 | End: 2023-04-27

## 2023-04-27 RX ADMIN — MICONAZOLE NITRATE: 20 POWDER TOPICAL at 21:30

## 2023-04-27 RX ADMIN — COLLAGENASE SANTYL: 250 OINTMENT TOPICAL at 11:07

## 2023-04-27 RX ADMIN — TICAGRELOR 90 MG: 90 TABLET ORAL at 08:16

## 2023-04-27 RX ADMIN — AMIODARONE HYDROCHLORIDE 200 MG: 200 TABLET ORAL at 08:15

## 2023-04-27 RX ADMIN — ASPIRIN 81 MG: 81 TABLET, COATED ORAL at 08:16

## 2023-04-27 RX ADMIN — MAGNESIUM SULFATE HEPTAHYDRATE 2000 MG: 40 INJECTION, SOLUTION INTRAVENOUS at 11:12

## 2023-04-27 RX ADMIN — POTASSIUM BICARBONATE 40 MEQ: 782 TABLET, EFFERVESCENT ORAL at 08:17

## 2023-04-27 RX ADMIN — SODIUM CHLORIDE, PRESERVATIVE FREE 10 ML: 5 INJECTION INTRAVENOUS at 08:16

## 2023-04-27 RX ADMIN — SODIUM CHLORIDE, PRESERVATIVE FREE 10 ML: 5 INJECTION INTRAVENOUS at 10:00

## 2023-04-27 RX ADMIN — SODIUM CHLORIDE 25 ML: 9 INJECTION, SOLUTION INTRAVENOUS at 06:15

## 2023-04-27 RX ADMIN — POTASSIUM BICARBONATE 40 MEQ: 782 TABLET, EFFERVESCENT ORAL at 16:41

## 2023-04-27 RX ADMIN — SODIUM CHLORIDE: 9 INJECTION, SOLUTION INTRAVENOUS at 09:51

## 2023-04-27 RX ADMIN — LISINOPRIL 2.5 MG: 2.5 TABLET ORAL at 08:15

## 2023-04-27 RX ADMIN — SODIUM CHLORIDE, PRESERVATIVE FREE 10 ML: 5 INJECTION INTRAVENOUS at 08:20

## 2023-04-27 RX ADMIN — FUROSEMIDE 40 MG: 10 INJECTION, SOLUTION INTRAMUSCULAR; INTRAVENOUS at 08:15

## 2023-04-27 RX ADMIN — SODIUM CHLORIDE, PRESERVATIVE FREE 10 ML: 5 INJECTION INTRAVENOUS at 21:29

## 2023-04-27 RX ADMIN — AMPICILLIN SODIUM AND SULBACTAM SODIUM 3000 MG: 2; 1 INJECTION, POWDER, FOR SOLUTION INTRAMUSCULAR; INTRAVENOUS at 09:58

## 2023-04-27 RX ADMIN — AMPICILLIN SODIUM AND SULBACTAM SODIUM 3000 MG: 2; 1 INJECTION, POWDER, FOR SOLUTION INTRAMUSCULAR; INTRAVENOUS at 03:42

## 2023-04-27 RX ADMIN — MICONAZOLE NITRATE: 20 POWDER TOPICAL at 08:16

## 2023-04-27 RX ADMIN — METOPROLOL TARTRATE 50 MG: 50 TABLET, FILM COATED ORAL at 21:27

## 2023-04-27 RX ADMIN — EPTIFIBATIDE 2 MCG/KG/MIN: 0.75 INJECTION INTRAVENOUS at 03:36

## 2023-04-27 RX ADMIN — METOPROLOL TARTRATE 50 MG: 50 TABLET, FILM COATED ORAL at 08:15

## 2023-04-27 RX ADMIN — FUROSEMIDE 40 MG: 10 INJECTION, SOLUTION INTRAMUSCULAR; INTRAVENOUS at 18:06

## 2023-04-27 RX ADMIN — POLYETHYLENE GLYCOL 3350 17 G: 17 POWDER, FOR SOLUTION ORAL at 08:16

## 2023-04-27 RX ADMIN — INSULIN GLARGINE 5 UNITS: 100 INJECTION, SOLUTION SUBCUTANEOUS at 21:51

## 2023-04-27 RX ADMIN — INSULIN LISPRO 0 UNITS: 100 INJECTION, SOLUTION INTRAVENOUS; SUBCUTANEOUS at 21:52

## 2023-04-27 RX ADMIN — CEFTRIAXONE SODIUM 2000 MG: 2 INJECTION, POWDER, FOR SOLUTION INTRAMUSCULAR; INTRAVENOUS at 16:45

## 2023-04-27 RX ADMIN — ATORVASTATIN CALCIUM 40 MG: 40 TABLET, FILM COATED ORAL at 21:29

## 2023-04-27 RX ADMIN — Medication 1 CAPSULE: at 08:15

## 2023-04-27 RX ADMIN — SODIUM CHLORIDE 25 ML: 9 INJECTION, SOLUTION INTRAVENOUS at 03:41

## 2023-04-27 RX ADMIN — SODIUM CHLORIDE, PRESERVATIVE FREE 10 ML: 5 INJECTION INTRAVENOUS at 08:21

## 2023-04-27 ASSESSMENT — PAIN SCALES - GENERAL: PAINLEVEL_OUTOF10: 0

## 2023-04-28 ENCOUNTER — ANESTHESIA EVENT (OUTPATIENT)
Dept: OPERATING ROOM | Age: 63
DRG: 622 | End: 2023-04-28
Payer: COMMERCIAL

## 2023-04-28 LAB
ALBUMIN SERPL-MCNC: 2.6 G/DL (ref 3.4–5)
ALBUMIN SERPL-MCNC: 2.8 G/DL (ref 3.4–5)
ANION GAP SERPL CALCULATED.3IONS-SCNC: 6 MMOL/L (ref 3–16)
ANION GAP SERPL CALCULATED.3IONS-SCNC: 8 MMOL/L (ref 3–16)
BASOPHILS # BLD: 0 K/UL (ref 0–0.2)
BASOPHILS NFR BLD: 0.8 %
BUN SERPL-MCNC: 12 MG/DL (ref 7–20)
BUN SERPL-MCNC: 12 MG/DL (ref 7–20)
CALCIUM SERPL-MCNC: 8.5 MG/DL (ref 8.3–10.6)
CALCIUM SERPL-MCNC: 8.7 MG/DL (ref 8.3–10.6)
CHLORIDE SERPL-SCNC: 101 MMOL/L (ref 99–110)
CHLORIDE SERPL-SCNC: 103 MMOL/L (ref 99–110)
CO2 SERPL-SCNC: 34 MMOL/L (ref 21–32)
CO2 SERPL-SCNC: 36 MMOL/L (ref 21–32)
CREAT SERPL-MCNC: <0.5 MG/DL (ref 0.6–1.2)
CREAT SERPL-MCNC: <0.5 MG/DL (ref 0.6–1.2)
CRP SERPL-MCNC: 22.3 MG/L (ref 0–5.1)
DEPRECATED RDW RBC AUTO: 17 % (ref 12.4–15.4)
EOSINOPHIL # BLD: 0.1 K/UL (ref 0–0.6)
EOSINOPHIL NFR BLD: 2 %
ERYTHROCYTE [SEDIMENTATION RATE] IN BLOOD BY WESTERGREN METHOD: 33 MM/HR (ref 0–30)
GFR SERPLBLD CREATININE-BSD FMLA CKD-EPI: >60 ML/MIN/{1.73_M2}
GFR SERPLBLD CREATININE-BSD FMLA CKD-EPI: >60 ML/MIN/{1.73_M2}
GLUCOSE BLD-MCNC: 150 MG/DL (ref 70–99)
GLUCOSE BLD-MCNC: 181 MG/DL (ref 70–99)
GLUCOSE BLD-MCNC: 214 MG/DL (ref 70–99)
GLUCOSE BLD-MCNC: 250 MG/DL (ref 70–99)
GLUCOSE SERPL-MCNC: 175 MG/DL (ref 70–99)
GLUCOSE SERPL-MCNC: 203 MG/DL (ref 70–99)
HCT VFR BLD AUTO: 24.3 % (ref 36–48)
HGB BLD-MCNC: 7.9 G/DL (ref 12–16)
LYMPHOCYTES # BLD: 1 K/UL (ref 1–5.1)
LYMPHOCYTES NFR BLD: 22.7 %
MAGNESIUM SERPL-MCNC: 1.9 MG/DL (ref 1.8–2.4)
MAGNESIUM SERPL-MCNC: 2.1 MG/DL (ref 1.8–2.4)
MCH RBC QN AUTO: 27.2 PG (ref 26–34)
MCHC RBC AUTO-ENTMCNC: 32.4 G/DL (ref 31–36)
MCV RBC AUTO: 83.9 FL (ref 80–100)
MONOCYTES # BLD: 0.4 K/UL (ref 0–1.3)
MONOCYTES NFR BLD: 9.5 %
NEUTROPHILS # BLD: 2.7 K/UL (ref 1.7–7.7)
NEUTROPHILS NFR BLD: 65 %
NT-PROBNP SERPL-MCNC: 9290 PG/ML (ref 0–124)
PERFORMED ON: ABNORMAL
PHOSPHATE SERPL-MCNC: 3.8 MG/DL (ref 2.5–4.9)
PHOSPHATE SERPL-MCNC: 4 MG/DL (ref 2.5–4.9)
PLATELET # BLD AUTO: 306 K/UL (ref 135–450)
PMV BLD AUTO: 6.7 FL (ref 5–10.5)
POTASSIUM SERPL-SCNC: 3.4 MMOL/L (ref 3.5–5.1)
POTASSIUM SERPL-SCNC: 3.6 MMOL/L (ref 3.5–5.1)
RBC # BLD AUTO: 2.9 M/UL (ref 4–5.2)
SODIUM SERPL-SCNC: 143 MMOL/L (ref 136–145)
SODIUM SERPL-SCNC: 145 MMOL/L (ref 136–145)
WBC # BLD AUTO: 4.2 K/UL (ref 4–11)

## 2023-04-28 PROCEDURE — 97530 THERAPEUTIC ACTIVITIES: CPT

## 2023-04-28 PROCEDURE — 6360000002 HC RX W HCPCS

## 2023-04-28 PROCEDURE — 2060000000 HC ICU INTERMEDIATE R&B

## 2023-04-28 PROCEDURE — 85652 RBC SED RATE AUTOMATED: CPT

## 2023-04-28 PROCEDURE — 6360000002 HC RX W HCPCS: Performed by: STUDENT IN AN ORGANIZED HEALTH CARE EDUCATION/TRAINING PROGRAM

## 2023-04-28 PROCEDURE — 2580000003 HC RX 258: Performed by: INTERNAL MEDICINE

## 2023-04-28 PROCEDURE — 85025 COMPLETE CBC W/AUTO DIFF WBC: CPT

## 2023-04-28 PROCEDURE — 6370000000 HC RX 637 (ALT 250 FOR IP): Performed by: INTERNAL MEDICINE

## 2023-04-28 PROCEDURE — 83735 ASSAY OF MAGNESIUM: CPT

## 2023-04-28 PROCEDURE — 83880 ASSAY OF NATRIURETIC PEPTIDE: CPT

## 2023-04-28 PROCEDURE — 6370000000 HC RX 637 (ALT 250 FOR IP)

## 2023-04-28 PROCEDURE — 6360000002 HC RX W HCPCS: Performed by: INTERNAL MEDICINE

## 2023-04-28 PROCEDURE — 86140 C-REACTIVE PROTEIN: CPT

## 2023-04-28 PROCEDURE — 99232 SBSQ HOSP IP/OBS MODERATE 35: CPT | Performed by: NURSE PRACTITIONER

## 2023-04-28 PROCEDURE — 6370000000 HC RX 637 (ALT 250 FOR IP): Performed by: NURSE PRACTITIONER

## 2023-04-28 PROCEDURE — 80069 RENAL FUNCTION PANEL: CPT

## 2023-04-28 RX ORDER — POTASSIUM CHLORIDE 20 MEQ/1
40 TABLET, EXTENDED RELEASE ORAL 2 TIMES DAILY WITH MEALS
Status: DISCONTINUED | OUTPATIENT
Start: 2023-04-28 | End: 2023-04-28

## 2023-04-28 RX ORDER — MAGNESIUM SULFATE IN WATER 40 MG/ML
2000 INJECTION, SOLUTION INTRAVENOUS ONCE
Status: COMPLETED | OUTPATIENT
Start: 2023-04-28 | End: 2023-04-28

## 2023-04-28 RX ADMIN — MICONAZOLE NITRATE: 20 POWDER TOPICAL at 21:13

## 2023-04-28 RX ADMIN — INSULIN GLARGINE 5 UNITS: 100 INJECTION, SOLUTION SUBCUTANEOUS at 21:46

## 2023-04-28 RX ADMIN — ATORVASTATIN CALCIUM 40 MG: 40 TABLET, FILM COATED ORAL at 21:38

## 2023-04-28 RX ADMIN — POLYETHYLENE GLYCOL 3350 17 G: 17 POWDER, FOR SOLUTION ORAL at 10:34

## 2023-04-28 RX ADMIN — POTASSIUM BICARBONATE 40 MEQ: 782 TABLET, EFFERVESCENT ORAL at 06:07

## 2023-04-28 RX ADMIN — AMIODARONE HYDROCHLORIDE 200 MG: 200 TABLET ORAL at 10:30

## 2023-04-28 RX ADMIN — FUROSEMIDE 40 MG: 10 INJECTION, SOLUTION INTRAMUSCULAR; INTRAVENOUS at 10:33

## 2023-04-28 RX ADMIN — CEFTRIAXONE SODIUM 2000 MG: 2 INJECTION, POWDER, FOR SOLUTION INTRAMUSCULAR; INTRAVENOUS at 18:04

## 2023-04-28 RX ADMIN — INSULIN LISPRO 4 UNITS: 100 INJECTION, SOLUTION INTRAVENOUS; SUBCUTANEOUS at 18:10

## 2023-04-28 RX ADMIN — FUROSEMIDE 40 MG: 10 INJECTION, SOLUTION INTRAMUSCULAR; INTRAVENOUS at 18:00

## 2023-04-28 RX ADMIN — Medication 1 CAPSULE: at 10:31

## 2023-04-28 RX ADMIN — POTASSIUM BICARBONATE 40 MEQ: 391 TABLET, EFFERVESCENT ORAL at 21:38

## 2023-04-28 RX ADMIN — ASPIRIN 81 MG: 81 TABLET, COATED ORAL at 10:32

## 2023-04-28 RX ADMIN — LOSARTAN POTASSIUM 25 MG: 25 TABLET, FILM COATED ORAL at 10:42

## 2023-04-28 RX ADMIN — METOPROLOL TARTRATE 50 MG: 50 TABLET, FILM COATED ORAL at 10:32

## 2023-04-28 RX ADMIN — MICONAZOLE NITRATE: 20 POWDER TOPICAL at 10:37

## 2023-04-28 RX ADMIN — COLLAGENASE SANTYL: 250 OINTMENT TOPICAL at 10:38

## 2023-04-28 RX ADMIN — METOPROLOL TARTRATE 50 MG: 50 TABLET, FILM COATED ORAL at 21:37

## 2023-04-28 RX ADMIN — MAGNESIUM SULFATE HEPTAHYDRATE 2000 MG: 40 INJECTION, SOLUTION INTRAVENOUS at 06:06

## 2023-04-28 RX ADMIN — CLOPIDOGREL BISULFATE 75 MG: 75 TABLET ORAL at 10:29

## 2023-04-28 RX ADMIN — SODIUM CHLORIDE, PRESERVATIVE FREE 10 ML: 5 INJECTION INTRAVENOUS at 21:36

## 2023-04-28 ASSESSMENT — PAIN SCALES - GENERAL: PAINLEVEL_OUTOF10: 0

## 2023-04-29 LAB
ALBUMIN SERPL-MCNC: 2.7 G/DL (ref 3.4–5)
ALBUMIN SERPL-MCNC: 2.7 G/DL (ref 3.4–5)
ANION GAP SERPL CALCULATED.3IONS-SCNC: 7 MMOL/L (ref 3–16)
ANION GAP SERPL CALCULATED.3IONS-SCNC: 8 MMOL/L (ref 3–16)
BASOPHILS # BLD: 0 K/UL (ref 0–0.2)
BASOPHILS NFR BLD: 0.8 %
BUN SERPL-MCNC: 11 MG/DL (ref 7–20)
BUN SERPL-MCNC: 12 MG/DL (ref 7–20)
CALCIUM SERPL-MCNC: 8.7 MG/DL (ref 8.3–10.6)
CALCIUM SERPL-MCNC: 8.8 MG/DL (ref 8.3–10.6)
CHLORIDE SERPL-SCNC: 102 MMOL/L (ref 99–110)
CHLORIDE SERPL-SCNC: 104 MMOL/L (ref 99–110)
CO2 SERPL-SCNC: 33 MMOL/L (ref 21–32)
CO2 SERPL-SCNC: 33 MMOL/L (ref 21–32)
CREAT SERPL-MCNC: <0.5 MG/DL (ref 0.6–1.2)
CREAT SERPL-MCNC: <0.5 MG/DL (ref 0.6–1.2)
DEPRECATED RDW RBC AUTO: 16.7 % (ref 12.4–15.4)
EOSINOPHIL # BLD: 0.1 K/UL (ref 0–0.6)
EOSINOPHIL NFR BLD: 1.9 %
GFR SERPLBLD CREATININE-BSD FMLA CKD-EPI: >60 ML/MIN/{1.73_M2}
GFR SERPLBLD CREATININE-BSD FMLA CKD-EPI: >60 ML/MIN/{1.73_M2}
GLUCOSE BLD-MCNC: 174 MG/DL (ref 70–99)
GLUCOSE BLD-MCNC: 181 MG/DL (ref 70–99)
GLUCOSE BLD-MCNC: 215 MG/DL (ref 70–99)
GLUCOSE BLD-MCNC: 218 MG/DL (ref 70–99)
GLUCOSE SERPL-MCNC: 187 MG/DL (ref 70–99)
GLUCOSE SERPL-MCNC: 190 MG/DL (ref 70–99)
HCT VFR BLD AUTO: 25.7 % (ref 36–48)
HGB BLD-MCNC: 8.2 G/DL (ref 12–16)
LYMPHOCYTES # BLD: 0.8 K/UL (ref 1–5.1)
LYMPHOCYTES NFR BLD: 19 %
MAGNESIUM SERPL-MCNC: 1.9 MG/DL (ref 1.8–2.4)
MAGNESIUM SERPL-MCNC: 2 MG/DL (ref 1.8–2.4)
MCH RBC QN AUTO: 26.9 PG (ref 26–34)
MCHC RBC AUTO-ENTMCNC: 32 G/DL (ref 31–36)
MCV RBC AUTO: 84.2 FL (ref 80–100)
MONOCYTES # BLD: 0.4 K/UL (ref 0–1.3)
MONOCYTES NFR BLD: 9.3 %
NEUTROPHILS # BLD: 3 K/UL (ref 1.7–7.7)
NEUTROPHILS NFR BLD: 69 %
PERFORMED ON: ABNORMAL
PHOSPHATE SERPL-MCNC: 3.8 MG/DL (ref 2.5–4.9)
PHOSPHATE SERPL-MCNC: 3.8 MG/DL (ref 2.5–4.9)
PLATELET # BLD AUTO: 298 K/UL (ref 135–450)
PMV BLD AUTO: 6.9 FL (ref 5–10.5)
POTASSIUM SERPL-SCNC: 3.8 MMOL/L (ref 3.5–5.1)
POTASSIUM SERPL-SCNC: 4.1 MMOL/L (ref 3.5–5.1)
RBC # BLD AUTO: 3.06 M/UL (ref 4–5.2)
SODIUM SERPL-SCNC: 143 MMOL/L (ref 136–145)
SODIUM SERPL-SCNC: 144 MMOL/L (ref 136–145)
WBC # BLD AUTO: 4.4 K/UL (ref 4–11)

## 2023-04-29 PROCEDURE — 2060000000 HC ICU INTERMEDIATE R&B

## 2023-04-29 PROCEDURE — 2700000000 HC OXYGEN THERAPY PER DAY

## 2023-04-29 PROCEDURE — 6370000000 HC RX 637 (ALT 250 FOR IP): Performed by: INTERNAL MEDICINE

## 2023-04-29 PROCEDURE — 6360000002 HC RX W HCPCS: Performed by: INTERNAL MEDICINE

## 2023-04-29 PROCEDURE — 2580000003 HC RX 258: Performed by: INTERNAL MEDICINE

## 2023-04-29 PROCEDURE — 6360000002 HC RX W HCPCS: Performed by: STUDENT IN AN ORGANIZED HEALTH CARE EDUCATION/TRAINING PROGRAM

## 2023-04-29 PROCEDURE — 6370000000 HC RX 637 (ALT 250 FOR IP): Performed by: NURSE PRACTITIONER

## 2023-04-29 PROCEDURE — 6370000000 HC RX 637 (ALT 250 FOR IP)

## 2023-04-29 PROCEDURE — 83735 ASSAY OF MAGNESIUM: CPT

## 2023-04-29 PROCEDURE — 85025 COMPLETE CBC W/AUTO DIFF WBC: CPT

## 2023-04-29 PROCEDURE — 94761 N-INVAS EAR/PLS OXIMETRY MLT: CPT

## 2023-04-29 PROCEDURE — 80069 RENAL FUNCTION PANEL: CPT

## 2023-04-29 RX ORDER — MAGNESIUM SULFATE IN WATER 40 MG/ML
2000 INJECTION, SOLUTION INTRAVENOUS ONCE
Status: COMPLETED | OUTPATIENT
Start: 2023-04-29 | End: 2023-04-29

## 2023-04-29 RX ORDER — INSULIN GLARGINE 100 [IU]/ML
7 INJECTION, SOLUTION SUBCUTANEOUS NIGHTLY
Status: DISCONTINUED | OUTPATIENT
Start: 2023-04-29 | End: 2023-05-03 | Stop reason: HOSPADM

## 2023-04-29 RX ORDER — ENOXAPARIN SODIUM 100 MG/ML
30 INJECTION SUBCUTANEOUS 2 TIMES DAILY
Status: DISCONTINUED | OUTPATIENT
Start: 2023-04-29 | End: 2023-05-01

## 2023-04-29 RX ADMIN — FUROSEMIDE 40 MG: 10 INJECTION, SOLUTION INTRAMUSCULAR; INTRAVENOUS at 10:26

## 2023-04-29 RX ADMIN — Medication 1 CAPSULE: at 10:26

## 2023-04-29 RX ADMIN — ASPIRIN 81 MG: 81 TABLET, COATED ORAL at 10:25

## 2023-04-29 RX ADMIN — ATORVASTATIN CALCIUM 40 MG: 40 TABLET, FILM COATED ORAL at 20:26

## 2023-04-29 RX ADMIN — METOPROLOL TARTRATE 50 MG: 50 TABLET, FILM COATED ORAL at 20:25

## 2023-04-29 RX ADMIN — FUROSEMIDE 40 MG: 10 INJECTION, SOLUTION INTRAMUSCULAR; INTRAVENOUS at 18:29

## 2023-04-29 RX ADMIN — MAGNESIUM SULFATE HEPTAHYDRATE 2000 MG: 40 INJECTION, SOLUTION INTRAVENOUS at 20:40

## 2023-04-29 RX ADMIN — INSULIN LISPRO 4 UNITS: 100 INJECTION, SOLUTION INTRAVENOUS; SUBCUTANEOUS at 11:31

## 2023-04-29 RX ADMIN — AMIODARONE HYDROCHLORIDE 200 MG: 200 TABLET ORAL at 10:25

## 2023-04-29 RX ADMIN — POTASSIUM BICARBONATE 40 MEQ: 391 TABLET, EFFERVESCENT ORAL at 10:54

## 2023-04-29 RX ADMIN — CEFTRIAXONE SODIUM 2000 MG: 2 INJECTION, POWDER, FOR SOLUTION INTRAMUSCULAR; INTRAVENOUS at 18:23

## 2023-04-29 RX ADMIN — MICONAZOLE NITRATE: 20 POWDER TOPICAL at 23:43

## 2023-04-29 RX ADMIN — MICONAZOLE NITRATE: 20 POWDER TOPICAL at 09:00

## 2023-04-29 RX ADMIN — ENOXAPARIN SODIUM 30 MG: 100 INJECTION SUBCUTANEOUS at 11:07

## 2023-04-29 RX ADMIN — ENOXAPARIN SODIUM 30 MG: 100 INJECTION SUBCUTANEOUS at 20:25

## 2023-04-29 RX ADMIN — CLOPIDOGREL BISULFATE 75 MG: 75 TABLET ORAL at 10:25

## 2023-04-29 RX ADMIN — METOPROLOL TARTRATE 50 MG: 50 TABLET, FILM COATED ORAL at 10:25

## 2023-04-29 RX ADMIN — INSULIN GLARGINE 7 UNITS: 100 INJECTION, SOLUTION SUBCUTANEOUS at 23:43

## 2023-04-29 RX ADMIN — LOSARTAN POTASSIUM 25 MG: 25 TABLET, FILM COATED ORAL at 10:25

## 2023-04-29 ASSESSMENT — PAIN SCALES - GENERAL
PAINLEVEL_OUTOF10: 0

## 2023-04-30 LAB
ALBUMIN SERPL-MCNC: 2.7 G/DL (ref 3.4–5)
ALBUMIN SERPL-MCNC: 2.8 G/DL (ref 3.4–5)
ANION GAP SERPL CALCULATED.3IONS-SCNC: 10 MMOL/L (ref 3–16)
ANION GAP SERPL CALCULATED.3IONS-SCNC: 8 MMOL/L (ref 3–16)
BASOPHILS # BLD: 0 K/UL (ref 0–0.2)
BASOPHILS NFR BLD: 0.9 %
BUN SERPL-MCNC: 12 MG/DL (ref 7–20)
BUN SERPL-MCNC: 14 MG/DL (ref 7–20)
CALCIUM SERPL-MCNC: 8.7 MG/DL (ref 8.3–10.6)
CALCIUM SERPL-MCNC: 8.8 MG/DL (ref 8.3–10.6)
CHLORIDE SERPL-SCNC: 101 MMOL/L (ref 99–110)
CHLORIDE SERPL-SCNC: 101 MMOL/L (ref 99–110)
CO2 SERPL-SCNC: 30 MMOL/L (ref 21–32)
CO2 SERPL-SCNC: 32 MMOL/L (ref 21–32)
CREAT SERPL-MCNC: <0.5 MG/DL (ref 0.6–1.2)
CREAT SERPL-MCNC: <0.5 MG/DL (ref 0.6–1.2)
DEPRECATED RDW RBC AUTO: 17.3 % (ref 12.4–15.4)
EOSINOPHIL # BLD: 0.1 K/UL (ref 0–0.6)
EOSINOPHIL NFR BLD: 1.8 %
GFR SERPLBLD CREATININE-BSD FMLA CKD-EPI: >60 ML/MIN/{1.73_M2}
GFR SERPLBLD CREATININE-BSD FMLA CKD-EPI: >60 ML/MIN/{1.73_M2}
GLUCOSE BLD-MCNC: 166 MG/DL (ref 70–99)
GLUCOSE BLD-MCNC: 183 MG/DL (ref 70–99)
GLUCOSE BLD-MCNC: 230 MG/DL (ref 70–99)
GLUCOSE BLD-MCNC: 277 MG/DL (ref 70–99)
GLUCOSE SERPL-MCNC: 177 MG/DL (ref 70–99)
GLUCOSE SERPL-MCNC: 225 MG/DL (ref 70–99)
HCT VFR BLD AUTO: 26.3 % (ref 36–48)
HGB BLD-MCNC: 8.6 G/DL (ref 12–16)
LYMPHOCYTES # BLD: 1.1 K/UL (ref 1–5.1)
LYMPHOCYTES NFR BLD: 23.3 %
MAGNESIUM SERPL-MCNC: 2 MG/DL (ref 1.8–2.4)
MAGNESIUM SERPL-MCNC: 2.2 MG/DL (ref 1.8–2.4)
MCH RBC QN AUTO: 27.4 PG (ref 26–34)
MCHC RBC AUTO-ENTMCNC: 32.5 G/DL (ref 31–36)
MCV RBC AUTO: 84.3 FL (ref 80–100)
MONOCYTES # BLD: 0.4 K/UL (ref 0–1.3)
MONOCYTES NFR BLD: 9.1 %
NEUTROPHILS # BLD: 3 K/UL (ref 1.7–7.7)
NEUTROPHILS NFR BLD: 64.9 %
PERFORMED ON: ABNORMAL
PHOSPHATE SERPL-MCNC: 3.9 MG/DL (ref 2.5–4.9)
PHOSPHATE SERPL-MCNC: 4 MG/DL (ref 2.5–4.9)
PLATELET # BLD AUTO: 280 K/UL (ref 135–450)
PMV BLD AUTO: 6.8 FL (ref 5–10.5)
POTASSIUM SERPL-SCNC: 3.8 MMOL/L (ref 3.5–5.1)
POTASSIUM SERPL-SCNC: 3.9 MMOL/L (ref 3.5–5.1)
RBC # BLD AUTO: 3.12 M/UL (ref 4–5.2)
SODIUM SERPL-SCNC: 141 MMOL/L (ref 136–145)
SODIUM SERPL-SCNC: 141 MMOL/L (ref 136–145)
WBC # BLD AUTO: 4.6 K/UL (ref 4–11)

## 2023-04-30 PROCEDURE — 6370000000 HC RX 637 (ALT 250 FOR IP)

## 2023-04-30 PROCEDURE — 83735 ASSAY OF MAGNESIUM: CPT

## 2023-04-30 PROCEDURE — 2060000000 HC ICU INTERMEDIATE R&B

## 2023-04-30 PROCEDURE — 6360000002 HC RX W HCPCS: Performed by: STUDENT IN AN ORGANIZED HEALTH CARE EDUCATION/TRAINING PROGRAM

## 2023-04-30 PROCEDURE — 6360000002 HC RX W HCPCS: Performed by: INTERNAL MEDICINE

## 2023-04-30 PROCEDURE — 6370000000 HC RX 637 (ALT 250 FOR IP): Performed by: INTERNAL MEDICINE

## 2023-04-30 PROCEDURE — 99232 SBSQ HOSP IP/OBS MODERATE 35: CPT | Performed by: NURSE PRACTITIONER

## 2023-04-30 PROCEDURE — 6370000000 HC RX 637 (ALT 250 FOR IP): Performed by: NURSE PRACTITIONER

## 2023-04-30 PROCEDURE — 2580000003 HC RX 258: Performed by: INTERNAL MEDICINE

## 2023-04-30 PROCEDURE — 80069 RENAL FUNCTION PANEL: CPT

## 2023-04-30 PROCEDURE — 85025 COMPLETE CBC W/AUTO DIFF WBC: CPT

## 2023-04-30 RX ADMIN — INSULIN GLARGINE 7 UNITS: 100 INJECTION, SOLUTION SUBCUTANEOUS at 22:16

## 2023-04-30 RX ADMIN — INSULIN LISPRO 4 UNITS: 100 INJECTION, SOLUTION INTRAVENOUS; SUBCUTANEOUS at 18:54

## 2023-04-30 RX ADMIN — FUROSEMIDE 40 MG: 10 INJECTION, SOLUTION INTRAMUSCULAR; INTRAVENOUS at 09:41

## 2023-04-30 RX ADMIN — METOPROLOL TARTRATE 50 MG: 50 TABLET, FILM COATED ORAL at 09:41

## 2023-04-30 RX ADMIN — MICONAZOLE NITRATE: 20 POWDER TOPICAL at 22:15

## 2023-04-30 RX ADMIN — ENOXAPARIN SODIUM 30 MG: 100 INJECTION SUBCUTANEOUS at 22:13

## 2023-04-30 RX ADMIN — ENOXAPARIN SODIUM 30 MG: 100 INJECTION SUBCUTANEOUS at 09:41

## 2023-04-30 RX ADMIN — ATORVASTATIN CALCIUM 40 MG: 40 TABLET, FILM COATED ORAL at 22:14

## 2023-04-30 RX ADMIN — AMIODARONE HYDROCHLORIDE 200 MG: 200 TABLET ORAL at 09:41

## 2023-04-30 RX ADMIN — SODIUM CHLORIDE, PRESERVATIVE FREE 10 ML: 5 INJECTION INTRAVENOUS at 22:15

## 2023-04-30 RX ADMIN — CLOPIDOGREL BISULFATE 75 MG: 75 TABLET ORAL at 09:41

## 2023-04-30 RX ADMIN — POTASSIUM BICARBONATE 40 MEQ: 391 TABLET, EFFERVESCENT ORAL at 12:22

## 2023-04-30 RX ADMIN — Medication 1 CAPSULE: at 09:41

## 2023-04-30 RX ADMIN — ASPIRIN 81 MG: 81 TABLET, COATED ORAL at 09:41

## 2023-04-30 RX ADMIN — CEFTRIAXONE SODIUM 2000 MG: 2 INJECTION, POWDER, FOR SOLUTION INTRAMUSCULAR; INTRAVENOUS at 17:28

## 2023-04-30 RX ADMIN — METOPROLOL TARTRATE 50 MG: 50 TABLET, FILM COATED ORAL at 22:12

## 2023-04-30 RX ADMIN — SODIUM CHLORIDE, PRESERVATIVE FREE 10 ML: 5 INJECTION INTRAVENOUS at 09:42

## 2023-04-30 RX ADMIN — FUROSEMIDE 40 MG: 10 INJECTION, SOLUTION INTRAMUSCULAR; INTRAVENOUS at 17:35

## 2023-04-30 RX ADMIN — LOSARTAN POTASSIUM 25 MG: 25 TABLET, FILM COATED ORAL at 09:41

## 2023-04-30 ASSESSMENT — PAIN SCALES - GENERAL: PAINLEVEL_OUTOF10: 0

## 2023-05-01 ENCOUNTER — ANESTHESIA (OUTPATIENT)
Dept: OPERATING ROOM | Age: 63
DRG: 622 | End: 2023-05-01
Payer: COMMERCIAL

## 2023-05-01 LAB
ALBUMIN SERPL-MCNC: 2.5 G/DL (ref 3.4–5)
ALBUMIN SERPL-MCNC: 2.5 G/DL (ref 3.4–5)
ANION GAP SERPL CALCULATED.3IONS-SCNC: 7 MMOL/L (ref 3–16)
ANION GAP SERPL CALCULATED.3IONS-SCNC: 9 MMOL/L (ref 3–16)
BASOPHILS # BLD: 0 K/UL (ref 0–0.2)
BASOPHILS NFR BLD: 0.7 %
BUN SERPL-MCNC: 11 MG/DL (ref 7–20)
BUN SERPL-MCNC: 14 MG/DL (ref 7–20)
CALCIUM SERPL-MCNC: 8.5 MG/DL (ref 8.3–10.6)
CALCIUM SERPL-MCNC: 8.7 MG/DL (ref 8.3–10.6)
CHLORIDE SERPL-SCNC: 100 MMOL/L (ref 99–110)
CHLORIDE SERPL-SCNC: 101 MMOL/L (ref 99–110)
CO2 SERPL-SCNC: 30 MMOL/L (ref 21–32)
CO2 SERPL-SCNC: 31 MMOL/L (ref 21–32)
CREAT SERPL-MCNC: <0.5 MG/DL (ref 0.6–1.2)
CREAT SERPL-MCNC: <0.5 MG/DL (ref 0.6–1.2)
DEPRECATED RDW RBC AUTO: 17 % (ref 12.4–15.4)
EOSINOPHIL # BLD: 0.1 K/UL (ref 0–0.6)
EOSINOPHIL NFR BLD: 2.3 %
GFR SERPLBLD CREATININE-BSD FMLA CKD-EPI: >60 ML/MIN/{1.73_M2}
GFR SERPLBLD CREATININE-BSD FMLA CKD-EPI: >60 ML/MIN/{1.73_M2}
GLUCOSE BLD-MCNC: 141 MG/DL (ref 70–99)
GLUCOSE BLD-MCNC: 250 MG/DL (ref 70–99)
GLUCOSE BLD-MCNC: 277 MG/DL (ref 70–99)
GLUCOSE BLD-MCNC: 277 MG/DL (ref 70–99)
GLUCOSE SERPL-MCNC: 157 MG/DL (ref 70–99)
GLUCOSE SERPL-MCNC: 271 MG/DL (ref 70–99)
HCT VFR BLD AUTO: 25.1 % (ref 36–48)
HCT VFR BLD AUTO: 26.4 % (ref 36–48)
HCT VFR BLD AUTO: 26.4 % (ref 36–48)
HGB BLD-MCNC: 8.1 G/DL (ref 12–16)
HGB BLD-MCNC: 8.5 G/DL (ref 12–16)
HGB BLD-MCNC: 8.7 G/DL (ref 12–16)
INR PPP: 1.15 (ref 0.84–1.16)
LYMPHOCYTES # BLD: 1.2 K/UL (ref 1–5.1)
LYMPHOCYTES NFR BLD: 25.9 %
MAGNESIUM SERPL-MCNC: 1.8 MG/DL (ref 1.8–2.4)
MAGNESIUM SERPL-MCNC: 1.9 MG/DL (ref 1.8–2.4)
MCH RBC QN AUTO: 27.3 PG (ref 26–34)
MCHC RBC AUTO-ENTMCNC: 32.8 G/DL (ref 31–36)
MCV RBC AUTO: 83.3 FL (ref 80–100)
MONOCYTES # BLD: 0.4 K/UL (ref 0–1.3)
MONOCYTES NFR BLD: 9.1 %
NEUTROPHILS # BLD: 2.9 K/UL (ref 1.7–7.7)
NEUTROPHILS NFR BLD: 62 %
NT-PROBNP SERPL-MCNC: 5139 PG/ML (ref 0–124)
PERFORMED ON: ABNORMAL
PHOSPHATE SERPL-MCNC: 3.8 MG/DL (ref 2.5–4.9)
PHOSPHATE SERPL-MCNC: 4 MG/DL (ref 2.5–4.9)
PLATELET # BLD AUTO: 281 K/UL (ref 135–450)
PMV BLD AUTO: 6.9 FL (ref 5–10.5)
POC ACT LR: 219 SEC
POC ACT LR: >400 SEC
POTASSIUM SERPL-SCNC: 3.7 MMOL/L (ref 3.5–5.1)
POTASSIUM SERPL-SCNC: 3.9 MMOL/L (ref 3.5–5.1)
PROTHROMBIN TIME: 14.7 SEC (ref 11.5–14.8)
RBC # BLD AUTO: 3.17 M/UL (ref 4–5.2)
SODIUM SERPL-SCNC: 138 MMOL/L (ref 136–145)
SODIUM SERPL-SCNC: 140 MMOL/L (ref 136–145)
WBC # BLD AUTO: 4.6 K/UL (ref 4–11)

## 2023-05-01 PROCEDURE — 83735 ASSAY OF MAGNESIUM: CPT

## 2023-05-01 PROCEDURE — 2580000003 HC RX 258

## 2023-05-01 PROCEDURE — 80069 RENAL FUNCTION PANEL: CPT

## 2023-05-01 PROCEDURE — 2500000003 HC RX 250 WO HCPCS: Performed by: PODIATRIST

## 2023-05-01 PROCEDURE — A4217 STERILE WATER/SALINE, 500 ML: HCPCS | Performed by: PODIATRIST

## 2023-05-01 PROCEDURE — 2580000003 HC RX 258: Performed by: STUDENT IN AN ORGANIZED HEALTH CARE EDUCATION/TRAINING PROGRAM

## 2023-05-01 PROCEDURE — 2580000003 HC RX 258: Performed by: INTERNAL MEDICINE

## 2023-05-01 PROCEDURE — 6360000002 HC RX W HCPCS: Performed by: INTERNAL MEDICINE

## 2023-05-01 PROCEDURE — 6370000000 HC RX 637 (ALT 250 FOR IP)

## 2023-05-01 PROCEDURE — 2060000000 HC ICU INTERMEDIATE R&B

## 2023-05-01 PROCEDURE — 2580000003 HC RX 258: Performed by: PODIATRIST

## 2023-05-01 PROCEDURE — 0JBR0ZZ EXCISION OF LEFT FOOT SUBCUTANEOUS TISSUE AND FASCIA, OPEN APPROACH: ICD-10-PCS | Performed by: PODIATRIST

## 2023-05-01 PROCEDURE — 7100000000 HC PACU RECOVERY - FIRST 15 MIN: Performed by: PODIATRIST

## 2023-05-01 PROCEDURE — 93005 ELECTROCARDIOGRAM TRACING: CPT | Performed by: STUDENT IN AN ORGANIZED HEALTH CARE EDUCATION/TRAINING PROGRAM

## 2023-05-01 PROCEDURE — 85025 COMPLETE CBC W/AUTO DIFF WBC: CPT

## 2023-05-01 PROCEDURE — XHRPXF7 REPLACEMENT OF SKIN WITH BIOENGINEERED ALLOGENEIC CONSTRUCT, EXTERNAL APPROACH, NEW TECHNOLOGY GROUP 7: ICD-10-PCS | Performed by: PODIATRIST

## 2023-05-01 PROCEDURE — C1762 CONN TISS, HUMAN(INC FASCIA): HCPCS | Performed by: PODIATRIST

## 2023-05-01 PROCEDURE — 85018 HEMOGLOBIN: CPT

## 2023-05-01 PROCEDURE — 7100000001 HC PACU RECOVERY - ADDTL 15 MIN: Performed by: PODIATRIST

## 2023-05-01 PROCEDURE — 3700000001 HC ADD 15 MINUTES (ANESTHESIA): Performed by: PODIATRIST

## 2023-05-01 PROCEDURE — 6360000002 HC RX W HCPCS: Performed by: PODIATRIST

## 2023-05-01 PROCEDURE — 3700000000 HC ANESTHESIA ATTENDED CARE: Performed by: PODIATRIST

## 2023-05-01 PROCEDURE — 3600000002 HC SURGERY LEVEL 2 BASE: Performed by: PODIATRIST

## 2023-05-01 PROCEDURE — 85014 HEMATOCRIT: CPT

## 2023-05-01 PROCEDURE — 6360000002 HC RX W HCPCS

## 2023-05-01 PROCEDURE — C1713 ANCHOR/SCREW BN/BN,TIS/BN: HCPCS | Performed by: PODIATRIST

## 2023-05-01 PROCEDURE — 2709999900 HC NON-CHARGEABLE SUPPLY: Performed by: PODIATRIST

## 2023-05-01 PROCEDURE — 83880 ASSAY OF NATRIURETIC PEPTIDE: CPT

## 2023-05-01 PROCEDURE — 3600000012 HC SURGERY LEVEL 2 ADDTL 15MIN: Performed by: PODIATRIST

## 2023-05-01 PROCEDURE — 6370000000 HC RX 637 (ALT 250 FOR IP): Performed by: NURSE PRACTITIONER

## 2023-05-01 PROCEDURE — 2580000003 HC RX 258: Performed by: NURSE ANESTHETIST, CERTIFIED REGISTERED

## 2023-05-01 PROCEDURE — 85610 PROTHROMBIN TIME: CPT

## 2023-05-01 PROCEDURE — 6360000002 HC RX W HCPCS: Performed by: STUDENT IN AN ORGANIZED HEALTH CARE EDUCATION/TRAINING PROGRAM

## 2023-05-01 PROCEDURE — 6360000002 HC RX W HCPCS: Performed by: NURSE ANESTHETIST, CERTIFIED REGISTERED

## 2023-05-01 PROCEDURE — 93005 ELECTROCARDIOGRAM TRACING: CPT | Performed by: INTERNAL MEDICINE

## 2023-05-01 PROCEDURE — 6360000002 HC RX W HCPCS: Performed by: ANESTHESIOLOGY

## 2023-05-01 PROCEDURE — 99232 SBSQ HOSP IP/OBS MODERATE 35: CPT | Performed by: INTERNAL MEDICINE

## 2023-05-01 DEVICE — GRAFT HUM TISS 2X4CM 0.4-1MM THN REGENERATIVE TISS MTRX: Type: IMPLANTABLE DEVICE | Site: FOOT | Status: FUNCTIONAL

## 2023-05-01 RX ORDER — SODIUM CHLORIDE 9 MG/ML
INJECTION, SOLUTION INTRAVENOUS CONTINUOUS
Status: ACTIVE | OUTPATIENT
Start: 2023-05-01 | End: 2023-05-01

## 2023-05-01 RX ORDER — DIGOXIN 0.25 MG/ML
125 INJECTION INTRAMUSCULAR; INTRAVENOUS ONCE
Status: COMPLETED | OUTPATIENT
Start: 2023-05-01 | End: 2023-05-01

## 2023-05-01 RX ORDER — MAGNESIUM HYDROXIDE 1200 MG/15ML
LIQUID ORAL CONTINUOUS PRN
Status: DISCONTINUED | OUTPATIENT
Start: 2023-05-01 | End: 2023-05-01 | Stop reason: HOSPADM

## 2023-05-01 RX ORDER — LIDOCAINE HYDROCHLORIDE 10 MG/ML
INJECTION, SOLUTION EPIDURAL; INFILTRATION; INTRACAUDAL; PERINEURAL PRN
Status: DISCONTINUED | OUTPATIENT
Start: 2023-05-01 | End: 2023-05-01 | Stop reason: HOSPADM

## 2023-05-01 RX ORDER — ENOXAPARIN SODIUM 100 MG/ML
30 INJECTION SUBCUTANEOUS 2 TIMES DAILY
Status: DISCONTINUED | OUTPATIENT
Start: 2023-05-01 | End: 2023-05-03 | Stop reason: HOSPADM

## 2023-05-01 RX ORDER — MEPERIDINE HYDROCHLORIDE 25 MG/ML
12.5 INJECTION INTRAMUSCULAR; INTRAVENOUS; SUBCUTANEOUS EVERY 5 MIN PRN
Status: DISCONTINUED | OUTPATIENT
Start: 2023-05-01 | End: 2023-05-01 | Stop reason: HOSPADM

## 2023-05-01 RX ORDER — PROPOFOL 10 MG/ML
INJECTION, EMULSION INTRAVENOUS PRN
Status: DISCONTINUED | OUTPATIENT
Start: 2023-05-01 | End: 2023-05-01 | Stop reason: SDUPTHER

## 2023-05-01 RX ORDER — METOCLOPRAMIDE HYDROCHLORIDE 5 MG/ML
10 INJECTION INTRAMUSCULAR; INTRAVENOUS
Status: COMPLETED | OUTPATIENT
Start: 2023-05-01 | End: 2023-05-01

## 2023-05-01 RX ORDER — MIDAZOLAM HYDROCHLORIDE 1 MG/ML
INJECTION INTRAMUSCULAR; INTRAVENOUS PRN
Status: DISCONTINUED | OUTPATIENT
Start: 2023-05-01 | End: 2023-05-01 | Stop reason: SDUPTHER

## 2023-05-01 RX ORDER — SODIUM CHLORIDE 0.9 % (FLUSH) 0.9 %
5-40 SYRINGE (ML) INJECTION EVERY 12 HOURS SCHEDULED
Status: DISCONTINUED | OUTPATIENT
Start: 2023-05-01 | End: 2023-05-01 | Stop reason: HOSPADM

## 2023-05-01 RX ORDER — PROPOFOL 10 MG/ML
INJECTION, EMULSION INTRAVENOUS CONTINUOUS PRN
Status: DISCONTINUED | OUTPATIENT
Start: 2023-05-01 | End: 2023-05-01 | Stop reason: SDUPTHER

## 2023-05-01 RX ORDER — 0.9 % SODIUM CHLORIDE 0.9 %
250 INTRAVENOUS SOLUTION INTRAVENOUS ONCE
Status: COMPLETED | OUTPATIENT
Start: 2023-05-01 | End: 2023-05-01

## 2023-05-01 RX ORDER — DIPHENHYDRAMINE HYDROCHLORIDE 50 MG/ML
12.5 INJECTION INTRAMUSCULAR; INTRAVENOUS
Status: DISCONTINUED | OUTPATIENT
Start: 2023-05-01 | End: 2023-05-01 | Stop reason: HOSPADM

## 2023-05-01 RX ORDER — SODIUM CHLORIDE, SODIUM LACTATE, POTASSIUM CHLORIDE, CALCIUM CHLORIDE 600; 310; 30; 20 MG/100ML; MG/100ML; MG/100ML; MG/100ML
INJECTION, SOLUTION INTRAVENOUS CONTINUOUS PRN
Status: DISCONTINUED | OUTPATIENT
Start: 2023-05-01 | End: 2023-05-01 | Stop reason: SDUPTHER

## 2023-05-01 RX ORDER — SODIUM CHLORIDE 9 MG/ML
25 INJECTION, SOLUTION INTRAVENOUS PRN
Status: DISCONTINUED | OUTPATIENT
Start: 2023-05-01 | End: 2023-05-01 | Stop reason: HOSPADM

## 2023-05-01 RX ORDER — HYDRALAZINE HYDROCHLORIDE 20 MG/ML
10 INJECTION INTRAMUSCULAR; INTRAVENOUS
Status: DISCONTINUED | OUTPATIENT
Start: 2023-05-01 | End: 2023-05-01 | Stop reason: HOSPADM

## 2023-05-01 RX ORDER — SODIUM CHLORIDE 0.9 % (FLUSH) 0.9 %
5-40 SYRINGE (ML) INJECTION PRN
Status: DISCONTINUED | OUTPATIENT
Start: 2023-05-01 | End: 2023-05-01 | Stop reason: HOSPADM

## 2023-05-01 RX ORDER — MAGNESIUM SULFATE IN WATER 40 MG/ML
2000 INJECTION, SOLUTION INTRAVENOUS ONCE
Status: COMPLETED | OUTPATIENT
Start: 2023-05-01 | End: 2023-05-01

## 2023-05-01 RX ORDER — LABETALOL HYDROCHLORIDE 5 MG/ML
10 INJECTION, SOLUTION INTRAVENOUS
Status: DISCONTINUED | OUTPATIENT
Start: 2023-05-01 | End: 2023-05-01 | Stop reason: HOSPADM

## 2023-05-01 RX ADMIN — SODIUM CHLORIDE 250 ML: 9 INJECTION, SOLUTION INTRAVENOUS at 14:26

## 2023-05-01 RX ADMIN — CEFTRIAXONE SODIUM 2000 MG: 2 INJECTION, POWDER, FOR SOLUTION INTRAMUSCULAR; INTRAVENOUS at 16:17

## 2023-05-01 RX ADMIN — PROPOFOL 30 MG: 10 INJECTION, EMULSION INTRAVENOUS at 09:16

## 2023-05-01 RX ADMIN — MICONAZOLE NITRATE: 20 POWDER TOPICAL at 08:07

## 2023-05-01 RX ADMIN — Medication 100 MG: at 09:16

## 2023-05-01 RX ADMIN — MAGNESIUM SULFATE HEPTAHYDRATE 2000 MG: 40 INJECTION, SOLUTION INTRAVENOUS at 20:15

## 2023-05-01 RX ADMIN — MIDAZOLAM HYDROCHLORIDE 2 MG: 2 INJECTION, SOLUTION INTRAMUSCULAR; INTRAVENOUS at 09:12

## 2023-05-01 RX ADMIN — SODIUM CHLORIDE, SODIUM LACTATE, POTASSIUM CHLORIDE, AND CALCIUM CHLORIDE: .6; .31; .03; .02 INJECTION, SOLUTION INTRAVENOUS at 09:12

## 2023-05-01 RX ADMIN — MICONAZOLE NITRATE: 20 POWDER TOPICAL at 22:01

## 2023-05-01 RX ADMIN — SODIUM CHLORIDE: 9 INJECTION, SOLUTION INTRAVENOUS at 21:08

## 2023-05-01 RX ADMIN — ATORVASTATIN CALCIUM 40 MG: 40 TABLET, FILM COATED ORAL at 22:02

## 2023-05-01 RX ADMIN — Medication 1 CAPSULE: at 13:27

## 2023-05-01 RX ADMIN — POTASSIUM BICARBONATE 40 MEQ: 391 TABLET, EFFERVESCENT ORAL at 22:03

## 2023-05-01 RX ADMIN — ENOXAPARIN SODIUM 30 MG: 100 INJECTION SUBCUTANEOUS at 22:02

## 2023-05-01 RX ADMIN — SODIUM CHLORIDE, PRESERVATIVE FREE 10 ML: 5 INJECTION INTRAVENOUS at 13:28

## 2023-05-01 RX ADMIN — POTASSIUM BICARBONATE 40 MEQ: 391 TABLET, EFFERVESCENT ORAL at 13:27

## 2023-05-01 RX ADMIN — AMIODARONE HYDROCHLORIDE 150 MG: 50 INJECTION, SOLUTION INTRAVENOUS at 19:22

## 2023-05-01 RX ADMIN — PROPOFOL 50 MCG/KG/MIN: 10 INJECTION, EMULSION INTRAVENOUS at 09:16

## 2023-05-01 RX ADMIN — INSULIN GLARGINE 7 UNITS: 100 INJECTION, SOLUTION SUBCUTANEOUS at 22:01

## 2023-05-01 RX ADMIN — CLOPIDOGREL BISULFATE 75 MG: 75 TABLET ORAL at 13:27

## 2023-05-01 RX ADMIN — METOCLOPRAMIDE HYDROCHLORIDE 10 MG: 5 INJECTION INTRAMUSCULAR; INTRAVENOUS at 11:42

## 2023-05-01 RX ADMIN — SODIUM CHLORIDE, PRESERVATIVE FREE 10 ML: 5 INJECTION INTRAVENOUS at 20:07

## 2023-05-01 RX ADMIN — DIGOXIN 125 MCG: 0.25 INJECTION INTRAMUSCULAR; INTRAVENOUS at 19:37

## 2023-05-01 RX ADMIN — ASPIRIN 81 MG: 81 TABLET, COATED ORAL at 13:27

## 2023-05-01 ASSESSMENT — PAIN SCALES - GENERAL
PAINLEVEL_OUTOF10: 0

## 2023-05-01 ASSESSMENT — PAIN SCALES - WONG BAKER: WONGBAKER_NUMERICALRESPONSE: 0

## 2023-05-01 NOTE — BRIEF OP NOTE
Brief Postoperative Note      Patient: Tarah Melgar  YOB: 1960  MRN: [id number]    Date of Procedure: 5/1/2023    Pre-Op Diagnosis Codes:     * Diabetic infection of left foot ([address] [E11.628, L08.9]    Post-Op Diagnosis: Same       Procedure(s):  LEFT FOOT INCISION AND DEBRIDEMENT/ POSSIBLE GRAFT APPLICATION/ POSSIBLE WOUND VACUUM APPLICATION  . Surgeon(s):  Cassia Montalvo DPM    Assistant:  Resident: Chaparrita Herman DPM    Anesthesia: Monitor Anesthesia Care    Materials: 3-0 Nylon    Injectables:   Pre-op: 20 cc of 1% Lidocaine plain  Post-op: 30 cc of 0.5% marcaine plain    Hemostasis: Anatomic dissection, thrombin soaked gel foam    Estimated Blood Loss (mL): less than 93BY    Complications: None    Specimens:   * No specimens in log *    Implants:  Implant Name Type Inv. Item Serial No.  Lot No. LRB No. Used Action   GRAFT HUM TISS 2X4CM 0.4-1MM [facility] REGENERATIVE TISS MTRX - [id number]  GRAFT HUM TISS 2X4CM 0.4-1MM [facility] REGENERATIVE TISS MTRX [id number] [facility] [place] [employer] [facility]- [id number] Left 1 Implanted   [id number] - [id number]   [id number]  [id number] Left 1 Implanted         Drains:   External Urinary Catheter (Active)   Site Assessment Clean,dry & intact 05/01/23 0630   Placement Repositioned 04/30/23 1714   Securement Method Other (Comment) 05/01/23 0630   Catheter Care Suction Canister/Tubing changed 05/01/23 0145   Perineal Care No 04/30/23 2353   Suction 40 mmgHg continuous 05/01/23 0630   Urine Color Yellow 05/01/23 0630   Urine Appearance Clear 05/01/23 0630   Urine Odor Other (Comment) 05/01/23 0630   Output (mL) 200 mL 05/01/23 0410       Findings:  No purulence encountered during the procedure. The wound was exposed to the level of the subtalar joint at the time of surgery. Necrotic tissue margins and fibrotic wound bases were debrided down to healthy granular tissue.      DISPO: S/P incision and drainage with graft application and wound vac application left

## 2023-05-01 NOTE — PLAN OF CARE
Problem: Skin/Tissue Integrity  Goal: Absence of new skin breakdown  Description: 1. Monitor for areas of redness and/or skin breakdown  2. Assess vascular access sites hourly  3. Every 4-6 hours minimum:  Change oxygen saturation probe site  4. Every 4-6 hours:  If on nasal continuous positive airway pressure, respiratory therapy assess nares and determine need for appliance change or resting period. Outcome: Progressing     Problem: Safety - Adult  Goal: Free from fall injury  Outcome: Progressing     Problem: Self Harm/Suicidality  Goal: Will have no self-injury during hospital stay  Description: INTERVENTIONS:  1. Ensure constant observer at bedside with Q15M safety checks  2. Maintain a safe environment  3. Secure patient belongings  4. Ensure family/visitors adhere to safety recommendations  5. Ensure safety tray has been added to patient's diet order  6.   Every shift and PRN: Re-assess suicidal risk via Frequent Screener    Outcome: Progressing     Problem: Pain  Goal: Verbalizes/displays adequate comfort level or baseline comfort level  Outcome: Progressing     Problem: Chronic Conditions and Co-morbidities  Goal: Patient's chronic conditions and co-morbidity symptoms are monitored and maintained or improved  Outcome: Progressing     Problem: Nutrition Deficit:  Goal: Optimize nutritional status  Outcome: Progressing     Problem: ABCDS Injury Assessment  Goal: Absence of physical injury  Outcome: Progressing     Problem: Cardiovascular - Adult  Goal: Maintains optimal cardiac output and hemodynamic stability  Outcome: Progressing

## 2023-05-01 NOTE — PLAN OF CARE
Problem: Skin/Tissue Integrity  Goal: Absence of new skin breakdown  Description: 1. Monitor for areas of redness and/or skin breakdown  2. Assess vascular access sites hourly  3. Every 4-6 hours minimum:  Change oxygen saturation probe site  4. Every 4-6 hours:  If on nasal continuous positive airway pressure, respiratory therapy assess nares and determine need for appliance change or resting period. 5/1/2023 0648 by Bobby Dunn RN  Outcome: Progressing  Note: Patient on a specialty mattress but is refusing Q2 turns and changes. Problem: Safety - Adult  Goal: Free from fall injury  5/1/2023 0648 by Bobby Dunn RN  Outcome: Progressing  Flowsheets (Taken 5/1/2023 1732)  Free From Fall Injury: Instruct family/caregiver on patient safety  Note: Pt is a Fall Risk. See Sandoval Margarito Fall Risk Score. Pt bed in low position and side rails up. Call light and belongings in reach. Pt encouraged to call for assistance. Will continue with hourly rounds for PO intake, pain needs, toileting, and repositioning as needed. Problem: Discharge Planning  Goal: Discharge to home or other facility with appropriate resources  Outcome: Progressing  Flowsheets (Taken 5/1/2023 1470)  Discharge to home or other facility with appropriate resources: Identify barriers to discharge with patient and caregiver  Note: Patient is going for an I and D of her left foot 5/1/2023.

## 2023-05-01 NOTE — ANESTHESIA POSTPROCEDURE EVALUATION
Department of Anesthesiology  Postprocedure Note    Patient: Kemar Covarrubias  MRN: [id number]  YOB: 1960  Date of evaluation: 5/1/2023      Procedure Summary     Date: 05/01/23 Room / Location: [facility]    Anesthesia Start: 9247 Anesthesia Stop: 1693    Procedures:       LEFT FOOT INCISION AND DEBRIDEMENT/ POSSIBLE GRAFT APPLICATION/ POSSIBLE WOUND VACUUM APPLICATION (Left)      . (Left) Diagnosis:       Diabetic infection of left foot ([address]      (Diabetic infection of left foot ([address] [Y96.665, L08.9])    Surgeons: Edu Pradhan DPM Responsible Provider: Pina Blunt MD    Anesthesia Type: general, MAC ASA Status: 3          Anesthesia Type: No value filed.     Fabi Phase I: Fabi Score: 9    Fabi Phase II:        Anesthesia Post Evaluation    Patient location during evaluation: PACU  Patient participation: complete - patient participated  Level of consciousness: awake and alert  Pain score: 0  Airway patency: patent  Nausea & Vomiting: no nausea and no vomiting  Complications: no  Cardiovascular status: hemodynamically stable  Respiratory status: acceptable  Hydration status: euvolemic

## 2023-05-01 NOTE — CARE COORDINATION
Cm met with pt and spouse. They would like to look at other facilities. They would like referrals to [facility], [facility] and [facility]. CM LM for Mandy Patricio at the [facility]. CM spoke with Ryland at [facility]. They do not have a contract with [employer]. CM called Mi at [facility]. They are not in network    CM spoke with Lima at [facility]. They are in network and can accpet. CM spoke with [address] They can accept. They need updated PT/OT for pursue precert. She will need to pull her precert if pt is electing to go to another facility. 4:35 PM  Cm rec'd call from Nida Akbar at the [facility]. They can accept. Pt will need rapid covid w/in 48hrs of dc. CM left message for Mandy Neves to start precert tomorrow after new PT/OT is in. Cm spoke with pt at bedside. She was sleepy. Cm spoke with pt's . He is aware the [facility] can accept.

## 2023-05-02 LAB
ALBUMIN SERPL-MCNC: 2.5 G/DL (ref 3.4–5)
ALBUMIN SERPL-MCNC: 2.7 G/DL (ref 3.4–5)
ANION GAP SERPL CALCULATED.3IONS-SCNC: 6 MMOL/L (ref 3–16)
ANION GAP SERPL CALCULATED.3IONS-SCNC: 7 MMOL/L (ref 3–16)
ANISOCYTOSIS BLD QL SMEAR: ABNORMAL
BASOPHILS # BLD: 0 K/UL (ref 0–0.2)
BASOPHILS NFR BLD: 0.9 %
BUN SERPL-MCNC: 11 MG/DL (ref 7–20)
BUN SERPL-MCNC: 13 MG/DL (ref 7–20)
CALCIUM SERPL-MCNC: 8.3 MG/DL (ref 8.3–10.6)
CALCIUM SERPL-MCNC: 8.4 MG/DL (ref 8.3–10.6)
CHLORIDE SERPL-SCNC: 100 MMOL/L (ref 99–110)
CHLORIDE SERPL-SCNC: 101 MMOL/L (ref 99–110)
CO2 SERPL-SCNC: 30 MMOL/L (ref 21–32)
CO2 SERPL-SCNC: 31 MMOL/L (ref 21–32)
CREAT SERPL-MCNC: 0.6 MG/DL (ref 0.6–1.2)
CREAT SERPL-MCNC: <0.5 MG/DL (ref 0.6–1.2)
DEPRECATED RDW RBC AUTO: 17.1 % (ref 12.4–15.4)
EKG ATRIAL RATE: 153 BPM
EKG ATRIAL RATE: 170 BPM
EKG ATRIAL RATE: 187 BPM
EKG ATRIAL RATE: 90 BPM
EKG DIAGNOSIS: NORMAL
EKG P AXIS: 8 DEGREES
EKG P-R INTERVAL: 148 MS
EKG Q-T INTERVAL: 296 MS
EKG Q-T INTERVAL: 296 MS
EKG Q-T INTERVAL: 300 MS
EKG Q-T INTERVAL: 356 MS
EKG QRS DURATION: 132 MS
EKG QRS DURATION: 86 MS
EKG QRS DURATION: 90 MS
EKG QRS DURATION: 90 MS
EKG QTC CALCULATION (BAZETT): 435 MS
EKG QTC CALCULATION (BAZETT): 484 MS
EKG QTC CALCULATION (BAZETT): 493 MS
EKG QTC CALCULATION (BAZETT): 501 MS
EKG R AXIS: -1 DEGREES
EKG R AXIS: -34 DEGREES
EKG R AXIS: 11 DEGREES
EKG R AXIS: 7 DEGREES
EKG T AXIS: 120 DEGREES
EKG T AXIS: 152 DEGREES
EKG T AXIS: 158 DEGREES
EKG T AXIS: 173 DEGREES
EKG VENTRICULAR RATE: 161 BPM
EKG VENTRICULAR RATE: 167 BPM
EKG VENTRICULAR RATE: 168 BPM
EKG VENTRICULAR RATE: 90 BPM
EOSINOPHIL # BLD: 0.1 K/UL (ref 0–0.6)
EOSINOPHIL NFR BLD: 2.7 %
GFR SERPLBLD CREATININE-BSD FMLA CKD-EPI: >60 ML/MIN/{1.73_M2}
GFR SERPLBLD CREATININE-BSD FMLA CKD-EPI: >60 ML/MIN/{1.73_M2}
GLUCOSE BLD-MCNC: 149 MG/DL (ref 70–99)
GLUCOSE BLD-MCNC: 173 MG/DL (ref 70–99)
GLUCOSE BLD-MCNC: 238 MG/DL (ref 70–99)
GLUCOSE BLD-MCNC: 240 MG/DL (ref 70–99)
GLUCOSE SERPL-MCNC: 139 MG/DL (ref 70–99)
GLUCOSE SERPL-MCNC: 228 MG/DL (ref 70–99)
HCT VFR BLD AUTO: 24 % (ref 36–48)
HGB BLD-MCNC: 7.8 G/DL (ref 12–16)
LYMPHOCYTES # BLD: 1.1 K/UL (ref 1–5.1)
LYMPHOCYTES NFR BLD: 21.7 %
MAGNESIUM SERPL-MCNC: 1.9 MG/DL (ref 1.8–2.4)
MCH RBC QN AUTO: 27.1 PG (ref 26–34)
MCHC RBC AUTO-ENTMCNC: 32.5 G/DL (ref 31–36)
MCV RBC AUTO: 83.6 FL (ref 80–100)
MICROCYTES BLD QL SMEAR: ABNORMAL
MONOCYTES # BLD: 0.5 K/UL (ref 0–1.3)
MONOCYTES NFR BLD: 9.4 %
NEUTROPHILS # BLD: 3.3 K/UL (ref 1.7–7.7)
NEUTROPHILS NFR BLD: 65.3 %
PERFORMED ON: ABNORMAL
PHOSPHATE SERPL-MCNC: 3.5 MG/DL (ref 2.5–4.9)
PHOSPHATE SERPL-MCNC: 3.9 MG/DL (ref 2.5–4.9)
PLATELET # BLD AUTO: 238 K/UL (ref 135–450)
PLATELET BLD QL SMEAR: ADEQUATE
PMV BLD AUTO: 7.3 FL (ref 5–10.5)
POLYCHROMASIA BLD QL SMEAR: ABNORMAL
POTASSIUM SERPL-SCNC: 3.6 MMOL/L (ref 3.5–5.1)
POTASSIUM SERPL-SCNC: 4 MMOL/L (ref 3.5–5.1)
RBC # BLD AUTO: 2.87 M/UL (ref 4–5.2)
SODIUM SERPL-SCNC: 137 MMOL/L (ref 136–145)
SODIUM SERPL-SCNC: 138 MMOL/L (ref 136–145)
WBC # BLD AUTO: 5.1 K/UL (ref 4–11)

## 2023-05-02 PROCEDURE — 2060000000 HC ICU INTERMEDIATE R&B

## 2023-05-02 PROCEDURE — 97535 SELF CARE MNGMENT TRAINING: CPT

## 2023-05-02 PROCEDURE — 6370000000 HC RX 637 (ALT 250 FOR IP): Performed by: NURSE PRACTITIONER

## 2023-05-02 PROCEDURE — 97530 THERAPEUTIC ACTIVITIES: CPT

## 2023-05-02 PROCEDURE — 83735 ASSAY OF MAGNESIUM: CPT

## 2023-05-02 PROCEDURE — 6360000002 HC RX W HCPCS

## 2023-05-02 PROCEDURE — 6370000000 HC RX 637 (ALT 250 FOR IP): Performed by: STUDENT IN AN ORGANIZED HEALTH CARE EDUCATION/TRAINING PROGRAM

## 2023-05-02 PROCEDURE — 80069 RENAL FUNCTION PANEL: CPT

## 2023-05-02 PROCEDURE — 6370000000 HC RX 637 (ALT 250 FOR IP)

## 2023-05-02 PROCEDURE — 6370000000 HC RX 637 (ALT 250 FOR IP): Performed by: INTERNAL MEDICINE

## 2023-05-02 PROCEDURE — 99232 SBSQ HOSP IP/OBS MODERATE 35: CPT | Performed by: INTERNAL MEDICINE

## 2023-05-02 PROCEDURE — 2580000003 HC RX 258: Performed by: INTERNAL MEDICINE

## 2023-05-02 PROCEDURE — 6360000002 HC RX W HCPCS: Performed by: INTERNAL MEDICINE

## 2023-05-02 PROCEDURE — 85025 COMPLETE CBC W/AUTO DIFF WBC: CPT

## 2023-05-02 PROCEDURE — 93005 ELECTROCARDIOGRAM TRACING: CPT

## 2023-05-02 PROCEDURE — 97110 THERAPEUTIC EXERCISES: CPT

## 2023-05-02 PROCEDURE — 99232 SBSQ HOSP IP/OBS MODERATE 35: CPT | Performed by: NURSE PRACTITIONER

## 2023-05-02 RX ORDER — FUROSEMIDE 40 MG/1
40 TABLET ORAL DAILY
Qty: 60 TABLET | Refills: 3 | Status: SHIPPED | OUTPATIENT
Start: 2023-05-03

## 2023-05-02 RX ORDER — FUROSEMIDE 40 MG/1
40 TABLET ORAL DAILY
Status: DISCONTINUED | OUTPATIENT
Start: 2023-05-02 | End: 2023-05-03 | Stop reason: HOSPADM

## 2023-05-02 RX ORDER — ASPIRIN 81 MG/1
81 TABLET ORAL DAILY
Qty: 30 TABLET | Refills: 3 | Status: SHIPPED | OUTPATIENT
Start: 2023-05-03

## 2023-05-02 RX ORDER — MAGNESIUM SULFATE 1 G/100ML
1000 INJECTION INTRAVENOUS ONCE
Status: COMPLETED | OUTPATIENT
Start: 2023-05-02 | End: 2023-05-02

## 2023-05-02 RX ORDER — AMIODARONE HYDROCHLORIDE 200 MG/1
200 TABLET ORAL DAILY
Qty: 30 TABLET | Refills: 2 | Status: SHIPPED | OUTPATIENT
Start: 2023-05-03 | End: 2023-06-02

## 2023-05-02 RX ORDER — POTASSIUM CHLORIDE 20 MEQ/1
40 TABLET, EXTENDED RELEASE ORAL ONCE
Status: DISCONTINUED | OUTPATIENT
Start: 2023-05-02 | End: 2023-05-03 | Stop reason: HOSPADM

## 2023-05-02 RX ORDER — ATORVASTATIN CALCIUM 40 MG/1
40 TABLET, FILM COATED ORAL NIGHTLY
Qty: 30 TABLET | Refills: 3 | Status: SHIPPED | OUTPATIENT
Start: 2023-05-02

## 2023-05-02 RX ORDER — LOSARTAN POTASSIUM 25 MG/1
25 TABLET ORAL DAILY
Qty: 30 TABLET | Refills: 3 | Status: SHIPPED | OUTPATIENT
Start: 2023-05-03

## 2023-05-02 RX ADMIN — AMIODARONE HYDROCHLORIDE 200 MG: 200 TABLET ORAL at 08:06

## 2023-05-02 RX ADMIN — CEFTRIAXONE SODIUM 2000 MG: 2 INJECTION, POWDER, FOR SOLUTION INTRAMUSCULAR; INTRAVENOUS at 15:26

## 2023-05-02 RX ADMIN — MICONAZOLE NITRATE: 20 POWDER TOPICAL at 20:53

## 2023-05-02 RX ADMIN — ASPIRIN 81 MG: 81 TABLET, COATED ORAL at 08:07

## 2023-05-02 RX ADMIN — SODIUM CHLORIDE, PRESERVATIVE FREE 10 ML: 5 INJECTION INTRAVENOUS at 08:06

## 2023-05-02 RX ADMIN — ATORVASTATIN CALCIUM 40 MG: 40 TABLET, FILM COATED ORAL at 20:52

## 2023-05-02 RX ADMIN — METOPROLOL TARTRATE 50 MG: 50 TABLET, FILM COATED ORAL at 08:06

## 2023-05-02 RX ADMIN — ENOXAPARIN SODIUM 30 MG: 100 INJECTION SUBCUTANEOUS at 08:06

## 2023-05-02 RX ADMIN — SODIUM CHLORIDE 25 ML: 9 INJECTION, SOLUTION INTRAVENOUS at 15:23

## 2023-05-02 RX ADMIN — CLOPIDOGREL BISULFATE 75 MG: 75 TABLET ORAL at 08:07

## 2023-05-02 RX ADMIN — ENOXAPARIN SODIUM 30 MG: 100 INJECTION SUBCUTANEOUS at 20:52

## 2023-05-02 RX ADMIN — LOSARTAN POTASSIUM 25 MG: 25 TABLET, FILM COATED ORAL at 08:07

## 2023-05-02 RX ADMIN — MAGNESIUM SULFATE HEPTAHYDRATE 1000 MG: 1 INJECTION, SOLUTION INTRAVENOUS at 10:29

## 2023-05-02 RX ADMIN — Medication 1 CAPSULE: at 08:07

## 2023-05-02 RX ADMIN — MICONAZOLE NITRATE: 20 POWDER TOPICAL at 08:07

## 2023-05-02 RX ADMIN — INSULIN LISPRO 4 UNITS: 100 INJECTION, SOLUTION INTRAVENOUS; SUBCUTANEOUS at 17:51

## 2023-05-02 RX ADMIN — POTASSIUM BICARBONATE 40 MEQ: 391 TABLET, EFFERVESCENT ORAL at 20:52

## 2023-05-02 RX ADMIN — METOPROLOL TARTRATE 50 MG: 50 TABLET, FILM COATED ORAL at 20:52

## 2023-05-02 RX ADMIN — INSULIN GLARGINE 7 UNITS: 100 INJECTION, SOLUTION SUBCUTANEOUS at 20:49

## 2023-05-02 RX ADMIN — POTASSIUM BICARBONATE 40 MEQ: 391 TABLET, EFFERVESCENT ORAL at 08:07

## 2023-05-02 RX ADMIN — SODIUM CHLORIDE 25 ML: 9 INJECTION, SOLUTION INTRAVENOUS at 10:27

## 2023-05-02 RX ADMIN — SODIUM CHLORIDE, PRESERVATIVE FREE 10 ML: 5 INJECTION INTRAVENOUS at 20:54

## 2023-05-02 RX ADMIN — FUROSEMIDE 40 MG: 40 TABLET ORAL at 12:05

## 2023-05-02 ASSESSMENT — PAIN SCALES - GENERAL
PAINLEVEL_OUTOF10: 0

## 2023-05-02 ASSESSMENT — PAIN SCALES - WONG BAKER
WONGBAKER_NUMERICALRESPONSE: 0

## 2023-05-02 NOTE — PLAN OF CARE
Problem: Skin/Tissue Integrity  Goal: Absence of new skin breakdown  Description: 1. Monitor for areas of redness and/or skin breakdown  2. Assess vascular access sites hourly  3. Every 4-6 hours minimum:  Change oxygen saturation probe site  4. Every 4-6 hours:  If on nasal continuous positive airway pressure, respiratory therapy assess nares and determine need for appliance change or resting period. Outcome: Progressing  Note: Patient has a pressure reduction bed. Patient refused turns and repositioning. She has no new skin break down. Problem: Safety - Adult  Goal: Free from fall injury  Outcome: Progressing  Flowsheets (Taken 5/2/2023 0456)  Free From Fall Injury: Instruct family/caregiver on patient safety  Note: Pt is a Fall Risk. See Prachi Bo Fall Risk Score. Pt bed in low position and side rails up. Call light and belongings in reach. Pt encouraged to call for assistance. Will continue with hourly rounds for PO intake, pain needs, and repositioning as needed. Problem: Discharge Planning  Goal: Discharge to home or other facility with appropriate resources  Outcome: Progressing  Flowsheets (Taken 5/2/2023 0456)  Discharge to home or other facility with appropriate resources:   Identify barriers to discharge with patient and caregiver   Arrange for needed discharge resources and transportation as appropriate  Note: Patient needs approval to a SNF.

## 2023-05-02 NOTE — CARE COORDINATION
CM spoke with PT/OT requesting updates notes so that precert can be started for new facility. MARJAN spoke with , Marc Nayak, confirms they want to discharge to [facility]. CM touched base with Krystal Pinto at [place], will start precert once new therapy notes are in.     Thank you  Cat Alexander Lau RN, BSN, [address]   [phone]

## 2023-05-02 NOTE — PLAN OF CARE
Problem: Safety - Adult  Goal: Free from fall injury  5/2/2023 1722 by Karie Patten RN  Outcome: Progressing  Flowsheets (Taken 5/2/2023 1722)  Free From Fall Injury:   Instruct family/caregiver on patient safety   Based on caregiver fall risk screen, instruct family/caregiver to ask for assistance with transferring infant if caregiver noted to have fall risk factors     Problem: Discharge Planning  Goal: Discharge to home or other facility with appropriate resources  5/2/2023 1722 by Karie Patten RN  Outcome: Progressing  Flowsheets (Taken 5/2/2023 1722)  Discharge to home or other facility with appropriate resources:   Identify barriers to discharge with patient and caregiver   Arrange for needed discharge resources and transportation as appropriate   Identify discharge learning needs (meds, wound care, etc)     Problem: Chronic Conditions and Co-morbidities  Goal: Patient's chronic conditions and co-morbidity symptoms are monitored and maintained or improved  Outcome: Progressing  Flowsheets (Taken 5/2/2023 1722)  Care Plan - Patient's Chronic Conditions and Co-Morbidity Symptoms are Monitored and Maintained or Improved:   Monitor and assess patient's chronic conditions and comorbid symptoms for stability, deterioration, or improvement   Collaborate with multidisciplinary team to address chronic and comorbid conditions and prevent exacerbation or deterioration   Update acute care plan with appropriate goals if chronic or comorbid symptoms are exacerbated and prevent overall improvement and discharge     Problem: Nutrition Deficit:  Goal: Optimize nutritional status  Outcome: Progressing  Flowsheets (Taken 5/2/2023 1722)  Nutrient intake appropriate for improving, restoring, or maintaining nutritional needs:   Assess nutritional status and recommend course of action   Monitor oral intake, labs, and treatment plans   Recommend appropriate diets, oral nutritional supplements, and vitamin/mineral supplements

## 2023-05-02 NOTE — OP NOTE
[address], [address]                                OPERATIVE REPORT    PATIENT NAME: Yina Mathews                     :        1960  MED REC NO:   [id number]                          ROOM:       4458  ACCOUNT NO:   [de-identified]                           ADMIT DATE: 2023  PROVIDER:     Amira Kc DPM    DATE OF PROCEDURE:  2023    PREOPERATIVE DIAGNOSES:  1. Valiente grade 4 ulceration, left foot x2.  2.  Diabetes mellitus with peripheral neuropathy and peripheral vascular  disease. POSTOPERATIVE DIAGNOSES:  1. Valiente grade 4 ulceration, left foot x2.  2.  Diabetes mellitus with peripheral neuropathy and peripheral vascular  disease. 3.  Osteomyelitis, likely of the talus. OPERATIONS PERFORMED:  Incision and drainage down to bone cortex, left  foot ulceration with application of bioengineered skin substitute x2. SURGEON:  Amira Kc DPM    ASSISTANT:  Zohra Feng DPM, PGY1    ANESTHESIA:  MAC with a local block consisting of total of 20 mL of 2%  lidocaine plain. Postoperative injection included 30 mL of 0.5%  Marcaine plain. ESTIMATED BLOOD LOSS:  Less than 50 mL. HEMOSTASIS:  Includes anatomic dissection with Gelfoam and topical  thrombin. MATERIALS:  GraftJacket bioengineered skin substitute, an 8 cm x 4 cm  piece and a 2 cm x 4 cm piece that was used in its entirety. There was  no waste. INTRAOPERATIVE FINDINGS:  Showed that there was extensive soft tissue  necrosis with exposed fibula and talus on the lateral ulceration that  was present at the time of surgery. The fluid coming from the ankle and  the subtalar joint after it was opened did appear to be clear. The FHL  tendon was explored and there was no further purulent drainage noted. There was also FDL tendon sheath explored and there was no further  purulent drainage noted.   The patient had extensive necrotic soft

## 2023-05-03 VITALS
WEIGHT: 214.95 LBS | SYSTOLIC BLOOD PRESSURE: 128 MMHG | OXYGEN SATURATION: 93 % | DIASTOLIC BLOOD PRESSURE: 72 MMHG | RESPIRATION RATE: 16 BRPM | HEART RATE: 76 BPM | BODY MASS INDEX: 36.7 KG/M2 | TEMPERATURE: 98.6 F | HEIGHT: 64 IN

## 2023-05-03 LAB
ALBUMIN SERPL-MCNC: 2.5 G/DL (ref 3.4–5)
ANION GAP SERPL CALCULATED.3IONS-SCNC: 8 MMOL/L (ref 3–16)
BASOPHILS # BLD: 0 K/UL (ref 0–0.2)
BASOPHILS NFR BLD: 0.6 %
BUN SERPL-MCNC: 11 MG/DL (ref 7–20)
CALCIUM SERPL-MCNC: 8.6 MG/DL (ref 8.3–10.6)
CHLORIDE SERPL-SCNC: 101 MMOL/L (ref 99–110)
CO2 SERPL-SCNC: 28 MMOL/L (ref 21–32)
CREAT SERPL-MCNC: 0.6 MG/DL (ref 0.6–1.2)
DEPRECATED RDW RBC AUTO: 17.1 % (ref 12.4–15.4)
EOSINOPHIL # BLD: 0.2 K/UL (ref 0–0.6)
EOSINOPHIL NFR BLD: 3.5 %
GFR SERPLBLD CREATININE-BSD FMLA CKD-EPI: >60 ML/MIN/{1.73_M2}
GLUCOSE BLD-MCNC: 179 MG/DL (ref 70–99)
GLUCOSE BLD-MCNC: 216 MG/DL (ref 70–99)
GLUCOSE SERPL-MCNC: 203 MG/DL (ref 70–99)
HCT VFR BLD AUTO: 23.5 % (ref 36–48)
HGB BLD-MCNC: 7.8 G/DL (ref 12–16)
LYMPHOCYTES # BLD: 1.2 K/UL (ref 1–5.1)
LYMPHOCYTES NFR BLD: 24.1 %
MCH RBC QN AUTO: 27.6 PG (ref 26–34)
MCHC RBC AUTO-ENTMCNC: 33.4 G/DL (ref 31–36)
MCV RBC AUTO: 82.8 FL (ref 80–100)
MONOCYTES # BLD: 0.4 K/UL (ref 0–1.3)
MONOCYTES NFR BLD: 8.7 %
NEUTROPHILS # BLD: 3.1 K/UL (ref 1.7–7.7)
NEUTROPHILS NFR BLD: 63.1 %
PERFORMED ON: ABNORMAL
PERFORMED ON: ABNORMAL
PHOSPHATE SERPL-MCNC: 3.5 MG/DL (ref 2.5–4.9)
PLATELET # BLD AUTO: 251 K/UL (ref 135–450)
PMV BLD AUTO: 7.1 FL (ref 5–10.5)
POTASSIUM SERPL-SCNC: 4.1 MMOL/L (ref 3.5–5.1)
RBC # BLD AUTO: 2.84 M/UL (ref 4–5.2)
SODIUM SERPL-SCNC: 137 MMOL/L (ref 136–145)
WBC # BLD AUTO: 4.9 K/UL (ref 4–11)

## 2023-05-03 PROCEDURE — 6370000000 HC RX 637 (ALT 250 FOR IP): Performed by: NURSE PRACTITIONER

## 2023-05-03 PROCEDURE — 2580000003 HC RX 258: Performed by: INTERNAL MEDICINE

## 2023-05-03 PROCEDURE — 6370000000 HC RX 637 (ALT 250 FOR IP): Performed by: INTERNAL MEDICINE

## 2023-05-03 PROCEDURE — 80069 RENAL FUNCTION PANEL: CPT

## 2023-05-03 PROCEDURE — 85025 COMPLETE CBC W/AUTO DIFF WBC: CPT

## 2023-05-03 PROCEDURE — 6370000000 HC RX 637 (ALT 250 FOR IP)

## 2023-05-03 PROCEDURE — 6370000000 HC RX 637 (ALT 250 FOR IP): Performed by: STUDENT IN AN ORGANIZED HEALTH CARE EDUCATION/TRAINING PROGRAM

## 2023-05-03 PROCEDURE — 99232 SBSQ HOSP IP/OBS MODERATE 35: CPT | Performed by: INTERNAL MEDICINE

## 2023-05-03 PROCEDURE — 6360000002 HC RX W HCPCS: Performed by: INTERNAL MEDICINE

## 2023-05-03 PROCEDURE — 99232 SBSQ HOSP IP/OBS MODERATE 35: CPT | Performed by: NURSE PRACTITIONER

## 2023-05-03 PROCEDURE — 6360000002 HC RX W HCPCS

## 2023-05-03 RX ORDER — KETOROLAC TROMETHAMINE 15 MG/ML
15 INJECTION, SOLUTION INTRAMUSCULAR; INTRAVENOUS ONCE
Status: DISCONTINUED | OUTPATIENT
Start: 2023-05-03 | End: 2023-05-03

## 2023-05-03 RX ORDER — KETOROLAC TROMETHAMINE 15 MG/ML
15 INJECTION, SOLUTION INTRAMUSCULAR; INTRAVENOUS ONCE
Status: COMPLETED | OUTPATIENT
Start: 2023-05-03 | End: 2023-05-03

## 2023-05-03 RX ORDER — SENNA AND DOCUSATE SODIUM 50; 8.6 MG/1; MG/1
2 TABLET, FILM COATED ORAL DAILY
Status: DISCONTINUED | OUTPATIENT
Start: 2023-05-03 | End: 2023-05-03 | Stop reason: HOSPADM

## 2023-05-03 RX ADMIN — POLYETHYLENE GLYCOL 3350 17 G: 17 POWDER, FOR SOLUTION ORAL at 09:35

## 2023-05-03 RX ADMIN — METOPROLOL TARTRATE 50 MG: 50 TABLET, FILM COATED ORAL at 09:32

## 2023-05-03 RX ADMIN — Medication 1 CAPSULE: at 09:32

## 2023-05-03 RX ADMIN — KETOROLAC TROMETHAMINE 15 MG: 15 INJECTION, SOLUTION INTRAMUSCULAR; INTRAVENOUS at 06:13

## 2023-05-03 RX ADMIN — DOCUSATE SODIUM 50 MG AND SENNOSIDES 8.6 MG 2 TABLET: 8.6; 5 TABLET, FILM COATED ORAL at 18:05

## 2023-05-03 RX ADMIN — AMIODARONE HYDROCHLORIDE 200 MG: 200 TABLET ORAL at 09:32

## 2023-05-03 RX ADMIN — SODIUM CHLORIDE, PRESERVATIVE FREE 10 ML: 5 INJECTION INTRAVENOUS at 09:36

## 2023-05-03 RX ADMIN — MICONAZOLE NITRATE: 20 POWDER TOPICAL at 09:38

## 2023-05-03 RX ADMIN — CLOPIDOGREL BISULFATE 75 MG: 75 TABLET ORAL at 09:32

## 2023-05-03 RX ADMIN — ASPIRIN 81 MG: 81 TABLET, COATED ORAL at 09:32

## 2023-05-03 RX ADMIN — POTASSIUM BICARBONATE 40 MEQ: 391 TABLET, EFFERVESCENT ORAL at 09:32

## 2023-05-03 RX ADMIN — INSULIN LISPRO 4 UNITS: 100 INJECTION, SOLUTION INTRAVENOUS; SUBCUTANEOUS at 14:11

## 2023-05-03 RX ADMIN — LOSARTAN POTASSIUM 25 MG: 25 TABLET, FILM COATED ORAL at 09:32

## 2023-05-03 RX ADMIN — ENOXAPARIN SODIUM 30 MG: 100 INJECTION SUBCUTANEOUS at 09:36

## 2023-05-03 RX ADMIN — CEFTRIAXONE SODIUM 2000 MG: 2 INJECTION, POWDER, FOR SOLUTION INTRAMUSCULAR; INTRAVENOUS at 18:02

## 2023-05-03 RX ADMIN — FUROSEMIDE 40 MG: 40 TABLET ORAL at 09:32

## 2023-05-03 ASSESSMENT — PAIN SCALES - WONG BAKER
WONGBAKER_NUMERICALRESPONSE: 0

## 2023-05-03 ASSESSMENT — PAIN SCALES - GENERAL: PAINLEVEL_OUTOF10: 0

## 2023-05-03 NOTE — CARE COORDINATION
Case Management Assessment            Discharge Note                    Date / Time of Note: 5/3/2023 1:16 PM                  Discharge Note Completed by: Donna Wang    Patient Name: Leny Palma   YOB: 1960  Diagnosis: Diabetic foot infection ([facility] [E11.628, L08.9]  Purulent abscess [L02.91]  Diabetic ulcer of ankle associated with type 2 diabetes mellitus, with fat layer exposed ([facility] [Z29.125, L97.302]  Diabetic ulcer of back associated with diabetes mellitus due to underlying condition, with fat layer exposed ([facility] [W85.835, L98.422]   Date / Time: 4/12/2023 11:29 AM    Current PCP: Helen Mares patient: No    Hospitalization in the last 30 days: No       Advance Directives:  Code Status: Full Code  [place] DNR form completed and on chart: Not Indicated    Financial:  Payor: Romulo Morton / Plan: Romulo Morton / Product Type: *No Product type* /      Pharmacy:    Tammy Carney [address] [place] [address]  [address] [facility] [address] [address]  Phone: [phone] Fax: [phone]    [facility]/pharmacy #[id number] - Closed [place], [place] - [address] - P [phone] - F [phone]  [address] [facility] [address]  Phone: [phone] Fax: [phone]      Assistance purchasing medications?: Potential Assistance Purchasing Medications: No  Assistance provided by Case Management: None at this time    Does patient want to participate in local refill/ meds to beds program?: No    Meds To Beds General Rules:  1. Can ONLY be done Monday- Friday between 8:30am-5pm  2. Prescription(s) must be in pharmacy by 3pm to be filled same day  3. Copy of patient's insurance/ prescription drug card and patient face sheet must be sent along with the prescription(s)  4. Cost of Rx cannot be added to hospital bill. If financial assistance is needed, please contact unit  or ;   or  CANNOT provide pharmacy

## 2023-05-03 NOTE — PLAN OF CARE
Problem: Skin/Tissue Integrity  Goal: Absence of new skin breakdown  Description: 1. Monitor for areas of redness and/or skin breakdown  2. Assess vascular access sites hourly  3. Every 4-6 hours minimum:  Change oxygen saturation probe site  4. Every 4-6 hours:  If on nasal continuous positive airway pressure, respiratory therapy assess nares and determine need for appliance change or resting period. Outcome: Progressing  Note: Patient refused turning and repositioning. Patient had pillow support, an air redistribution mattress, and has a sacral heart. Problem: Safety - Adult  Goal: Free from fall injury  5/3/2023 0545 by Franky Ho RN  Outcome: Progressing  Flowsheets (Taken 5/3/2023 0545)  Free From Fall Injury: Instruct family/caregiver on patient safety  Note: Pt is a Fall Risk. See Fredrich Halt Fall Risk Score. Pt bed in low position and side rails up. Call light and belongings in reach. Pt encouraged to call for assistance. Will continue with hourly rounds for PO intake, pain needs, and repositioning as needed. Problem: Respiratory - Adult  Goal: Achieves optimal ventilation and oxygenation  Outcome: Completed  Flowsheets (Taken 5/3/2023 0545)  Achieves optimal ventilation and oxygenation:   Assess for changes in respiratory status   Assess for changes in mentation and behavior  Note: On Room air.

## 2023-05-03 NOTE — DISCHARGE SUMMARY
INTERNAL MEDICINE DEPARTMENT AT [address] [place] [address]  DISCHARGE SUMMARY    Patient ID:  Claudeen Sorrow                                             Discharge Date:  5/3/2023   Patient's PCP:  Jeremie Cruz                                          Discharge Physician:  Noy Gibson MD  Admit Date:  4/12/2023   Admitting Physician:  Abimael Galvez MD      DISCHARGE DIAGNOSES:  Present on Admission:   Diabetic infection of right foot ([address]   PAF (paroxysmal atrial fibrillation) ([address]   Diabetes mellitus type 2 in obese ([address]   History of CVA in adulthood   Diabetic foot infection ([address]   History of ST elevation myocardial infarction (STEMI)   Elevated troponin   Ischemic cardiomyopathy   Diabetic polyneuropathy associated with type 2 diabetes mellitus ([address]   Charcot ankle, right   Purulent abscess   Encounter for palliative care   Atrial fibrillation with RVR ([address]   Coronary artery disease involving native coronary artery of native heart without angina pectoris   S/P coronary angiogram          Follow-up Appointments:  Podiatry + wound care - Dr. Yessi Hwang   Cardiology - Dr. Lynne Toney  Primary care provider in 2 weeks        Hospital Course:  Mrs. Claudeen Sorrow is a 56-year-old  female with a medical history significant for recent acute inferior STEMI (3/26/23) without LHC/PCI, paroxysmal Afib, uncontrolled T2DM, diabetic Charcoat right foot, recent suicide attempt (OD on morphine pills), remote h/o CVA with RUE/RLE motor weakness, and sacral decubitus ulcer, who presented to the ED from her rehab facility on 4/12/23 with new-onset purulent drainage from a previously known left lateral malleolus wound. She had a recent hospital admission to [address] from 3/26 to 4/11/23 for which she was treated for DKA, Streptococcous bacteremia, septic shock, and cardiogenic shock with acute inferior wall MI.   [facility] where her nurses were cleaning her

## 2023-05-03 NOTE — PLAN OF CARE
Problem: Safety - Adult  Goal: Free from fall injury  5/3/2023 0545 by Greta Carmona RN  Outcome: Progressing  Flowsheets (Taken 5/3/2023 0545)  Free From Fall Injury: Instruct family/caregiver on patient safety  Note: Pt is a Fall Risk. See Pietro Rogers Fall Risk Score. Pt bed in low position and side rails up. Call light and belongings in reach. Pt encouraged to call for assistance. Will continue with hourly rounds for PO intake, pain needs, and repositioning as needed.         Problem: Discharge Planning  Goal: Discharge to home or other facility with appropriate resources  Outcome: Progressing     Problem: Nutrition Deficit:  Goal: Optimize nutritional status  Outcome: Progressing

## 2023-05-03 NOTE — PLAN OF CARE
Patient planned to discharge today. Wound vac taken down and wet to dry dressing in place.  SNF to apply wound vac upon arrival.     REBECCA Delvalle [facility]   Podiatric Resident PGY1  Pager [phone] or Kim  5/3/2023, 4:40 PM

## 2023-05-04 ENCOUNTER — TELEPHONE (OUTPATIENT)
Dept: WOUND CARE | Age: 63
End: 2023-05-04

## 2023-05-04 DIAGNOSIS — E11.621 DIABETIC ULCER OF TOE OF RIGHT FOOT ASSOCIATED WITH TYPE 2 DIABETES MELLITUS, WITH FAT LAYER EXPOSED (HCC): ICD-10-CM

## 2023-05-04 DIAGNOSIS — L97.512 DIABETIC ULCER OF TOE OF RIGHT FOOT ASSOCIATED WITH TYPE 2 DIABETES MELLITUS, WITH FAT LAYER EXPOSED (HCC): ICD-10-CM

## 2023-05-04 DIAGNOSIS — L08.9 DIABETIC INFECTION OF RIGHT FOOT (HCC): Primary | ICD-10-CM

## 2023-05-04 DIAGNOSIS — E11.628 DIABETIC INFECTION OF RIGHT FOOT (HCC): Primary | ICD-10-CM

## 2023-05-04 NOTE — PROGRESS NOTES
0214: pt arrived in pacu  0855:  Dr. Dov Bhakta here to see the pt, consents reviewed  0900:  OR RN and CRNA here, anesthesia consent signed  2696:  pt sent to OR
CC: hx MI  HPI:    Javier Haney is a 65 y.o. female with a past medical history of stroke, atrial fibrillation, MI, and recent hospitalization for septic shock who was admitted with diabetic foot infection. She wanted to come to [facility] as her physicians are here. The patient was discharged from the [place] on 2023 with DKA/septic shock/strep bacteremia/STEMI. Peak HS troponin 4185     She did not undergo cardiac catheterization at the time. She has not had any chest discomfort and does not want any invasive procedures. Had AF RVR. Started on Amio and metoprolol and now sinus     LHC demonstrated multivessel CAD. Pt turned down for CABG. No s/p coronary angioplasty with 7 stents     ---------------------------------------------------------------------------  S    Denies cp or sob. Tele: Sinus     O:  Physical Exam:  /65   Pulse 73   Temp 98.8 °F (37.1 °C) (Oral)   Resp 18   Ht 5' 4\" (1.626 m)   Wt 214 lb 15.2 oz (97.5 kg)   SpO2 93%   BMI 36.90 kg/m²        I.O's= -15 L     Weight  Admission: Weight - Scale: 227 lb 11.2 oz (103.3 kg)   Today: Weight - Scale: 214 lb 15.2 oz (97.5 kg)    CBC:   Recent Labs     23  0400 23  1330 23  0639 23  0352   WBC 4.6  --   --  5.1 4.9   HGB 8.7*   < > 8.1* 7.8* 7.8*   HCT 26.4*   < > 25.1* 24.0* 23.5*   MCV 83.3  --   --  83.6 82.8     --   --  238 251    < > = values in this interval not displayed. BMP:   Recent Labs     23  0639 23  0352    138 137   K 3.6 4.0 4.1    101 101   CO2 31 30 28   PHOS 3.5 3.9 3.5   BUN 11 13 11   CREATININE <0.5* 0.6 0.6     Estimated Creatinine Clearance: 112 mL/min (based on SCr of 0.6 mg/dL).     Mag:   Lab Results   Component Value Date/Time    MG 1.90 2023 06:39 AM     Imagin2023 PCI  Stent to the Cx proximal and mid and distal with JUDIE 3 flow  Stents to the LAD mid and proximal withTIMI 3
CC: hx MI  HPI:    Sheryl Mock is a 65 y.o. female with a past medical history of stroke, atrial fibrillation, MI, and recent hospitalization for septic shock who was admitted with diabetic foot infection. She wanted to come to [facility]. as her physicians are here. The patient was discharged from the [place] on 2023 with DKA/septic shock/strep bacteremia/STEMI. Peak HS troponin 4185     She did not undergo cardiac catheterization at the time. She has not had any chest discomfort and does not want any invasive procedures. Had AF RVR. Started on Amio and metoprolol and now sinus     LHC demonstrated multivessel CAD. Pt turned down for CABG. No s/p coronary angioplasty with 7 stents     ---------------------------------------------------------------------------  S    Denies cp or sob. Tele: Sinus     O:  Physical Exam:  /65   Pulse 81   Temp 98.2 °F (36.8 °C) (Axillary)   Resp 19   Ht 5' 4\" (1.626 m)   Wt 215 lb 2.7 oz (97.6 kg)   SpO2 98%   BMI 36.93 kg/m²        I.O's= -14.4 L     Weight  Admission: Weight: 227 lb 11.2 oz (103.3 kg)   Today: Weight: 215 lb 2.7 oz (97.6 kg)    CBC:   Recent Labs     23  0521 23  0400 23  1330 23  0639   WBC 4.6 4.6  --   --  5.1   HGB 8.6* 8.7* 8.5* 8.1* 7.8*   HCT 26.3* 26.4* 26.4* 25.1* 24.0*   MCV 84.3 83.3  --   --  83.6    281  --   --  238     BMP:   Recent Labs     23  0400 23  18123  0639    138 137   K 3.7 3.9 3.6    100 100   CO2 30 31 31   PHOS 3.8 4.0 3.5   BUN 11 14 11   CREATININE <0.5* <0.5* <0.5*     Estimated Creatinine Clearance: 134 mL/min (based on SCr of 0.5 mg/dL).     Mag:   Lab Results   Component Value Date/Time    MG 1.90 2023 06:39 AM     Imagin2023 PCI  Stent to the Cx proximal and mid and distal with JUDIE 3 flow  Stents to the LAD mid and proximal withTIMI 3 flow  Stents to the OM1 ostium and mid OM1 with JUDIE 3
Comprehensive Nutrition Assessment    RECOMMENDATIONS:  PO Diet: Continue NPO, once cleared from surgery, ADAT to Regular;Low Fat/Low Chol/High Fiber/2 gm Na  ONS: Once diet adv, restart Ensure Max bid(straw or moses)  Offer Expedite gelatin for wound healing as pt dislikes Elie  Nutrition Education: Education not indicated     NUTRITION ASSESSMENT: Follow-up. Pt out of room during attempted encounter for I&D procedure. Pt currently NPO to be restarted on 4CC w/ oral ONS pending. Current nutritional interventions continue to be appropriate. Will continue to monitor. Admission/PMH: PMHx of acute inferior wall MI (3/26/23), paroxysmal Afib, uncontrolled T2DM, diabetic Charcoat right foot, recent suicide attempt (OD on morphine pills), remote h/o CVA with RUE/RLE motor weakness, and sacral decubitus ulcer    MALNUTRITION ASSESSMENT  Context of Malnutrition: Acute Illness   Malnutrition Status: At risk for malnutrition (Comment)  Findings of the 6 clinical characteristics of malnutrition (Minimum of 2 out of 6 clinical characteristics is required to make the diagnosis of moderate or severe Protein Calorie Malnutrition based on AND/ASPEN Guidelines):  Energy Intake:  Mild decrease in energy intake (Comment)  Weight Loss:  Greater than 5% over 1 month     Body Fat Loss:  No significant body fat loss     Muscle Mass Loss:  No significant muscle mass loss    Fluid Accumulation:  No significant fluid accumulation     Strength:  Not Performed    NUTRITION DIAGNOSIS   Inadequate protein intake related to increase demand for energy/nutrients as evidenced by wounds    Nutrition Monitoring and Evaluation:   Food/Nutrient Intake Outcomes:  Diet Advancement/Tolerance  Physical Signs/Symptoms Outcomes:  Biochemical Data, Nutrition Focused Physical Findings, Skin, Weight     OBJECTIVE DATA: Significant to nutrition assessment  Nutrition Related Findings: lbm 5/1; nonpitting edema;   Wounds: Stage III, Pressure Injury,
ID Follow-up NOTE    CC:   L diabetic foot infection  Antibiotics: Ceftriaxone     Admit Date: 2023  Hospital Day: 21    Subjective:     POD#1 - I&D (down to bone cortex), no purulence    No complaint, NO foot pain    Objective:     Afebrile > 72 hr    Patient Vitals for the past 8 hrs:   BP Temp Temp src Pulse Resp SpO2 Weight   23 0803 115/65 98.2 °F (36.8 °C) Axillary 81 19 98 % --   23 0634 -- -- -- 86 -- -- --   23 0313 (!) 104/56 98.7 °F (37.1 °C) Axillary 79 20 97 % 215 lb 2.7 oz (97.6 kg)       I/O last 3 completed shifts: In:  [P.O.:210; I.V.:600]  Out:  [Urine:2000; Blood:10]  No intake/output data recorded. EXAM:  GENERAL: No apparent distress. 3L - 96% FiO2  HEENT: Membranes moist, no oral lesion  NECK:  Supple, no lymphadenopathy  LUNGS: Clear b/l, no rales, no dullness  CARDIAC: RRR, no murmur appreciated  ABD:  + BS, soft / NT  EXT:  L foot /LE dressing - ACE  NEURO: No focal neurologic findings  PSYCH: Orientation, sensorium, mood normal  LINES:  L PICC, pplaced        Data Review:  Lab Results   Component Value Date    WBC 5.1 2023    HGB 7.8 (L) 2023    HCT 24.0 (L) 2023    MCV 83.6 2023     2023     Lab Results   Component Value Date    CREATININE <0.5 (L) 2023    BUN 11 2023     2023    K 3.6 2023     2023    CO2 31 2023       Hepatic Function Panel:   Lab Results   Component Value Date/Time    ALKPHOS 150 2023 04:00 AM    ALT 72 2023 04:00 AM    AST 29 2023 04:00 AM    PROT 5.5 2023 04:00 AM    BILITOT 0.4 2023 04:00 AM    BILIDIR <0.2 2023 04:00 AM    IBILI see below 2023 04:00 AM    LABALBU 2.7 2023 06:39 AM       MICRO:   BC no growth   Wound cult + rare growth GBS   BC x 2 no growth       IMAGIN/12 L foot / ankle x-ray  No evidence of acute osseous abnormality.    Mild to moderate degenerative
Internal Medicine Progress Note    Patient Name: Melani Euceda   Patient : 1960   Date: 2023   Admit Date: 2023     CC: Wound Infection (Pt presents with bilateral wounds to feet and possible sepsis. )       Interval History     NSR on telemetry, HDS this AM  Saturating well on 1L NC  UPDATE:  Hypotensive after I&D with graft application to LLE today (). Entered AFRVR. Likely 2/2 hypovolemia. Patient seen and examined at bedside before her procedure this morning. She denies sweats, chills, confusion, chest pain/pressure/tightness, palpitations, dyspnea, headaches, lightheadedness, dizziness, nausea, vomiting, constipation, diarrhea. ROS:  As per interval history above. Objective     I/Os:  Date 23 0000 - 23 2359   Shift 4671-8403 9169-3876 1666-9015 24 Hour Total   INTAKE   I.V.(mL/kg)  600(6.1)  600(6.1)   Shift Total(mL/kg)  600(6.1)  600(6.1)   OUTPUT   Urine(mL/kg/hr) 200(0.3)   200   Blood(mL/kg)  10(0.1)  10(0.1)   Shift Total(mL/kg) 200(2) 10(0.1)  210(2.1)   Weight (kg) 97.9 97.9 97.9 97.9       Vital Signs:  Patient Vitals for the past 8 hrs:   BP Temp Temp src Pulse Resp SpO2   23 (!) 84/52 99.3 °F (37.4 °C) Oral (!) 142 16 96 %   23 1937 (!) 69/50 -- -- (!) 162 -- --   23 1600 (!) 91/52 98.8 °F (37.1 °C) Oral 84 13 100 %   23 1505 (!) 97/57 -- -- -- -- --   23 1426 (!) 90/48 -- -- 86 15 99 %       Physical Exam:  Constitutional:  Pleasant, chronically ill-appearing, obese, elderly female seen lying in bed, in no acute distress. HEENT:  Normocephalic. No scleral icterus, conjunctival injection, or lacrimation. No rhinorrhea or epistaxis. Moist, pink oral mucous membranes. Cardiovascular:  No apparent JVD. RRR. No murmurs, rubs, or gallops. Radial pulses 2+. Capillary refill < 2 sec. Pulmonary:  Able to speak in full sentences w/o frequent pauses on 1L NC, saturating at 96%. No perioral cyanosis.   Diminished
Internal Medicine Progress Note    Patient Name: Zehra Marmolejo   Patient : 1960   Date: 5/3/2023   Admit Date: 2023     CC: Wound Infection (Pt presents with bilateral wounds to feet and possible sepsis. )       Interval History     POD #1:  LLE I&D, graft jacket application, wound vac placement  Afebrile  Saturating at % on 1L NC    Patient seen and examined at bedside. She reports doing \"good\" overall. She denies sweats, chills, confusion, chest pain/pressure/tightness, palpitations, dyspnea, headaches, lightheadedness, dizziness, nausea, vomiting, constipation, and diarrhea. ROS:  As per interval history above. Objective     I/Os:  Date 23 0000 - 23 2359   Shift 6404-3810 9314-3726 8276-5232 24 Hour Total   INTAKE   Shift Total(mL/kg)       OUTPUT   Urine(mL/kg/hr) 600(0.8)   600   Shift Total(mL/kg) 600(6.2)   600(6.2)   Weight (kg) 97.5 97.5 97.5 97.5       Vital Signs:  Patient Vitals for the past 8 hrs:   BP Temp Temp src Pulse Resp SpO2 Weight   23 0753 114/63 98.8 °F (37.1 °C) Oral 74 18 93 % --   23 0337 125/66 99 °F (37.2 °C) Oral 75 18 95 % 214 lb 15.2 oz (97.5 kg)   23 0200 -- -- -- 77 -- -- --       Physical Exam:  Constitutional:  Pleasant, chronically ill-appearing, obese, elderly female seen lying in bed, in no acute distress. HEENT:  Normocephalic. No scleral icterus, conjunctival injection, or lacrimation. No rhinorrhea or epistaxis. Moist, pink oral mucous membranes. Cardiovascular:  No apparent JVD. RRR. No murmurs, rubs, or gallops. Radial pulses 2+. Capillary refill < 2 sec. Pulmonary:  Able to speak in full sentences w/o frequent pauses on 1L NC, saturating at 96%. No perioral cyanosis. Diminished breath sounds throughout all lung fields, auscultation limited by body habitus. Musculoskeletal:  No BLE edema. Bilateral lower legs/feet bandages are clean, dry, and intact. Skin:  Warm, non-diaphoretic, acyanotic.
Occupational Therapy  No Treatment  Pt off floor for I&D this am. Will continue OT POC. Darlin Betancourt.  [address] [facility], OTR/L [id number]
Occupational Therapy  Occupational Therapy  Daily Treatment Note  Patient Name: Ana Cristina Morfin  MRN: [id number]    Assessment:   Pt limited by NWB for LLE and decreased R side function. Pt completing grooming tasks and UE exercise seated EOB. Sitting balance requiring Mod A progressing to SBA and bed mobility requiring assist x2. Pt would benefit from inpt OT. Continue Per Rob 41. Discharge Recommendations:     Ana Cristina Morfin scored a 10/24 on the AM-PAC ADL Inpatient form. Current research shows that an AM-PAC score of 17 or less is typically not associated with a discharge to the patient's home setting. Based on the patient's AM-PAC score and their current ADL deficits, it is recommended that the patient have 3-5 sessions per week of Occupational Therapy at d/c to increase the patient's independence. Please see assessment section for further patient specific details. If patient discharges prior to next session this note will serve as a discharge summary. Please see below for the latest assessment towards goals. Equipment Needs:      Chart Reviewed: Yes     Restrictions/Precautions: Fall Risk (high fall risk) Other position/activity restrictions: NWB left LE, WBAT right LE in post op shoe; up with assist     Additional Pertinent Hx: 66 yo female admit from rehab facility p/w new-onset purulent drainage from a previously known left lateral malleolus wound. MRI ankle suspicious for abscess, tenosynovitis possibly infectious in nature, and sequelae of chronic neuropathic joint v small bone infarcts v superimposed osteomyelitis/septic arthritis. Pt s/p LEFT FOOT INCISION AND DEBRIDEMENT/ POSSIBLE GRAFT APPLICATION/ POSSIBLE WOUND VACUUM APPLICATION on 5/1. PMHx: recent acute inferior wall MI (3/26/23), paroxysmal Afib, uncontrolled T2DM, diabetic Charcoat right foot, recent suicide attempt (OD on morphine pills), remote h/o CVA with RUE/RLE motor weakness, and sacral decubitus ulcer.       Diagnosis:
PACU Transfer to Floor Note    Procedure(s):  LEFT FOOT INCISION AND DEBRIDEMENT/ POSSIBLE GRAFT APPLICATION/ POSSIBLE WOUND VACUUM APPLICATION  . Current Allergies: Theophyllines, Apixaban, Metformin, and Morphine    Pt meets criteria as per Pablito Score and ASPAN Standards to transfer to next phase of care. Recent Labs     04/30/23  2154 05/01/23  1041   POCGLU 277* 141*       Vitals:    05/01/23 1151   BP: 89/62   Pulse: 78   Resp: 16   Temp: 97.9 °F (36.6 °C)   SpO2: 100%     Vitals within 20% of pt's admission vitals as per PABLITO SCORE    SpO2: 100 %    O2 Flow Rate (L/min): 2 L/min      Intake/Output Summary (Last 24 hours) at 5/1/2023 1154  Last data filed at 5/1/2023 1030  Gross per 24 hour   Intake 600 ml   Output 2060 ml   Net -1460 ml       Pain assessment:  none    Pain Level: 0    Patient was assessed for alterations to skin integrity. There were not alterations observed. Is patient incontinent: yes    Handoff report given at bedside.     in room      5/1/2023 11:54 AM
Patient arrived from OR to PACU #7 s/p LEFT FOOT INCISION AND DEBRIDEMENT/ POSSIBLE GRAFT APPLICATION/ POSSIBLE WOUND VACUUM APPLICATION (Left) per . Attached to PACU monitoring device, report received from CRNA who stated no problems intraoperatively. Arrived wakeful, denied pain when asked. Blood Sugar 141, wound vac in place and plugged in.  Patient c/o pain on bottom from \"ulcers\" repositioned and pillow placed under her per request.
Patient converted back to sinus rhythm. HR 92 with a BP of 103/62. On call residents at bedside when converted.  Electronically signed by Erick Ansari RN on 5/2/2023 at 12:17 AM
Physical Therapy  Facility/Department: [place] [address] [facility]U  Physical Therapy Treatment NOte    Name: Donte Delgado  : 1960  MRN: [id number]  Date of Service: 2023    Discharge Recommendations:  Donte Delgado scored a 8/24 on the AM-PAC short mobility form. Current research shows that an AM-PAC score of 17 or less is typically not associated with a discharge to the patient's home setting. Based on the patient's AM-PAC score and their current functional mobility deficits, it is recommended that the patient have 3-5 sessions per week of Physical Therapy at d/c to increase the patient's independence. Please see assessment section for further patient specific details. If patient discharges prior to next session this note will serve as a discharge summary. Please see below for the latest assessment towards goals. Patient would benefit from continued therapy after discharge   PT Equipment Recommendations  Equipment Needed:  (defer)      Patient Diagnosis(es): The primary encounter diagnosis was Purulent abscess. Diagnoses of Diabetic ulcer of back associated with diabetes mellitus due to underlying condition, with fat layer exposed ([address] and Diabetic ulcer of ankle associated with type 2 diabetes mellitus, with fat layer exposed ([address] were also pertinent to this visit. Past Medical History:  has a past medical history of Arthritis, Asthma, Blindness, legal, CVA (cerebral vascular accident) ([address] Depression, Diabetes mellitus ([address] ION (ischemic optic neuropathy), Osteomyelitis ([address] and TIA (transient ischemic attack). Past Surgical History:  has a past surgical history that includes  section; Cholecystectomy; orthopedic surgery; back surgery; Hysterectomy; other surgical history (Left, 2014); Foot surgery (Left, 2014); Foot surgery (Left, 2014); Total abdominal hysterectomy w/ bilateral salpingoophorectomy; Elbow surgery (Left);  Upper gastrointestinal endoscopy (N/A, 3/9/2023);
Physical Therapy  Hallie Ortega    Chart reviewed. PT attempted to see patient for follow up treatment although she is currently off unit for surgical intervention. Plan to continue to follow per plan of care.     Geovanny Reid PT, DPT [id number]
Physical Therapy  Maryann Maynard    Chart reviewed. PT attempted to see patient for follow up treatment although patient currently declining to participate - \"I am having some bowel issues - I can't do it today. \"  Patient hopeful to transfer to SNF later this date. Plan to continue to follow per plan of care.     Tino Lawrence PT, DPT [id number]
Pre-Operative Note  Resident Note     Patient: Melani Euceda     Procedure: Incision and drainage with possible application of graft and application of wound vac left lower extremity. Clearance for surgery: Yes, per Internal Medicine and cardiology    Consent: Procedure was discussed at length with patient and all questions were answered to completion, consent obtained and placed in chart     Labs:        Recent Labs     04/30/23  0521 05/01/23  0400   WBC 4.6 4.6   HGB 8.6* 8.7*   HCT 26.3* 26.4*   MCV 84.3 83.3    281        Recent Labs     04/30/23  1717 05/01/23  0400    140   K 3.9 3.7    101   CO2 32 30   PHOS 3.9 3.8   BUN 14 11   CREATININE <0.5* <0.5*      No results for input(s): AST, ALT, ALB, BILIDIR, BILITOT, ALKPHOS in the last 72 hours. No results for input(s): LIPASE, AMYLASE in the last 72 hours. Recent Labs     05/01/23  0400   INR 1.15      No results for input(s): CKTOTAL, CKMB, CKMBINDEX, TROPONINI in the last 72 hours.     Pre-op Medications:   Current Facility-Administered Medications   Medication Dose Route Frequency Provider Last Rate Last Admin    [Held by provider] enoxaparin Sodium (LOVENOX) injection 30 mg  30 mg SubCUTAneous BID Monse Meeks DO   30 mg at 04/30/23 2213    insulin glargine (LANTUS) injection vial 7 Units  7 Units SubCUTAneous Nightly Allie Ruby MD   7 Units at 04/30/23 2216    potassium bicarb-citric acid (EFFER-K) effervescent tablet 40 mEq  40 mEq Oral BID Allie Ruby MD   40 mEq at 04/30/23 1222    insulin lispro (HUMALOG) injection vial 0-16 Units  0-16 Units SubCUTAneous TID WC Allie Ruby MD   4 Units at 04/30/23 1854    insulin lispro (HUMALOG) injection vial 0-4 Units  0-4 Units SubCUTAneous Nightly Allie Ruby MD   0 Units at 04/27/23 2152    amiodarone (CORDARONE) tablet 200 mg  200 mg Oral Daily Beckiia Smoker, APRN - CNP   200 mg at 04/30/23 0941    clopidogrel (PLAVIX) tablet 75 mg  75 mg Oral Daily
Pt remains RVR following 250 ml ns bolus- HR 130s-150s. BP 96/73. On call resident notified.     Electronically signed by Summer Dan RN on 3/9/8392 at 11:17 PM
Pt. Discharged via EMS. Pt. Seen by MD and every questions were answered. Peripheral IV was removed, however the PICC line was kept in place to continue IV antibiotics outpatient. Report was given to Мария.    Electronically signed by Ramy Arellano RN on 5/3/2023 at 8:16 PM
Pts HR sustaining 150s resident notified on way to bedside
Renal function panel attempted to be drawn from PICC line, but RN only got 3ml of blood return from line. Tried repositioning arm, but did not get any further return. MD notified, asked to try again later. Lab called and asked to draw from patient, but patient refused to be stuck.
ventricular pressure 15 mmHg  Aortic pressure 98/56 mmHg  Coronary anatomy:   The left main coronary artery is calcified with adequate flow. Left anterior descending artery is normal  Circumflex artery has calcification and several long segment of stenosis. Up to 95% proximal and mid vessel. The right coronary artery is a dominant vessel has significant calcification. There is mid vessel stenosis of 85%. Left ventriculogram shows ejection fraction of 25 %. Global and inferior wall hypokinetic segments. Impression: Three-vessel CAD. We will ask for surgical consultation. Complications: none  Plan:  Surgical consideration. She needs optimization of lipid management. May need to be treated with coronary angioplasty and stenting if surgery declines. She does have significant issues with some infection and therefore had to be considered in our decisions. 4/13/2023 LE arterial duplex     Tech Comments   Right   Technically difficult due to bandages and wounds at distal calf to foot   bilaterally. The ankle/brachial index is 1.17 (,  mmHg). Multiphasic flow in the common femoral artery indicates no significant   aorto-iliac inflow disease. Multiphasic flow in the superficial femoral artery and popliteal artery with   no focal lesions or plaque. The posterior tibial artery appears patent proximal to the bandage. The anterior tibial artery is poorly seen proximal to the bandage, however   flow is seen and it appears patent. Left   The ankle/brachial index is 1.13 (,  mmHg). Multiphasic flow in the common femoral artery indicates no significant   aorto-iliac inflow disease. Multiphasic flow in the superficial femoral artery and popliteal artery with   no focal lesions or plaque. The posterior tibial artery appears patent proximal to the bandage. The anterior tibial artery is not visualized. No previous studies for comparison.        ECHO from 3/27/2023 [place]
17 g Oral Daily    lidocaine  30 mL IntraDERmal Once    aspirin  81 mg Oral Daily    metoprolol tartrate  50 mg Oral BID    b complex vitamins  1 capsule Oral Daily       Continuous Infusions:   sodium chloride      sodium chloride 10 mL/hr at 04/27/23 0951    sodium chloride      dextrose         PRN Meds:  sodium chloride flush, sodium chloride, meperidine, HYDROmorphone, HYDROmorphone, diphenhydrAMINE, labetalol **OR** hydrALAZINE, sodium chloride flush, sodium chloride, acetaminophen, sodium chloride, perflutren lipid microspheres, ondansetron **OR** ondansetron, glucose, dextrose bolus **OR** dextrose bolus, glucagon (rDNA), dextrose      Assessment:     64 yo woman  PMH - CAD (STEMI 3/26), AF, DM, CVA, obesity (BMI 38), depression / hx SI  PSurgHx - back, [facility]Y, SOCORRO/BSO, L foot surg 2014     Admit [facility] 3/26 - 4/11 with DKA, MI, no heart cath done, UTI, 'Strep bacteremia'     Presents with L foot wound drainage from ECF  Poor historian - as far as I can tell, no pain, no fever  In ED afeb, WBC   Seen by Podiatry  MRI 4/13 with hindfoot signal, fluid collections, tenosynovitis  Wound cult - rare GBS     F/u MRI 4/20 with abscesses (medial and posterior ankle), flexor digitorum longus tenosynovitis, marrow edema      IMP/  Medically complicated - s/p cath / PCI 4/26    DM / neuropathy / charcot neuroarthropathy  L foot wound with abscesses / tenosynovitis / probable extension osteomyelitis  POD#0 I&D, graft jacket placed     Coccyx PU      Plan:     Cont Ceftriaxone    Await dictated OR report  Wound care per Podiatry     Anticipate course iv antibiotics after discharge (followed by po suppression)  See NALLELY     Medical Decision Making:   The following items were considered in medical decision making:  Discussion of patient care with other providers  Reviewed clinical lab tests  Reviewed radiology tests  Reviewed other diagnostic tests/interventions  Independent review of radiologic images - will review MRI with
obesity (BMI 38), depression / hx SI  PSurgHx - back, [facility], SOCORRO/BSO, L foot surg 2014     Admit BN 3/26 - 4/11 with DKA, MI, no heart cath done, UTI, 'Strep bacteremia'     Presents with L foot wound drainage from ECF  Poor historian - as far as I can tell, no pain, no fever  In ED afeb, WBC   Seen by Podiatry  MRI 4/13 with hindfoot signal, fluid collections, tenosynovitis  Wound cult - rare GBS     F/u MRI 4/20 with abscesses (medial and posterior ankle), flexor digitorum longus tenosynovitis, marrow edema      IMP/  Medically complicated - s/p cath / PCI 4/26    DM / neuropathy / charcot neuroarthropathy  L foot wound with abscesses / tenosynovitis / probable extension osteomyelitis  POD#1 I&D to bone (talus), graft jacket placed    - exposed peroneal tendons, talus / fibula,    - no new cult / path sent   Coccyx PU      Plan:     Cont Ceftriaxone    Wound care per Podiatry     Anticipate course iv antibiotics after discharge (followed by po suppression)  See NALLELY     Medical Decision Making:   The following items were considered in medical decision making:  Discussion of patient care with other providers  Reviewed clinical lab tests  Reviewed radiology tests  Reviewed other diagnostic tests/interventions  Independent review of radiologic images - will review MRI with Radiologist   Microbiology cultures and other micro tests reviewed      Discussed with pt  Discussed with Podiatry Resident on MD Noel    INFUSION ORDERS:  Ceftriaxone 2 gm iv daily through 5/22 (then po Cefadroxil 500 mg po bid )  - Diagnosis - L DFI, osteomyelitis  - First dose given in hospital  - PICC   - Disposition / date discharge  - Check CBC w diff, CMP, ESR, CRP every Mon or Melum 64 result to [phone]  - Call with antibiotic / infusion issues, [phone]  - Call with any change in status, transfer in or out of a facility or to hospital - [phone]  - No f/u in outpatient ID office necessary
I&D to bone cortex, GraftJacket application, wound vac placed  ID consulted  Cont CTX (4/27-through 5/22), then transition to cefadroxil 500 mg PO BID  PICC line placed (4/22) - planning for IV Abx outpt  Palliative care consulted to discuss goals of care  PT/OT eval and tx  CBC w/diff daily        Acute hypoxic respiratory failure likely 2/2 hypervolemia in the setting of HFrEF (LVEF 25-30%) - Resolved  Lasix 40 mg QD  Need to be gentle with diuresis as pt is volume sensitive. Previously hypotensive on 4/19 for which she required  cc bolus to bring BP up from 66/52 to 100/80. In total, she received 1L to sustain an appropriate BP. AFRVR - Recurring likely 2/2 hypovolemia. No additional episodes since 5/1  Paroxysmal A Fib   Acute inferior STEMI w/o LHC, PCI (3/26/23)  HFrEF, not in acute exacerbation   CAD s/p APURVA to LCx x3, obtuse marginal x2, LAD x2 (4/27/23)  KATHY (4/20/23): Moderately increased LV wall thickness. LVEF 25-30%. Hypokinesis of mid to apical anterolateral and apical anteroseptal walls. G2DD. Mild to mod MR. Mild TR. Trivial circumferential pericardial effusion. LHC (4/25/23):  L main coronary:  calcified w adequate flow. LCx:  up to 95% proximal and mid-vessel stenosis. RCA:  85% mid-vessel stenosis.    LHC + PCI (4/26/23):  7 APURVA placed (LCx x3, obtuse marginal x2, LAD x2)  Plan:  Consider adding SGLT2i outpatient  Cont Effer-K 40 mg BID  Continuous monitoring on telemetry  BMP, Mg daily - replete as needed to keep K > 4, Mg > 2  Cardiology consulted  Cont amiodarone 200 mg PO BID to QD  Cont clopidogrel, Xarelto, atorvastatin, ASA, losartan, Lopressor  Restart anticoagulation for [employer] - pt continues to adamantly refuse Eliquis the medication despite discussions regarding risks such as a recurrent stroke  Strict I/Os  Daily weights         Probable sleep apnea  4/27:  Desaturating event overnight down to 20%, patient was awoken with sternal rub  5/1:  Pt refusing to wear
ASSESSMENT/PLAN  -S/p  Incision and drainage with application of graft jacket and application of wound Vac LLE  (5/1/23)  -Multiple Full-thickness ulcerations x2, left ankle-Valiente stage III  -Full-thickness ulceration, right foot-Valiente stage II  -Partial-thickness ulcerations, right foot  -Charcot neuroarthropathy    -Patient examined and evaluated at bedside   -VSS, no leukocytosis noted (WBC 5.1)  -CRP 60.2, ESR 56.   -Prealbumin 10.2, oral wound healing supplement ordered.   -HbA1c 8.3  -Blood cultures 1 and 2: NG  -Wound culture: Strep agalactiae (Beta Strep Group B)  -Imaging reviewed with impressions noted above  -Dressing applied to right LE consisting of betadine paint and mepilex border.  -Dressing to left LE intact with excellent seal noted to the wound vac and continuous pressure at 125 mmHg. -ID outpatient recommendations: Ceftriaxone 2 gm iv daily through 5/22 (then po Cefadroxil 500 mg po bid )  -NWB to LLE and WBAT to RLE in surgical shoe  -Recommend patient wear Prevalon boots or keep heels offloaded at all times to prevent further deep tissue injury.  -No further podiatric intervention planned during this admission. Procedure felt to be definitive      DISPO: S/P incision and drainage with graft application and wound vac application left lower extremity. No plan for further surgical intervention from a podiatric standpoint. Patient will require wound vac and PICC antibiotics at discharge. Patient ok to discharge from a podiatric standpoint pending PT/OT consult and medical clearance. Wound vac will be changed Wednesday if patient is still in house. Assessment and plan discussed with SHARYN Haley DPM   Podiatric Resident PGY1  Pager [phone] or [employer]  5/2/2023, 6:21 AM     Patient was seen and evaluated at bedside. Agree with residents assessment and treatment plan.   Han Rivera DPM
cm suspicious for abscess or infected tenosynovitis. 2.  Persistent rim-enhancing collection in the medial side of the ankle measuring 1.5 x 1.0 x 3.0 cm suspicious for abscess. 3.  Persistent prominent tenosynovial fluid distention in the intrinsic/plantar portion of the flexor digitorum longus sheath without convincing enhancement. Tenosynovitis with indeterminate sterility. 4.  Extensive marrow signal abnormality with morphology suggesting widespread bone infarcts. Given the degree of signal changes superimposed osteomyelitis would be difficult to exclude. Wound Vac application: At this time a piece of non-adherent dressing was placed over the incision site. Next, a [employer]  Wound Vac was placed over the patient's incision site using the manufacturers instructions. Tegaderm was used to cover and protect the surrounding soft tissues from maceration. The white foam was cut to size and placed over the incision site. The black foam was cut to size and placed over the incision siteThis was then covered with clear tegaderm. Next, the wound vac was connected to the suction device at a rate of 125mmHg with no leaks noted and great negative pressure noted. The patient tolerated the procedure well.            ASSESSMENT/PLAN  -S/p  Incision and drainage with application of graft jacket and application of wound Vac LLE  (5/1/23)  -Multiple Full-thickness ulcerations x2, left ankle-Valiente stage III  -Full-thickness ulceration, right foot-Valienet stage II  -Partial-thickness ulcerations, right foot  -Charcot neuroarthropathy    -Patient examined and evaluated at bedside   -VSS, no leukocytosis noted (WBC 4.9)  -CRP 60.2, ESR 56.   -Prealbumin 10.2, oral wound healing supplement ordered.   -HbA1c 8.3  -Blood cultures 1 and 2: NG  -Wound culture: Strep agalactiae (Beta Strep Group B)  -Imaging reviewed with impressions noted above  -One time dose of Toradol given prior to dressing changes to make process more

## 2023-05-04 NOTE — ADT AUTH CERT
[facility] - Care Day 22 (5/3/2023) by Silvestre Marquez, RN       Review Status Review Entered   Completed 5/3/2023 1606       Created By   Joshua Viveros RN      Criteria Review      Care Day: 22 Care Date: 5/3/2023 Level of Care: Inpatient Floor    Guideline Day 3    Level Of Care    ( ) * Activity level acceptable    Clinical Status    (X) * Temperature status acceptable    5/3/2023 4:06 PM EDT by Joshua Adams      T 99 (37.2)    ( ) * No infection, or status acceptable    5/3/2023 4:06 PM EDT by Joshua Adams      wbc 4.9    (X) * Pain and nausea absent or adequately managed    ( ) * Wound and ulcer problems absent or status acceptable    5/3/2023 4:06 PM EDT by Deborah Chairez      -S/p  Incision and drainage with application of graft jacket and application of wound Vac LLE  (5/1/23)    (X) * Burn injury absent or acceptable for next level of care    5/3/2023 4:06 PM EDT by Deborah Chairez      absent    (X) * Compartment syndrome absent    5/3/2023 4:06 PM EDT by Deborah Chairez      absent    (X) * Skin disease absent or acceptable for next level of care    5/3/2023 4:06 PM EDT by Deborah Chairez      absent    ( ) * General Discharge Criteria met    Interventions    (X) * Intake acceptable    5/3/2023 4:06 PM EDT by Joshua Adams      Regular; 4 carb choices (60 gm/meal)    ( ) * No inpatient interventions needed    5/3/2023 4:06 PM EDT by Joshua Adams      toradol inj 15 mg once iv  lasix 40 mg daily po       Definitions for Care Day 22    Pain and nausea absent or adequately managed    (X) Pain and nausea absent or adequately managed, as indicated by  1 or more  of the following     (1) (2) (3) (4) (5):       (X) No pain or nausea       * Milestone   Additional Notes   DATE: 5/3/2023      RELEVANT BASELINES: (lab values, vitals, o2 amount/delivery, etc.)   on RA             PERTINENT UPDATES:   -Patient is S/P I&D with application of

## 2023-05-09 ENCOUNTER — HOSPITAL ENCOUNTER (OUTPATIENT)
Dept: WOUND CARE | Age: 63
Discharge: HOME | End: 2023-05-09

## 2023-05-14 PROBLEM — R79.89 ELEVATED TROPONIN: Status: RESOLVED | Noted: 2023-04-14 | Resolved: 2023-05-14

## 2023-05-14 PROBLEM — R77.8 ELEVATED TROPONIN: Status: RESOLVED | Noted: 2023-04-14 | Resolved: 2023-05-14

## 2023-05-16 ENCOUNTER — HOSPITAL ENCOUNTER (OUTPATIENT)
Dept: WOUND CARE | Age: 63
Discharge: HOME | End: 2023-05-16
Payer: COMMERCIAL

## 2023-05-16 VITALS
SYSTOLIC BLOOD PRESSURE: 122 MMHG | TEMPERATURE: 97.8 F | HEART RATE: 91 BPM | DIASTOLIC BLOOD PRESSURE: 76 MMHG | RESPIRATION RATE: 18 BRPM

## 2023-05-16 PROCEDURE — 99214 OFFICE O/P EST MOD 30 MIN: CPT

## 2023-05-16 RX ORDER — DOXYCYCLINE HYCLATE 100 MG/1
100 CAPSULE ORAL 2 TIMES DAILY
COMMUNITY

## 2023-05-16 NOTE — DISCHARGE INSTRUCTIONS
215 [facility] Physician Orders and Discharge Instructions  [id number] [address] [facility]  Telephone: [phone]    NAME:  Leonor Fang  YOB: 1960  MEDICAL RECORD NUMBER:  [id number]  DATE: 2023    Return Appointment:  Return Appointment: With Dr Marcy Lucero  in  1 [facility]). Schedule weekly for the rest of the month? Yes    Future Appointments   Date Time Provider Ruddy Chong   2023  2:00 PM Nisha Gutierres MD [address]: [facility]     Change dressing?: Yes    Wound Cleansin.9% normal saline    Donna Wound Topical Treatments: No-Sting barrier film       Dressings:           Wound Location: Right Foot     Primary Dressing to wound bed: Pharmaceutical medication: Santyl, Solution: Saline moistened gauze      Cover and Secure with:  4X4 gauze pad and Bulky roll gauze     Change dressing: Daily       [x]Negative Pressure:           Wound Location: Left Foot    Use No-Sting Barrier film to donna wound with each dressing change (DO NOT USE if Dermatac Drape from [employer] is used). Pressure @ 125 mmHg continuous using black foam.       Change dressing: Monday/Wednesday/Friday  Make sure that tubing is not against the skin by using gauze and/or Kerlix. Ace wrap from fore foot to just below the knee on Left Lower Leg    [x]Wound clinic ONLY: Apply saline wet to dry dressing until SNF can place wound vac on. Pressure Relief:  When sitting, shift position or do seat lifts every 15 minutes. Turn every 2 hours when in bed. Avoid position directing pressure on wound site. Limit side lying to 30 degree tilt. Limit HOB elevation to 30 degrees. Specialty equipment needed:  []Wheelchair cushion [] Specialty Bed/Mattress  []To be ordered by: []Skilled [address] []Wound [place] []Other:     Edema Control:  Apply: Peg Ja on the Right; (Single medium).

## 2023-05-16 NOTE — PROGRESS NOTES
[address]   Progress Note and Procedure Note      Leeann Osman  MEDICAL RECORD NUMBER:  [id number]  AGE: 65 y.o. GENDER: female  : 1960  EPISODE DATE:  2023    Subjective:     Chief Complaint   Patient presents with    Wound Check     Left foot         HISTORY of PRESENT ILLNESS HPI     Leeann Osman is a 65 y.o. female who presents today for wound/ulcer evaluation. History of Wound Context: patient presents today for follow up after I&D of the left foot and ankle. Patient has been having 3 times a week wound VAC changes on the left and daily dressing changes on the right. She has been on IV antibiotic therapy per the recommendations of infectious disease. Wound/Ulcer Pain Timing/Severity: none  Quality of pain: N/A  Severity:  0 / 10   Modifying Factors: None  Associated Signs/Symptoms: none    Ulcer Identification:  Ulcer Type: diabetic    Contributing Factors: diabetes and poor glucose control    Acute Wound: N/A    PAST MEDICAL HISTORY        Diagnosis Date    Arthritis     Asthma     exercise/cold induced    Blindness, legal     CVA (cerebral vascular accident) ([address] 2015    Depression     Diabetes mellitus ([address]     ION (ischemic optic neuropathy)     developed post-op r/t hypotension    Osteomyelitis ([address]     Left foot; finished Vanco. end of .     TIA (transient ischemic attack)        PAST SURGICAL HISTORY    Past Surgical History:   Procedure Laterality Date    BACK SURGERY       SECTION      CHOLECYSTECTOMY      ELBOW SURGERY Left     FOOT DEBRIDEMENT Left 2023    LEFT FOOT INCISION AND DEBRIDEMENT/ POSSIBLE GRAFT APPLICATION/ POSSIBLE WOUND VACUUM APPLICATION performed by Rohith Anguiano DPM at [address] [place] [address] Left 2014    INCISION AND DRAINAGE MID FOOT LEFT, ENDOSCOPIC GASTROC    FOOT SURGERY Left 2014    LEFT FOOT EXTERNAL FIXATOR REMOVAL        HYSTERECTOMY (CERVIX STATUS UNKNOWN)      ORTHOPEDIC

## 2023-05-23 ENCOUNTER — HOSPITAL ENCOUNTER (OUTPATIENT)
Dept: WOUND CARE | Age: 63
Discharge: HOME | End: 2023-05-23
Payer: COMMERCIAL

## 2023-05-23 ENCOUNTER — TELEPHONE (OUTPATIENT)
Dept: WOUND CARE | Age: 63
End: 2023-05-23

## 2023-05-23 VITALS — TEMPERATURE: 96.8 F | SYSTOLIC BLOOD PRESSURE: 117 MMHG | DIASTOLIC BLOOD PRESSURE: 72 MMHG | HEART RATE: 69 BPM

## 2023-05-23 DIAGNOSIS — E11.621 DIABETIC ULCER OF TOE OF RIGHT FOOT ASSOCIATED WITH TYPE 2 DIABETES MELLITUS, WITH FAT LAYER EXPOSED (HCC): ICD-10-CM

## 2023-05-23 DIAGNOSIS — L97.512 DIABETIC ULCER OF TOE OF RIGHT FOOT ASSOCIATED WITH TYPE 2 DIABETES MELLITUS, WITH FAT LAYER EXPOSED (HCC): ICD-10-CM

## 2023-05-23 DIAGNOSIS — L08.9 DIABETIC INFECTION OF RIGHT FOOT (HCC): Primary | ICD-10-CM

## 2023-05-23 DIAGNOSIS — E11.628 DIABETIC INFECTION OF RIGHT FOOT (HCC): Primary | ICD-10-CM

## 2023-05-23 PROCEDURE — 11042 DBRDMT SUBQ TIS 1ST 20SQCM/<: CPT

## 2023-05-23 PROCEDURE — 6370000000 HC RX 637 (ALT 250 FOR IP): Performed by: PODIATRIST

## 2023-05-23 RX ORDER — LIDOCAINE 50 MG/G
OINTMENT TOPICAL ONCE
OUTPATIENT
Start: 2023-05-23 | End: 2023-05-23

## 2023-05-23 RX ORDER — CLOBETASOL PROPIONATE 0.5 MG/G
OINTMENT TOPICAL ONCE
OUTPATIENT
Start: 2023-05-23 | End: 2023-05-23

## 2023-05-23 RX ORDER — BACITRACIN ZINC AND POLYMYXIN B SULFATE 500; 1000 [USP'U]/G; [USP'U]/G
OINTMENT TOPICAL ONCE
OUTPATIENT
Start: 2023-05-23 | End: 2023-05-23

## 2023-05-23 RX ORDER — LIDOCAINE HYDROCHLORIDE 20 MG/ML
JELLY TOPICAL ONCE
OUTPATIENT
Start: 2023-05-23 | End: 2023-05-23

## 2023-05-23 RX ORDER — LIDOCAINE 40 MG/G
CREAM TOPICAL ONCE
OUTPATIENT
Start: 2023-05-23 | End: 2023-05-23

## 2023-05-23 RX ORDER — LIDOCAINE HYDROCHLORIDE 40 MG/ML
SOLUTION TOPICAL ONCE
OUTPATIENT
Start: 2023-05-23 | End: 2023-05-23

## 2023-05-23 RX ORDER — GINSENG 100 MG
CAPSULE ORAL ONCE
OUTPATIENT
Start: 2023-05-23 | End: 2023-05-23

## 2023-05-23 RX ORDER — LIDOCAINE HYDROCHLORIDE 40 MG/ML
SOLUTION TOPICAL ONCE
Status: COMPLETED | OUTPATIENT
Start: 2023-05-23 | End: 2023-05-23

## 2023-05-23 RX ORDER — BACITRACIN, NEOMYCIN, POLYMYXIN B 400; 3.5; 5 [USP'U]/G; MG/G; [USP'U]/G
OINTMENT TOPICAL ONCE
OUTPATIENT
Start: 2023-05-23 | End: 2023-05-23

## 2023-05-23 RX ORDER — BETAMETHASONE DIPROPIONATE 0.05 %
OINTMENT (GRAM) TOPICAL ONCE
OUTPATIENT
Start: 2023-05-23 | End: 2023-05-23

## 2023-05-23 RX ORDER — GENTAMICIN SULFATE 1 MG/G
OINTMENT TOPICAL ONCE
OUTPATIENT
Start: 2023-05-23 | End: 2023-05-23

## 2023-05-23 RX ADMIN — LIDOCAINE HYDROCHLORIDE: 40 SOLUTION TOPICAL at 15:09

## 2023-05-23 NOTE — DISCHARGE INSTRUCTIONS
215 [facility] Physician Orders and Discharge Instructions  [address] [address] [address] [address] [facility]  Telephone: [phone]  NAME:  Liudmila De Paz  YOB: 1960  MEDICAL RECORD NUMBER:  [id number]  DATE: 5/23/23     Return Appointment:  Return Appointment: With Kenya Toledo DPM  in  1 [facility])  [] Return Appointment for a Wound Assessment with the nurse on:     Future Appointments   Date Time Provider Ruddy Chong   5/30/2023  1:45 PM Sydney Mixon DPM [facility]   6/6/2023 10:30 AM Saniya Steve MD [facility]   6/6/2023  1:45 PM Sydney Mixon  [facility] [address]: We will assist in finding a home care company  Medically necessary services: [x] Skilled Nursing [] PT (Eval & Treat) [] OT (Eval & Treat) [] Social Work [] Dietician  [] Other:    Wound care instructions: If you smoke we ask that you refrain from smoking. Smoking inhibits wounds from healing. When taking antibiotics take the entire prescription as ordered. Do not stop taking until medication is all gone unless otherwise instructed. Exercise as tolerated. Keep weight off wounds and reposition every 2 hours if applicable. Do not get wounds wet in the bath or shower unless otherwise instructed by your physician. If your wound is on your foot or leg, you may purchase a cast bag. Please ask at the pharmacy. Wash hands with soap and water prior to and after every dressing change. [x]Wash wounds with: 0.9% normal saline  [x]Donna wound Topical Treatments: Do not apply lotions, creams, or ointments to the skin around the wound bed unless directed as followed:   Apply around the wound: No-Sting barrier film       [x]Wound Location: Left Heel   Apply Primary Dressing to wound: Paint with Betadine  Secondary Dressing: 4X4 gauze pad and Conforming roll gauze   Avoid contact of tape with skin if possible.   When to change Dressing:

## 2023-05-23 NOTE — TELEPHONE ENCOUNTER
Returned call and spoke with Rikki Perry [phone] at [address] in regards to coordinating care. Stated that they are having trouble finding a home care company that will take patient. Made call out to Michelle Castro, home care liaison to help assist in patient coordination. Will f/u on Thursday when we return to the [facility].

## 2023-05-23 NOTE — PROGRESS NOTES
[address]   Progress Note and Procedure Note      Glenora Mcardle  MEDICAL RECORD NUMBER:  [id number]  AGE: 65 y.o. GENDER: female  : 1960  EPISODE DATE:  2023    Subjective:     Chief Complaint   Patient presents with    Wound Check     Bilateral feet           HISTORY of PRESENT ILLNESS HPI     Glenora Mcardle is a 65 y.o. female who presents today for wound/ulcer evaluation. History of Wound Context: patient presents today for follow up after I&D of the left foot and ankle. Patient has been having 3 times a week wound VAC changes on the left and daily dressing changes on the right. She has been on IV antibiotic therapy per the recommendations of infectious disease. Patient relates she finished her IV antibiotics yesterday. She is going to be returning home tomorrow. Patient presents today with her  who relates they have purchased a lift apparatus to help her transfer  Wound/Ulcer Pain Timing/Severity: none  Quality of pain: N/A  Severity:  0 / 10   Modifying Factors: None  Associated Signs/Symptoms: none    Ulcer Identification:  Ulcer Type: diabetic    Contributing Factors: diabetes and poor glucose control    Acute Wound: N/A    PAST MEDICAL HISTORY        Diagnosis Date    Arthritis     Asthma     exercise/cold induced    Blindness, legal     CVA (cerebral vascular accident) [address] 2015    Depression     Diabetes mellitus ([address]     ION (ischemic optic neuropathy)     developed post-op r/t hypotension    Osteomyelitis ([address]     Left foot; finished Vanco. end of .     TIA (transient ischemic attack)        PAST SURGICAL HISTORY    Past Surgical History:   Procedure Laterality Date    BACK SURGERY       SECTION      CHOLECYSTECTOMY      ELBOW SURGERY Left     FOOT DEBRIDEMENT Left 2023    LEFT FOOT INCISION AND DEBRIDEMENT/ POSSIBLE GRAFT APPLICATION/ POSSIBLE WOUND VACUUM APPLICATION performed by Isamar Álvarez DPM at [address]

## 2023-05-30 ENCOUNTER — TELEPHONE (OUTPATIENT)
Dept: WOUND CARE | Age: 63
End: 2023-05-30

## 2023-05-30 ENCOUNTER — HOSPITAL ENCOUNTER (OUTPATIENT)
Dept: WOUND CARE | Age: 63
Discharge: HOME | End: 2023-05-30
Payer: COMMERCIAL

## 2023-05-30 VITALS — HEART RATE: 89 BPM | DIASTOLIC BLOOD PRESSURE: 75 MMHG | TEMPERATURE: 97.2 F | SYSTOLIC BLOOD PRESSURE: 114 MMHG

## 2023-05-30 DIAGNOSIS — E11.628 DIABETIC INFECTION OF RIGHT FOOT (HCC): Primary | ICD-10-CM

## 2023-05-30 DIAGNOSIS — L97.512 DIABETIC ULCER OF TOE OF RIGHT FOOT ASSOCIATED WITH TYPE 2 DIABETES MELLITUS, WITH FAT LAYER EXPOSED (HCC): ICD-10-CM

## 2023-05-30 DIAGNOSIS — L08.9 DIABETIC INFECTION OF RIGHT FOOT (HCC): Primary | ICD-10-CM

## 2023-05-30 DIAGNOSIS — E11.621 DIABETIC ULCER OF TOE OF RIGHT FOOT ASSOCIATED WITH TYPE 2 DIABETES MELLITUS, WITH FAT LAYER EXPOSED (HCC): ICD-10-CM

## 2023-05-30 PROCEDURE — 6370000000 HC RX 637 (ALT 250 FOR IP): Performed by: PODIATRIST

## 2023-05-30 PROCEDURE — 11042 DBRDMT SUBQ TIS 1ST 20SQCM/<: CPT

## 2023-05-30 RX ORDER — LIDOCAINE 50 MG/G
OINTMENT TOPICAL ONCE
OUTPATIENT
Start: 2023-05-30 | End: 2023-05-30

## 2023-05-30 RX ORDER — LIDOCAINE HYDROCHLORIDE 20 MG/ML
JELLY TOPICAL ONCE
OUTPATIENT
Start: 2023-05-30 | End: 2023-05-30

## 2023-05-30 RX ORDER — BACITRACIN ZINC AND POLYMYXIN B SULFATE 500; 1000 [USP'U]/G; [USP'U]/G
OINTMENT TOPICAL ONCE
OUTPATIENT
Start: 2023-05-30 | End: 2023-05-30

## 2023-05-30 RX ORDER — BACITRACIN, NEOMYCIN, POLYMYXIN B 400; 3.5; 5 [USP'U]/G; MG/G; [USP'U]/G
OINTMENT TOPICAL ONCE
OUTPATIENT
Start: 2023-05-30 | End: 2023-05-30

## 2023-05-30 RX ORDER — BETAMETHASONE DIPROPIONATE 0.05 %
OINTMENT (GRAM) TOPICAL ONCE
OUTPATIENT
Start: 2023-05-30 | End: 2023-05-30

## 2023-05-30 RX ORDER — INSULIN LISPRO 100 [IU]/ML
INJECTION, SOLUTION INTRAVENOUS; SUBCUTANEOUS
COMMUNITY
Start: 2023-05-26

## 2023-05-30 RX ORDER — LIDOCAINE 40 MG/G
CREAM TOPICAL ONCE
OUTPATIENT
Start: 2023-05-30 | End: 2023-05-30

## 2023-05-30 RX ORDER — CLOBETASOL PROPIONATE 0.5 MG/G
OINTMENT TOPICAL ONCE
OUTPATIENT
Start: 2023-05-30 | End: 2023-05-30

## 2023-05-30 RX ORDER — GENTAMICIN SULFATE 1 MG/G
OINTMENT TOPICAL ONCE
OUTPATIENT
Start: 2023-05-30 | End: 2023-05-30

## 2023-05-30 RX ORDER — LIDOCAINE HYDROCHLORIDE 40 MG/ML
SOLUTION TOPICAL ONCE
OUTPATIENT
Start: 2023-05-30 | End: 2023-05-30

## 2023-05-30 RX ORDER — LIDOCAINE HYDROCHLORIDE 40 MG/ML
SOLUTION TOPICAL ONCE
Status: COMPLETED | OUTPATIENT
Start: 2023-05-30 | End: 2023-05-30

## 2023-05-30 RX ORDER — GINSENG 100 MG
CAPSULE ORAL ONCE
OUTPATIENT
Start: 2023-05-30 | End: 2023-05-30

## 2023-05-30 RX ADMIN — LIDOCAINE HYDROCHLORIDE: 40 SOLUTION TOPICAL at 16:11

## 2023-05-30 NOTE — PROGRESS NOTES
[address]   Progress Note and Procedure Note      Moo Zhou  MEDICAL RECORD NUMBER:  [id number]  AGE: 65 y.o. GENDER: female  : 1960  EPISODE DATE:  2023    Subjective:     Chief Complaint   Patient presents with    Wound Check     Left lower leg and foot         HISTORY of PRESENT ILLNESS HPI     Moo Zhou is a 65 y.o. female who presents today for wound/ulcer evaluation. History of Wound Context: patient presents today for follow up after I&D of the left foot and ankle. Patient has been having 3 times a week wound VAC changes on the left and daily dressing changes on the right. She has completed IV antibiotic therapy per the recommendations of infectious disease. Patient presents today with her  who relates they have purchased a lift apparatus to help her transfer  Wound/Ulcer Pain Timing/Severity: none  Quality of pain: N/A  Severity:  0 / 10   Modifying Factors: None  Associated Signs/Symptoms: none    Ulcer Identification:  Ulcer Type: diabetic    Contributing Factors: diabetes and poor glucose control    Acute Wound: N/A    PAST MEDICAL HISTORY        Diagnosis Date    Arthritis     Asthma     exercise/cold induced    Blindness, legal     CVA (cerebral vascular accident) [address] 2015    Depression     Diabetes mellitus ([address]     ION (ischemic optic neuropathy)     developed post-op r/t hypotension    Osteomyelitis ([address]     Left foot; finished Vanco. end of .     TIA (transient ischemic attack)        PAST SURGICAL HISTORY    Past Surgical History:   Procedure Laterality Date    BACK SURGERY       SECTION      CHOLECYSTECTOMY      ELBOW SURGERY Left     FOOT DEBRIDEMENT Left 2023    LEFT FOOT INCISION AND DEBRIDEMENT/ POSSIBLE GRAFT APPLICATION/ POSSIBLE WOUND VACUUM APPLICATION performed by Emerson Malin DPM at [facility] [address] Left 2014    INCISION AND DRAINAGE MID FOOT LEFT, ENDOSCOPIC GASTROC

## 2023-05-30 NOTE — DISCHARGE INSTRUCTIONS
215 [facility] Physician Orders and Discharge Instructions  [address] [address] [facility]  Telephone: [phone]  NAME:  Angel Multani  YOB: 1960  MEDICAL RECORD NUMBER:  [id number]  DATE: 5/30/23     Return Appointment:  Return Appointment: With Chandrika Fritz DPM  in  1 [facility])  [] Return Appointment for a Wound Assessment with the nurse on:     Future Appointments   Date Time Provider Ruddy Chong   6/6/2023 10:30 AM Taz George MD [facility]   6/6/2023  1:45 PM Catherine Michel DPM [address]: [facility] [phone]  Fax: [phone]    Medically necessary services:        [x] Skilled Nursing       [] PT (Eval & Treat)   [] OT (Eval & Treat)       [] Social Work           [] Dietician  [] Other:     Wound care instructions: If you smoke we ask that you refrain from smoking. Smoking inhibits wounds from healing. When taking antibiotics take the entire prescription as ordered. Do not stop taking until medication is all gone unless otherwise instructed. Exercise as tolerated. Keep weight off wounds and reposition every 2 hours if applicable. Do not get wounds wet in the bath or shower unless otherwise instructed by your physician. If your wound is on your foot or leg, you may purchase a cast bag. Please ask at the pharmacy. Wash hands with soap and water prior to and after every dressing change. [x]Wash wounds with: 0.9% normal saline  [x]Donna wound Topical Treatments: Do not apply lotions, creams, or ointments to the skin around the wound bed unless directed as followed:   Apply around the wound: No-Sting barrier film        [x]Wound Location: Left Heel            Apply Primary Dressing to wound: Paint with Betadine  Secondary Dressing: 4X4 gauze pad and Conforming roll gauze              Avoid contact of tape with skin if possible.   When to change

## 2023-05-30 NOTE — TELEPHONE ENCOUNTER
Patient being discharged from [facility] and received referral for SNF.   Please fax orders [phone]

## 2023-06-06 ENCOUNTER — HOSPITAL ENCOUNTER (OUTPATIENT)
Dept: WOUND CARE | Age: 63
Discharge: HOME | End: 2023-06-06
Payer: COMMERCIAL

## 2023-06-06 ENCOUNTER — OFFICE VISIT (OUTPATIENT)
Dept: CARDIOLOGY CLINIC | Age: 63
End: 2023-06-06
Payer: COMMERCIAL

## 2023-06-06 VITALS
HEART RATE: 88 BPM | DIASTOLIC BLOOD PRESSURE: 67 MMHG | RESPIRATION RATE: 18 BRPM | TEMPERATURE: 97.1 F | SYSTOLIC BLOOD PRESSURE: 106 MMHG

## 2023-06-06 VITALS
BODY MASS INDEX: 40.12 KG/M2 | DIASTOLIC BLOOD PRESSURE: 70 MMHG | HEART RATE: 60 BPM | SYSTOLIC BLOOD PRESSURE: 120 MMHG | HEIGHT: 64 IN | WEIGHT: 235 LBS

## 2023-06-06 DIAGNOSIS — L08.9 DIABETIC INFECTION OF RIGHT FOOT (HCC): Primary | ICD-10-CM

## 2023-06-06 DIAGNOSIS — I25.5 ISCHEMIC CARDIOMYOPATHY: ICD-10-CM

## 2023-06-06 DIAGNOSIS — E11.628 DIABETIC INFECTION OF RIGHT FOOT (HCC): Primary | ICD-10-CM

## 2023-06-06 DIAGNOSIS — I25.10 CORONARY ARTERY DISEASE INVOLVING NATIVE CORONARY ARTERY OF NATIVE HEART WITHOUT ANGINA PECTORIS: Primary | ICD-10-CM

## 2023-06-06 DIAGNOSIS — E11.621 DIABETIC ULCER OF TOE OF RIGHT FOOT ASSOCIATED WITH TYPE 2 DIABETES MELLITUS, WITH FAT LAYER EXPOSED (HCC): ICD-10-CM

## 2023-06-06 DIAGNOSIS — I48.91 ATRIAL FIBRILLATION WITH RVR (HCC): ICD-10-CM

## 2023-06-06 DIAGNOSIS — L97.512 DIABETIC ULCER OF TOE OF RIGHT FOOT ASSOCIATED WITH TYPE 2 DIABETES MELLITUS, WITH FAT LAYER EXPOSED (HCC): ICD-10-CM

## 2023-06-06 PROCEDURE — 99214 OFFICE O/P EST MOD 30 MIN: CPT | Performed by: INTERNAL MEDICINE

## 2023-06-06 PROCEDURE — 6370000000 HC RX 637 (ALT 250 FOR IP): Performed by: PODIATRIST

## 2023-06-06 PROCEDURE — 11042 DBRDMT SUBQ TIS 1ST 20SQCM/<: CPT

## 2023-06-06 RX ORDER — LIDOCAINE 50 MG/G
OINTMENT TOPICAL ONCE
OUTPATIENT
Start: 2023-06-06 | End: 2023-06-06

## 2023-06-06 RX ORDER — IBUPROFEN 200 MG
TABLET ORAL ONCE
OUTPATIENT
Start: 2023-06-06 | End: 2023-06-06

## 2023-06-06 RX ORDER — LIDOCAINE HYDROCHLORIDE 40 MG/ML
SOLUTION TOPICAL ONCE
OUTPATIENT
Start: 2023-06-06 | End: 2023-06-06

## 2023-06-06 RX ORDER — GINSENG 100 MG
CAPSULE ORAL ONCE
OUTPATIENT
Start: 2023-06-06 | End: 2023-06-06

## 2023-06-06 RX ORDER — LIDOCAINE HYDROCHLORIDE 20 MG/ML
JELLY TOPICAL ONCE
OUTPATIENT
Start: 2023-06-06 | End: 2023-06-06

## 2023-06-06 RX ORDER — LIDOCAINE 40 MG/G
CREAM TOPICAL ONCE
OUTPATIENT
Start: 2023-06-06 | End: 2023-06-06

## 2023-06-06 RX ORDER — CLOPIDOGREL BISULFATE 75 MG/1
75 TABLET ORAL DAILY
Qty: 90 TABLET | Refills: 3 | Status: SHIPPED | OUTPATIENT
Start: 2023-06-06 | End: 2023-06-06

## 2023-06-06 RX ORDER — BACITRACIN ZINC AND POLYMYXIN B SULFATE 500; 1000 [USP'U]/G; [USP'U]/G
OINTMENT TOPICAL ONCE
OUTPATIENT
Start: 2023-06-06 | End: 2023-06-06

## 2023-06-06 RX ORDER — LIDOCAINE HYDROCHLORIDE 40 MG/ML
SOLUTION TOPICAL ONCE
Status: COMPLETED | OUTPATIENT
Start: 2023-06-06 | End: 2023-06-06

## 2023-06-06 RX ORDER — GENTAMICIN SULFATE 1 MG/G
OINTMENT TOPICAL ONCE
OUTPATIENT
Start: 2023-06-06 | End: 2023-06-06

## 2023-06-06 RX ORDER — CLOBETASOL PROPIONATE 0.5 MG/G
OINTMENT TOPICAL ONCE
OUTPATIENT
Start: 2023-06-06 | End: 2023-06-06

## 2023-06-06 RX ORDER — BETAMETHASONE DIPROPIONATE 0.05 %
OINTMENT (GRAM) TOPICAL ONCE
OUTPATIENT
Start: 2023-06-06 | End: 2023-06-06

## 2023-06-06 RX ADMIN — LIDOCAINE HYDROCHLORIDE: 40 SOLUTION TOPICAL at 14:52

## 2023-06-06 NOTE — PROGRESS NOTES
[facility], [place]   Dr Jj Alberts. Lorenzo Reid MD, [address] [facility]  2  Outpatient Follow Up Note    6/6/2023,10:50 AM  Subjective:   CHIEF COMPLAINT / HPI:  Follow Up secondary to CAD CAD and previous coronary stenting     Luis Manuel Martell is 65 y.o. female who presents today for a routine follow up. This patient has severe peripheral vascular disease. Right-sided dropfoot from previous CVA. Left foot hallux infection and she has some osteomyelitis and is being treated by podiatry and is still under treatment therapy. She had acute coronary disease and did have coronary angiography and stenting April 26, 2023. Three-vessel disease. Surgery was asked to see her for surgery consideration and declined to operate \"do what she can percutaneously\". We did proceed to treat her with stenting as noted below. She has done great she states that she is feels much better overall. She is adamant that she will not take a statin and does not want to take any kind of medication for her cholesterol. She had a history of atrial fibrillation and declined to take amiodarone. We had her on Xarelto and she declined to take any anticoagulation. \"I will not take Lipitor\". CAD with significant extensive coronary intervention April 26, 2026. Hyperlipidemia and she declined to take any medications. Paroxysmal atrial fibrillation and she will not take anticoagulation. Diabetes mellitus  Significant foot infection and osteomyelitis and is treated with podiatry Dr. Gianfranco Michelle with revisionary surgery. CAD and stents 4/26/23  CMP   A fib              Selective Coronary Angiography 4/26/23  Cardiac Catheterization for Coronary Anatomy  Left Heart Catheterization  PTCA stent to the circumflex Drug eluting x3. PTCA stent to the obtuse marginal branch APURVA x 2. PTCA stent to the LAD mid and mid distal APURVA x 2. Left Ventriculogram not done on this particular occasion.   Radial artery access assisted by ultrasound  Arterial

## 2023-06-06 NOTE — DISCHARGE INSTRUCTIONS
215 [facility] Physician Orders and Discharge Instructions  [address] [address] [facility]. 103  Telephone: [phone]  NAME:  Oliver Rogers  YOB: 1960  MEDICAL RECORD NUMBER:  [id number]  DATE: 6/6/23     Return Appointment:  Return Appointment: With Sally Whitehead DPM  in  2 [facility])  [] Return Appointment for a Wound Assessment with the nurse on:     Future Appointments   Date Time Provider Ruddy Chong   6/20/2023  1:45 PM Esteban Arrington DPM [facility] WOUND [facility] HOD   10/3/2023 10:30 AM Ray Huizar MD [address] [facility] [phone]  Fax: [phone]     Medically necessary services:        [x] Skilled Nursing       [] PT (Eval & Treat)   [] OT (Eval & Treat)       [] Social Work           [] Dietician  [] Other:     Wound care instructions: If you smoke we ask that you refrain from smoking. Smoking inhibits wounds from healing. When taking antibiotics take the entire prescription as ordered. Do not stop taking until medication is all gone unless otherwise instructed. Exercise as tolerated. Keep weight off wounds and reposition every 2 hours if applicable. Do not get wounds wet in the bath or shower unless otherwise instructed by your physician. If your wound is on your foot or leg, you may purchase a cast bag. Please ask at the pharmacy. Wash hands with soap and water prior to and after every dressing change. [x]Wash wounds with: 0.9% normal saline  [x]Donna wound Topical Treatments: Do not apply lotions, creams, or ointments to the skin around the wound bed unless directed as followed:   Apply around the wound: No-Sting barrier film           [x]Negative Pressure:           Wound Location: Left Foot **NEW ORDERS - include heel**  [x]Wound clinic to apply saline wet to dry dressing until home care to place wound vac on.   Use No-Sting Barrier film to donna wound

## 2023-06-20 ENCOUNTER — HOSPITAL ENCOUNTER (OUTPATIENT)
Dept: WOUND CARE | Age: 63
Discharge: HOME | End: 2023-06-20
Payer: COMMERCIAL

## 2023-06-20 VITALS
HEART RATE: 89 BPM | RESPIRATION RATE: 16 BRPM | DIASTOLIC BLOOD PRESSURE: 86 MMHG | TEMPERATURE: 97.3 F | SYSTOLIC BLOOD PRESSURE: 130 MMHG

## 2023-06-20 DIAGNOSIS — L08.9 DIABETIC FOOT INFECTION (HCC): ICD-10-CM

## 2023-06-20 DIAGNOSIS — E11.628 DIABETIC INFECTION OF RIGHT FOOT (HCC): ICD-10-CM

## 2023-06-20 DIAGNOSIS — L97.423 ULCER OF LEFT HEEL, WITH NECROSIS OF MUSCLE (HCC): Primary | ICD-10-CM

## 2023-06-20 DIAGNOSIS — L97.512 DIABETIC ULCER OF TOE OF RIGHT FOOT ASSOCIATED WITH TYPE 2 DIABETES MELLITUS, WITH FAT LAYER EXPOSED (HCC): ICD-10-CM

## 2023-06-20 DIAGNOSIS — E11.628 DIABETIC FOOT INFECTION (HCC): ICD-10-CM

## 2023-06-20 DIAGNOSIS — E11.621 DIABETIC ULCER OF TOE OF RIGHT FOOT ASSOCIATED WITH TYPE 2 DIABETES MELLITUS, WITH FAT LAYER EXPOSED (HCC): ICD-10-CM

## 2023-06-20 DIAGNOSIS — I73.9 PVD (PERIPHERAL VASCULAR DISEASE) (HCC): ICD-10-CM

## 2023-06-20 DIAGNOSIS — L08.9 DIABETIC INFECTION OF RIGHT FOOT (HCC): ICD-10-CM

## 2023-06-20 DIAGNOSIS — E11.621 DIABETIC ULCER OF LEFT HEEL ASSOCIATED WITH TYPE 2 DIABETES MELLITUS, WITH FAT LAYER EXPOSED (HCC): ICD-10-CM

## 2023-06-20 DIAGNOSIS — L97.422 DIABETIC ULCER OF LEFT HEEL ASSOCIATED WITH TYPE 2 DIABETES MELLITUS, WITH FAT LAYER EXPOSED (HCC): ICD-10-CM

## 2023-06-20 PROCEDURE — 11045 DBRDMT SUBQ TISS EACH ADDL: CPT

## 2023-06-20 PROCEDURE — 11042 DBRDMT SUBQ TIS 1ST 20SQCM/<: CPT

## 2023-06-20 RX ORDER — GENTAMICIN SULFATE 1 MG/G
OINTMENT TOPICAL ONCE
OUTPATIENT
Start: 2023-06-20 | End: 2023-06-20

## 2023-06-20 RX ORDER — BETAMETHASONE DIPROPIONATE 0.05 %
OINTMENT (GRAM) TOPICAL ONCE
OUTPATIENT
Start: 2023-06-20 | End: 2023-06-20

## 2023-06-20 RX ORDER — IBUPROFEN 200 MG
TABLET ORAL ONCE
OUTPATIENT
Start: 2023-06-20 | End: 2023-06-20

## 2023-06-20 RX ORDER — LIDOCAINE 40 MG/G
CREAM TOPICAL ONCE
OUTPATIENT
Start: 2023-06-20 | End: 2023-06-20

## 2023-06-20 RX ORDER — LIDOCAINE HYDROCHLORIDE 40 MG/ML
SOLUTION TOPICAL ONCE
Status: DISCONTINUED | OUTPATIENT
Start: 2023-06-20 | End: 2023-06-21 | Stop reason: HOSPADM

## 2023-06-20 RX ORDER — BACITRACIN ZINC AND POLYMYXIN B SULFATE 500; 1000 [USP'U]/G; [USP'U]/G
OINTMENT TOPICAL ONCE
OUTPATIENT
Start: 2023-06-20 | End: 2023-06-20

## 2023-06-20 RX ORDER — LIDOCAINE HYDROCHLORIDE 20 MG/ML
JELLY TOPICAL ONCE
OUTPATIENT
Start: 2023-06-20 | End: 2023-06-20

## 2023-06-20 RX ORDER — GINSENG 100 MG
CAPSULE ORAL ONCE
OUTPATIENT
Start: 2023-06-20 | End: 2023-06-20

## 2023-06-20 RX ORDER — LIDOCAINE 50 MG/G
OINTMENT TOPICAL ONCE
OUTPATIENT
Start: 2023-06-20 | End: 2023-06-20

## 2023-06-20 RX ORDER — LIDOCAINE HYDROCHLORIDE 40 MG/ML
SOLUTION TOPICAL ONCE
OUTPATIENT
Start: 2023-06-20 | End: 2023-06-20

## 2023-06-20 RX ORDER — CLOBETASOL PROPIONATE 0.5 MG/G
OINTMENT TOPICAL ONCE
OUTPATIENT
Start: 2023-06-20 | End: 2023-06-20

## 2023-06-20 NOTE — PROGRESS NOTES
[address]   Progress Note and Procedure Note      Shawna Houston  MEDICAL RECORD NUMBER:  [id number]  AGE: 65 y.o. GENDER: female  : 1960  EPISODE DATE:  2023    Subjective:     Chief Complaint   Patient presents with    Wound Check     Left foot         HISTORY of PRESENT ILLNESS HPI     Shawna Houston is a 65 y.o. female who presents today for wound/ulcer evaluation. History of Wound Context: patient presents today for follow up after I&D of the left foot and ankle. Patient has been having 3 times a week wound VAC changes on the left and daily dressing changes on the right. She has completed IV antibiotic therapy per the recommendations of infectious disease. Patient presents today with her . Patient relates that she has not had her wound VAC on for a week as she was recently seen in the ER for a possible infection. She states she has finished her oral antibiotics. Wound/Ulcer Pain Timing/Severity: none  Quality of pain: N/A  Severity:  0 / 10   Modifying Factors: None  Associated Signs/Symptoms: none    Ulcer Identification:  Ulcer Type: diabetic    Contributing Factors: diabetes and poor glucose control    Acute Wound: N/A    PAST MEDICAL HISTORY        Diagnosis Date    Arthritis     Asthma     exercise/cold induced    Blindness, legal     CVA (cerebral vascular accident) ([facility] [address] [address] 2015    Depression     Diabetes mellitus ([address]     ION (ischemic optic neuropathy)     developed post-op r/t hypotension    Osteomyelitis ([address]     Left foot; finished Vanco. end of .     TIA (transient ischemic attack)        PAST SURGICAL HISTORY    Past Surgical History:   Procedure Laterality Date    BACK SURGERY       SECTION      CHOLECYSTECTOMY      ELBOW SURGERY Left     FOOT DEBRIDEMENT Left 2023    LEFT FOOT INCISION AND DEBRIDEMENT/ POSSIBLE GRAFT APPLICATION/ POSSIBLE WOUND VACUUM APPLICATION performed by Nidhi Gardner DPM at [address]

## 2023-06-20 NOTE — DISCHARGE INSTRUCTIONS
215 [facility] Physician Orders and Discharge Instructions  [address] [address] [facility]. [address]. [address]  Telephone: [phone]  NAME:  Gi Ibarra  YOB: 1960  MEDICAL RECORD NUMBER:  [id number]  DATE: 6/20/23     Return Appointment:  Return Appointment: With Renee Zurita DPM  in  1 [facility])  [] Return Appointment for a Wound Assessment with the nurse on:     Future Appointments   Date Time Provider Ruddy Chong   6/27/2023 12:45 PM Monica Odonnell DPM [facility] WOUND [facility] HOD   10/3/2023 10:30 AM Jewel Krishna MD [facility]     Please call [address] [place] [address] ([phone]) to schedule your test. For your convenience try to schedule your test on the same date as your next wound care appointment, unless instructed otherwise by your physician. The ordered test is with your discharge instructions. When you call press option 2 then option 1. Any testing done in the radiology department should not require you to remove any dressings in place. Please be sure to bring wound care supplies when completing any vascular test so you are able to replace dressing after testing is completed. Vascular tests ordered by Wound Care Physicians may take up to 2 hours to complete. Refrain from smoking 2 hours prior to test and please arrive 30 minutes early to complete registration. [address]: Bacilio Langley [phone]  Fax: [phone]     Medically necessary services:        [x] Skilled Nursing       [] PT (Eval & Treat)   [] OT (Eval & Treat)       [] Social Work           [] Dietician  [] Other:     Wound care instructions: If you smoke we ask that you refrain from smoking. Smoking inhibits wounds from healing. When taking antibiotics take the entire prescription as ordered. Do not stop taking until medication is all gone unless otherwise instructed. Exercise as tolerated.    Keep weight off wounds and reposition every 2

## 2023-06-27 ENCOUNTER — HOSPITAL ENCOUNTER (OUTPATIENT)
Dept: WOUND CARE | Age: 63
Discharge: HOME | End: 2023-06-27
Payer: COMMERCIAL

## 2023-06-27 VITALS
SYSTOLIC BLOOD PRESSURE: 128 MMHG | RESPIRATION RATE: 16 BRPM | HEART RATE: 81 BPM | TEMPERATURE: 96.6 F | DIASTOLIC BLOOD PRESSURE: 73 MMHG

## 2023-06-27 DIAGNOSIS — L97.512 DIABETIC ULCER OF TOE OF RIGHT FOOT ASSOCIATED WITH TYPE 2 DIABETES MELLITUS, WITH FAT LAYER EXPOSED (HCC): ICD-10-CM

## 2023-06-27 DIAGNOSIS — L08.9 DIABETIC INFECTION OF RIGHT FOOT (HCC): Primary | ICD-10-CM

## 2023-06-27 DIAGNOSIS — E11.621 DIABETIC ULCER OF TOE OF RIGHT FOOT ASSOCIATED WITH TYPE 2 DIABETES MELLITUS, WITH FAT LAYER EXPOSED (HCC): ICD-10-CM

## 2023-06-27 DIAGNOSIS — L97.423 ULCER OF LEFT HEEL, WITH NECROSIS OF MUSCLE (HCC): ICD-10-CM

## 2023-06-27 DIAGNOSIS — E11.628 DIABETIC INFECTION OF RIGHT FOOT (HCC): Primary | ICD-10-CM

## 2023-06-27 DIAGNOSIS — I70.203 ATHEROSCLEROSIS OF ARTERY OF BOTH LOWER EXTREMITIES (HCC): ICD-10-CM

## 2023-06-27 PROCEDURE — 11042 DBRDMT SUBQ TIS 1ST 20SQCM/<: CPT

## 2023-06-27 PROCEDURE — 11721 DEBRIDE NAIL 6 OR MORE: CPT

## 2023-06-27 PROCEDURE — 6370000000 HC RX 637 (ALT 250 FOR IP): Performed by: PODIATRIST

## 2023-06-27 PROCEDURE — 11045 DBRDMT SUBQ TISS EACH ADDL: CPT

## 2023-06-27 RX ORDER — LIDOCAINE HYDROCHLORIDE 40 MG/ML
SOLUTION TOPICAL ONCE
Status: COMPLETED | OUTPATIENT
Start: 2023-06-27 | End: 2023-06-27

## 2023-06-27 RX ORDER — LIDOCAINE 50 MG/G
OINTMENT TOPICAL ONCE
OUTPATIENT
Start: 2023-06-27 | End: 2023-06-27

## 2023-06-27 RX ORDER — LIDOCAINE 40 MG/G
CREAM TOPICAL ONCE
OUTPATIENT
Start: 2023-06-27 | End: 2023-06-27

## 2023-06-27 RX ORDER — BACITRACIN ZINC AND POLYMYXIN B SULFATE 500; 1000 [USP'U]/G; [USP'U]/G
OINTMENT TOPICAL ONCE
OUTPATIENT
Start: 2023-06-27 | End: 2023-06-27

## 2023-06-27 RX ORDER — ASCORBIC ACID 500 MG
1000 TABLET ORAL DAILY
COMMUNITY

## 2023-06-27 RX ORDER — GINSENG 100 MG
CAPSULE ORAL ONCE
OUTPATIENT
Start: 2023-06-27 | End: 2023-06-27

## 2023-06-27 RX ORDER — BETAMETHASONE DIPROPIONATE 0.05 %
OINTMENT (GRAM) TOPICAL ONCE
OUTPATIENT
Start: 2023-06-27 | End: 2023-06-27

## 2023-06-27 RX ORDER — LIDOCAINE HYDROCHLORIDE 20 MG/ML
JELLY TOPICAL ONCE
OUTPATIENT
Start: 2023-06-27 | End: 2023-06-27

## 2023-06-27 RX ORDER — CALCIUM CARBONATE 300MG(750)
TABLET,CHEWABLE ORAL
COMMUNITY

## 2023-06-27 RX ORDER — LIDOCAINE HYDROCHLORIDE 40 MG/ML
SOLUTION TOPICAL ONCE
OUTPATIENT
Start: 2023-06-27 | End: 2023-06-27

## 2023-06-27 RX ORDER — IBUPROFEN 200 MG
TABLET ORAL ONCE
OUTPATIENT
Start: 2023-06-27 | End: 2023-06-27

## 2023-06-27 RX ORDER — GENTAMICIN SULFATE 1 MG/G
OINTMENT TOPICAL ONCE
OUTPATIENT
Start: 2023-06-27 | End: 2023-06-27

## 2023-06-27 RX ORDER — VITAMIN B COMPLEX
1 CAPSULE ORAL DAILY
COMMUNITY

## 2023-06-27 RX ORDER — CLOBETASOL PROPIONATE 0.5 MG/G
OINTMENT TOPICAL ONCE
OUTPATIENT
Start: 2023-06-27 | End: 2023-06-27

## 2023-06-27 RX ADMIN — LIDOCAINE HYDROCHLORIDE: 40 SOLUTION TOPICAL at 13:03

## 2023-06-29 ENCOUNTER — TELEPHONE (OUTPATIENT)
Dept: WOUND CARE | Age: 63
End: 2023-06-29

## 2023-07-17 ENCOUNTER — TELEPHONE (OUTPATIENT)
Dept: WOUND CARE | Age: 63
End: 2023-07-17

## 2023-07-17 NOTE — TELEPHONE ENCOUNTER
Laser perfusion test scheduled for next Tuesday at 1000 AM. Arrival time at 9:45. No smoking 1 hour prior to testing.

## 2023-07-18 ENCOUNTER — HOSPITAL ENCOUNTER (OUTPATIENT)
Dept: WOUND CARE | Age: 63
Discharge: HOME | End: 2023-07-18
Payer: COMMERCIAL

## 2023-07-18 VITALS
DIASTOLIC BLOOD PRESSURE: 74 MMHG | SYSTOLIC BLOOD PRESSURE: 124 MMHG | HEART RATE: 92 BPM | TEMPERATURE: 97.5 F | RESPIRATION RATE: 16 BRPM

## 2023-07-18 DIAGNOSIS — E11.628 DIABETIC INFECTION OF RIGHT FOOT (HCC): Primary | ICD-10-CM

## 2023-07-18 DIAGNOSIS — L97.512 DIABETIC ULCER OF TOE OF RIGHT FOOT ASSOCIATED WITH TYPE 2 DIABETES MELLITUS, WITH FAT LAYER EXPOSED (HCC): ICD-10-CM

## 2023-07-18 DIAGNOSIS — L08.9 DIABETIC INFECTION OF RIGHT FOOT (HCC): Primary | ICD-10-CM

## 2023-07-18 DIAGNOSIS — E11.621 DIABETIC ULCER OF TOE OF RIGHT FOOT ASSOCIATED WITH TYPE 2 DIABETES MELLITUS, WITH FAT LAYER EXPOSED (HCC): ICD-10-CM

## 2023-07-18 DIAGNOSIS — L97.423 ULCER OF LEFT HEEL, WITH NECROSIS OF MUSCLE (HCC): ICD-10-CM

## 2023-07-18 PROCEDURE — 6370000000 HC RX 637 (ALT 250 FOR IP): Performed by: PODIATRIST

## 2023-07-18 PROCEDURE — 11045 DBRDMT SUBQ TISS EACH ADDL: CPT

## 2023-07-18 PROCEDURE — 11042 DBRDMT SUBQ TIS 1ST 20SQCM/<: CPT

## 2023-07-18 RX ORDER — LIDOCAINE HYDROCHLORIDE 20 MG/ML
JELLY TOPICAL ONCE
OUTPATIENT
Start: 2023-07-18 | End: 2023-07-18

## 2023-07-18 RX ORDER — GENTAMICIN SULFATE 1 MG/G
OINTMENT TOPICAL ONCE
OUTPATIENT
Start: 2023-07-18 | End: 2023-07-18

## 2023-07-18 RX ORDER — IBUPROFEN 200 MG
TABLET ORAL ONCE
OUTPATIENT
Start: 2023-07-18 | End: 2023-07-18

## 2023-07-18 RX ORDER — LIDOCAINE 50 MG/G
OINTMENT TOPICAL ONCE
OUTPATIENT
Start: 2023-07-18 | End: 2023-07-18

## 2023-07-18 RX ORDER — GINSENG 100 MG
CAPSULE ORAL ONCE
OUTPATIENT
Start: 2023-07-18 | End: 2023-07-18

## 2023-07-18 RX ORDER — LIDOCAINE HYDROCHLORIDE 40 MG/ML
SOLUTION TOPICAL ONCE
Status: COMPLETED | OUTPATIENT
Start: 2023-07-18 | End: 2023-07-18

## 2023-07-18 RX ORDER — LIDOCAINE 40 MG/G
CREAM TOPICAL ONCE
OUTPATIENT
Start: 2023-07-18 | End: 2023-07-18

## 2023-07-18 RX ORDER — LIDOCAINE HYDROCHLORIDE 40 MG/ML
SOLUTION TOPICAL ONCE
OUTPATIENT
Start: 2023-07-18 | End: 2023-07-18

## 2023-07-18 RX ORDER — CLOBETASOL PROPIONATE 0.5 MG/G
OINTMENT TOPICAL ONCE
OUTPATIENT
Start: 2023-07-18 | End: 2023-07-18

## 2023-07-18 RX ORDER — BACITRACIN ZINC AND POLYMYXIN B SULFATE 500; 1000 [USP'U]/G; [USP'U]/G
OINTMENT TOPICAL ONCE
OUTPATIENT
Start: 2023-07-18 | End: 2023-07-18

## 2023-07-18 RX ORDER — BETAMETHASONE DIPROPIONATE 0.05 %
OINTMENT (GRAM) TOPICAL ONCE
OUTPATIENT
Start: 2023-07-18 | End: 2023-07-18

## 2023-07-18 RX ADMIN — LIDOCAINE HYDROCHLORIDE: 40 SOLUTION TOPICAL at 14:53

## 2023-07-18 NOTE — PROGRESS NOTES
[address] [place] [address]   Progress Note and Procedure Note      Zena Owens  MEDICAL RECORD NUMBER:  [id number]  AGE: 65 y.o. GENDER: female  : 1960  EPISODE DATE:  2023    Subjective:     Chief Complaint   Patient presents with    Wound Check     Left lower leg         HISTORY of PRESENT ILLNESS HPI     Zena Owens is a 65 y.o. female who presents today for wound/ulcer evaluation. History of Wound Context: patient presents today for follow up after I&D of the left foot and ankle. She states she has finished her oral antibiotics. She was not able to schedule her laser skin perfusion study. Patient relates she missed her last appointment due to transportation issues. Wound/Ulcer Pain Timing/Severity: none  Quality of pain: N/A  Severity:  0 / 10   Modifying Factors: None  Associated Signs/Symptoms: none    Ulcer Identification:  Ulcer Type: diabetic    Contributing Factors: diabetes and poor glucose control    Acute Wound: N/A    PAST MEDICAL HISTORY        Diagnosis Date    Arthritis     Asthma     exercise/cold induced    Blindness, legal     CVA (cerebral vascular accident) ([address]) 2015    Depression     Diabetes mellitus ([address])     ION (ischemic optic neuropathy)     developed post-op r/t hypotension    Osteomyelitis ([address])     Left foot; finished Vanco. end of .     TIA (transient ischemic attack)        PAST SURGICAL HISTORY    Past Surgical History:   Procedure Laterality Date    BACK SURGERY       SECTION      CHOLECYSTECTOMY      ELBOW SURGERY Left     FOOT DEBRIDEMENT Left 2023    LEFT FOOT INCISION AND DEBRIDEMENT/ POSSIBLE GRAFT APPLICATION/ POSSIBLE WOUND VACUUM APPLICATION performed by Cynthia Pal DPM at   [address] 2014    INCISION AND DRAINAGE MID FOOT LEFT, ENDOSCOPIC GASTROC    FOOT SURGERY Left 2014    LEFT FOOT EXTERNAL FIXATOR REMOVAL        HYSTERECTOMY (CERVIX STATUS UNKNOWN)      ORTHOPEDIC

## 2023-07-18 NOTE — DISCHARGE INSTRUCTIONS
411 [facility] Physician Orders and Discharge Instructions  [address] [place] [address]. [place] [address]  Telephone: [phone]  NAME:  Carmen Short  YOB: 1960  MEDICAL RECORD NUMBER:  [id number]  DATE: 7/18/23     Return Appointment:  Return Appointment: With Mira Meckel, DPM  in  1-2 [facility])  [] Return Appointment for a Wound Assessment with the nurse on:     Future Appointments   Date Time Provider [address]   7/25/2023 10:00 AM SCHEDULE, [facility] VASCULAR RM 2 [facility] VAS [facility] HOD   10/3/2023 10:30 AM Inés Pratt MD [facility]     Please call [address] [place] [address] ([phone]) to schedule your test.   When to schedule your test: Same day prior to your next wound care appointment   Type of test: Vascular: Please be sure to bring wound care supplies so you are able to replace dressings after testing is completed. Vascular tests ordered by Wound Care Physicians may take up to 2 hours to complete. Refrain from smoking 2 hours prior to test and please arrive 30 minutes early to complete registration. [facility]: [address] [place] [address] Street: Phone: [phone]   Fax: [phone]      Medically necessary services for evaluation and treatment:      []Skilled Nursing (using clean technique)        []PT (Eval & Treat)    []OT (Eval & Treat)    []Social Work   []Dietician      []Other:        Wound care instructions: If you smoke we ask that you refrain from smoking. Smoking inhibits wounds from healing. When taking antibiotics take the entire prescription as ordered. Do not stop taking until medication is all gone unless otherwise instructed. Exercise as tolerated. Keep weight off wounds and reposition every 2 hours if applicable. Do not get wounds wet in the bath or shower unless otherwise instructed by your physician. If your wound is on your foot or leg, you may purchase a cast bag. Please ask at the pharmacy.   Wash hands

## 2023-07-25 ENCOUNTER — HOSPITAL ENCOUNTER (OUTPATIENT)
Dept: WOUND CARE | Age: 63
Discharge: HOME | End: 2023-07-25
Payer: COMMERCIAL

## 2023-07-25 ENCOUNTER — HOSPITAL ENCOUNTER (OUTPATIENT)
Dept: VASCULAR LAB | Age: 63
Discharge: HOME | End: 2023-07-25
Payer: COMMERCIAL

## 2023-07-25 VITALS
SYSTOLIC BLOOD PRESSURE: 139 MMHG | DIASTOLIC BLOOD PRESSURE: 77 MMHG | RESPIRATION RATE: 16 BRPM | HEART RATE: 97 BPM | TEMPERATURE: 97 F

## 2023-07-25 DIAGNOSIS — L97.512 DIABETIC ULCER OF TOE OF RIGHT FOOT ASSOCIATED WITH TYPE 2 DIABETES MELLITUS, WITH FAT LAYER EXPOSED (HCC): ICD-10-CM

## 2023-07-25 DIAGNOSIS — I70.203 ATHEROSCLEROSIS OF ARTERY OF BOTH LOWER EXTREMITIES (HCC): ICD-10-CM

## 2023-07-25 DIAGNOSIS — E11.621 DIABETIC ULCER OF TOE OF RIGHT FOOT ASSOCIATED WITH TYPE 2 DIABETES MELLITUS, WITH FAT LAYER EXPOSED (HCC): ICD-10-CM

## 2023-07-25 DIAGNOSIS — E11.628 DIABETIC INFECTION OF RIGHT FOOT (HCC): Primary | ICD-10-CM

## 2023-07-25 DIAGNOSIS — L97.423 ULCER OF LEFT HEEL, WITH NECROSIS OF MUSCLE (HCC): ICD-10-CM

## 2023-07-25 DIAGNOSIS — L08.9 DIABETIC INFECTION OF RIGHT FOOT (HCC): Primary | ICD-10-CM

## 2023-07-25 PROCEDURE — 6370000000 HC RX 637 (ALT 250 FOR IP): Performed by: PODIATRIST

## 2023-07-25 PROCEDURE — 93922 UPR/L XTREMITY ART 2 LEVELS: CPT

## 2023-07-25 PROCEDURE — 11042 DBRDMT SUBQ TIS 1ST 20SQCM/<: CPT

## 2023-07-25 RX ORDER — GENTAMICIN SULFATE 1 MG/G
OINTMENT TOPICAL ONCE
OUTPATIENT
Start: 2023-07-25 | End: 2023-07-25

## 2023-07-25 RX ORDER — LIDOCAINE HYDROCHLORIDE 40 MG/ML
SOLUTION TOPICAL ONCE
Status: COMPLETED | OUTPATIENT
Start: 2023-07-25 | End: 2023-07-25

## 2023-07-25 RX ORDER — BACITRACIN ZINC AND POLYMYXIN B SULFATE 500; 1000 [USP'U]/G; [USP'U]/G
OINTMENT TOPICAL ONCE
OUTPATIENT
Start: 2023-07-25 | End: 2023-07-25

## 2023-07-25 RX ORDER — IBUPROFEN 200 MG
TABLET ORAL ONCE
OUTPATIENT
Start: 2023-07-25 | End: 2023-07-25

## 2023-07-25 RX ORDER — BETAMETHASONE DIPROPIONATE 0.05 %
OINTMENT (GRAM) TOPICAL ONCE
OUTPATIENT
Start: 2023-07-25 | End: 2023-07-25

## 2023-07-25 RX ORDER — LIDOCAINE 50 MG/G
OINTMENT TOPICAL ONCE
OUTPATIENT
Start: 2023-07-25 | End: 2023-07-25

## 2023-07-25 RX ORDER — LIDOCAINE HYDROCHLORIDE 40 MG/ML
SOLUTION TOPICAL ONCE
OUTPATIENT
Start: 2023-07-25 | End: 2023-07-25

## 2023-07-25 RX ORDER — LIDOCAINE HYDROCHLORIDE 20 MG/ML
JELLY TOPICAL ONCE
OUTPATIENT
Start: 2023-07-25 | End: 2023-07-25

## 2023-07-25 RX ORDER — CLOBETASOL PROPIONATE 0.5 MG/G
OINTMENT TOPICAL ONCE
OUTPATIENT
Start: 2023-07-25 | End: 2023-07-25

## 2023-07-25 RX ORDER — GINSENG 100 MG
CAPSULE ORAL ONCE
OUTPATIENT
Start: 2023-07-25 | End: 2023-07-25

## 2023-07-25 RX ORDER — LIDOCAINE 40 MG/G
CREAM TOPICAL ONCE
OUTPATIENT
Start: 2023-07-25 | End: 2023-07-25

## 2023-07-25 RX ADMIN — LIDOCAINE HYDROCHLORIDE: 40 SOLUTION TOPICAL at 15:04

## 2023-07-25 NOTE — DISCHARGE INSTRUCTIONS
411 [facility] Physician Orders and Discharge Instructions  [address] [place] [address]. [place] [address]  Telephone: [phone]  NAME:  Adalberto Mclaughlin  YOB: 1960  MEDICAL RECORD NUMBER:  [id number]  DATE: 7/25/23     Return Appointment:  Return Appointment: With Mitchel Black DPM  in  1 [facility])  [] Return Appointment for a Wound Assessment with the nurse on:     Future Appointments   Date Time Provider [address]   10/3/2023 10:30 AM Valerie David MD [facility]: [address] [place] [address] home care phone number [phone], fax: [phone]     Medically necessary services for evaluation and treatment: [x]Skilled Nursing (using clean technique) []PT (Eval & Treat) []OT (Eval & Treat) []Social Work []Dietician []Other:      Wound care instructions: If you smoke we ask that you refrain from smoking. Smoking inhibits wounds from healing. When taking antibiotics take the entire prescription as ordered. Do not stop taking until medication is all gone unless otherwise instructed. Exercise as tolerated. Keep weight off wounds and reposition every 2 hours if applicable. Do not get wounds wet in the bath or shower unless otherwise instructed by your physician. If your wound is on your foot or leg, you may purchase a cast bag. Please ask at the pharmacy. Wash hands with soap and water prior to and after every dressing change. [x]Wash wounds with: 0.9% normal saline  [x]Donna wound Topical Treatments: Do not apply lotions, creams, or ointments to the skin around the wound bed unless directed as followed:   Apply around the wound: Nothing         [x]Wound Location:  all left foot wounds   Apply Primary Dressing to wound: Collagen (I.e. Puracol)  Secondary Dressing: 4X4 gauze pad, Bulky roll gauze, and Ace wrap   Avoid contact of tape with skin if possible.   When to change Dressing: 3 times per

## 2023-07-31 ENCOUNTER — TELEPHONE (OUTPATIENT)
Dept: WOUND CARE | Age: 63
End: 2023-07-31

## 2023-07-31 DIAGNOSIS — L97.512 DIABETIC ULCER OF TOE OF RIGHT FOOT ASSOCIATED WITH TYPE 2 DIABETES MELLITUS, WITH FAT LAYER EXPOSED (HCC): ICD-10-CM

## 2023-07-31 DIAGNOSIS — E11.628 DIABETIC INFECTION OF RIGHT FOOT (HCC): Primary | ICD-10-CM

## 2023-07-31 DIAGNOSIS — L08.9 DIABETIC INFECTION OF RIGHT FOOT (HCC): Primary | ICD-10-CM

## 2023-07-31 DIAGNOSIS — L97.423 ULCER OF LEFT HEEL, WITH NECROSIS OF MUSCLE (HCC): ICD-10-CM

## 2023-07-31 DIAGNOSIS — E11.621 DIABETIC ULCER OF TOE OF RIGHT FOOT ASSOCIATED WITH TYPE 2 DIABETES MELLITUS, WITH FAT LAYER EXPOSED (HCC): ICD-10-CM

## 2023-08-04 ENCOUNTER — CLINICAL DOCUMENTATION (OUTPATIENT)
Dept: WOUND CARE | Age: 63
End: 2023-08-04

## 2023-08-04 NOTE — PROGRESS NOTES
Insurance Verification Request      Ordering Center: [facility]  [address] [place] [address]. [address] [facility] Phone Number: [phone]  Fax Number: [phone]    Facility NPI: [id number]  Facility Tax ID [id number]    Provider Information:      Provider's Name: Dr Henri Sams: Mira Meckel, DPM NPI: [id number]      Patient Information:     25 [facility] [address]   [phone]   : 1960  AGE: 59 y.o. GENDER: female   TODAYS DATE:  2023    Insurance:     PRIMARY INSURANCE:  Plan:   Coverage:   Effective Date:   Group Number: [unfilled]  Subscriber Number: [id number] - (Commercial)    Payer/Plan Subscr  Sex Relation Sub. Ins. ID Effective Group Num   1. [address] [facility] 1960 Female Self [id number]                                   [address]   2.  [address] [place] [address] 1960 Female Self [id number]                                    [address]       Application/product Information:     Benefit Verification Request:    Reason for Request: New Wound    [employer] ([employer]) FAX# [phone]    Face/Hands/Feet 13487 (1st 25 sq cm)    Theraskin    Has patient been treated with any other Skin Substitute on this Wound:     Yes Name of the product or produces :GraftJacket, Number of previous applications :1, Wound size :15.75 sq/cm, Number of wounds :1,  Location of wound :Right Medial Ankle, Duration of wound >30 days, Estimated number of applications :10     If yes, How many previous applications: 1    WOUND #: 1    Total Square CM: 2.2    Procedure setting: Hospital Outpatient Department    Is the Patient currently in a skilled nursing facility: No     Is the wound work related: No    Procedure date: 8/15/23    Patient Information:     Additional Dx Codes: E11.42, T53.952    Problem List Items Addressed This Visit    None      Advanced Modality Checklist  Is Wound

## 2023-08-04 NOTE — TELEPHONE ENCOUNTER
Call returned from [place], stated that pt has only 60 total visits for all home care visits and she is about to reach her max before out of pocket cost will be billed. Currently nursing has limited to 1 visit per week while pt still receiving home OT/PT. Will need to have new POC to help accommodate further treatments. Pt to have f/u appt with Dr Reema Delong next Tuesday 8/8/23. Also is concerned about the pt's heel wound that it doesn't look healthy.

## 2023-08-08 ENCOUNTER — HOSPITAL ENCOUNTER (OUTPATIENT)
Dept: WOUND CARE | Age: 63
Discharge: HOME | End: 2023-08-08
Payer: COMMERCIAL

## 2023-08-08 VITALS
TEMPERATURE: 97.6 F | DIASTOLIC BLOOD PRESSURE: 86 MMHG | HEART RATE: 101 BPM | RESPIRATION RATE: 16 BRPM | SYSTOLIC BLOOD PRESSURE: 134 MMHG

## 2023-08-08 DIAGNOSIS — L08.9 DIABETIC INFECTION OF RIGHT FOOT (HCC): Primary | ICD-10-CM

## 2023-08-08 DIAGNOSIS — E11.621 DIABETIC ULCER OF TOE OF RIGHT FOOT ASSOCIATED WITH TYPE 2 DIABETES MELLITUS, WITH FAT LAYER EXPOSED (HCC): ICD-10-CM

## 2023-08-08 DIAGNOSIS — E11.628 DIABETIC INFECTION OF RIGHT FOOT (HCC): Primary | ICD-10-CM

## 2023-08-08 DIAGNOSIS — L97.423 ULCER OF LEFT HEEL, WITH NECROSIS OF MUSCLE (HCC): ICD-10-CM

## 2023-08-08 DIAGNOSIS — L97.512 DIABETIC ULCER OF TOE OF RIGHT FOOT ASSOCIATED WITH TYPE 2 DIABETES MELLITUS, WITH FAT LAYER EXPOSED (HCC): ICD-10-CM

## 2023-08-08 PROCEDURE — 6370000000 HC RX 637 (ALT 250 FOR IP): Performed by: PODIATRIST

## 2023-08-08 PROCEDURE — 11042 DBRDMT SUBQ TIS 1ST 20SQCM/<: CPT

## 2023-08-08 RX ORDER — LIDOCAINE 50 MG/G
OINTMENT TOPICAL ONCE
OUTPATIENT
Start: 2023-08-08 | End: 2023-08-08

## 2023-08-08 RX ORDER — CLOBETASOL PROPIONATE 0.5 MG/G
OINTMENT TOPICAL ONCE
OUTPATIENT
Start: 2023-08-08 | End: 2023-08-08

## 2023-08-08 RX ORDER — BETAMETHASONE DIPROPIONATE 0.05 %
OINTMENT (GRAM) TOPICAL ONCE
OUTPATIENT
Start: 2023-08-08 | End: 2023-08-08

## 2023-08-08 RX ORDER — IBUPROFEN 200 MG
TABLET ORAL ONCE
OUTPATIENT
Start: 2023-08-08 | End: 2023-08-08

## 2023-08-08 RX ORDER — BACITRACIN ZINC AND POLYMYXIN B SULFATE 500; 1000 [USP'U]/G; [USP'U]/G
OINTMENT TOPICAL ONCE
OUTPATIENT
Start: 2023-08-08 | End: 2023-08-08

## 2023-08-08 RX ORDER — GINSENG 100 MG
CAPSULE ORAL ONCE
OUTPATIENT
Start: 2023-08-08 | End: 2023-08-08

## 2023-08-08 RX ORDER — LIDOCAINE HYDROCHLORIDE 20 MG/ML
JELLY TOPICAL ONCE
OUTPATIENT
Start: 2023-08-08 | End: 2023-08-08

## 2023-08-08 RX ORDER — LIDOCAINE HYDROCHLORIDE 40 MG/ML
SOLUTION TOPICAL ONCE
OUTPATIENT
Start: 2023-08-08 | End: 2023-08-08

## 2023-08-08 RX ORDER — LIDOCAINE 40 MG/G
CREAM TOPICAL ONCE
OUTPATIENT
Start: 2023-08-08 | End: 2023-08-08

## 2023-08-08 RX ORDER — LIDOCAINE HYDROCHLORIDE 40 MG/ML
SOLUTION TOPICAL ONCE
Status: COMPLETED | OUTPATIENT
Start: 2023-08-08 | End: 2023-08-08

## 2023-08-08 RX ORDER — GENTAMICIN SULFATE 1 MG/G
OINTMENT TOPICAL ONCE
OUTPATIENT
Start: 2023-08-08 | End: 2023-08-08

## 2023-08-08 RX ADMIN — COLLAGENASE SANTYL: 250 OINTMENT TOPICAL at 16:23

## 2023-08-08 RX ADMIN — LIDOCAINE HYDROCHLORIDE: 40 SOLUTION TOPICAL at 15:39

## 2023-08-08 NOTE — PROGRESS NOTES
06/06/23 235 lb (106.6 kg)   05/03/23 214 lb 15.2 oz (97.5 kg)       PHYSICAL EXAM  Physical Exam    Patient is grossly edematous. There is pitting edema noted on both her upper and lower extremities and her abdomen is distended. Patient denies any shortness of breath. There are multiple blisters on the right lower extremity that are filled with clear serous drainage. Previously noted ulcerations on the right foot have epithelialized. There are multiple ulcerations noted on the left foot including the medial lateral aspects of the heel the anterior aspect of the foot and ankle and the posterior heel. the previously noted erythema and purulent drainage encountered in the hospital has resolved. Patient has no calf pain or tenderness noted. Assessment:        Problem List Items Addressed This Visit       Diabetic infection of right foot ([address]) - Primary    Relevant Orders    Initiate Outpatient Wound Care Protocol    Diabetic ulcer of toe of right foot associated with type 2 diabetes mellitus, with fat layer exposed ([address])    Relevant Orders    Initiate Outpatient Wound Care Protocol    Ulcer of left heel, with necrosis of muscle ([address])    Relevant Orders    Initiate Outpatient Wound Care Protocol          Procedure Note  Indications:  Based on my examination of this patient's wound(s)/ulcer(s) today, debridement is required to promote healing and evaluate the wound base. Performed by: Haseeb Mott DPM    Consent obtained:  Yes    Time out taken:  Yes    Pain Control: Anesthetic  Anesthetic: 4% Lidocaine Liquid Topical       Debridement: Excisional Debridement    Using curette the wound(s)/ulcer(s) was/were debrided down through and including the removal of epidermis, dermis, and subcutaneous tissue.         Devitalized Tissue Debrided:  fibrin, biofilm, and slough    Pre Debridement Measurements:  Are located in the [place]  Documentation Flow Sheet    Diabetic/Pressure/Non Pressure Ulcers only:  Ulcer:

## 2023-08-08 NOTE — DISCHARGE INSTRUCTIONS
411 [facility] Physician Orders and Discharge Instructions  [address] [facility]. [facility]  Telephone: [phone]  NAME:  Sondra Rodriguez  YOB: 1960  MEDICAL RECORD NUMBER:  [id number]  DATE: 8/8/23     Return Appointment:  Return Appointment: With Cassius Rahman DPM  in  1 [facility])  [] Return Appointment for a Wound Assessment with the nurse on:     Future Appointments   Date Time Provider [facility] [address]   10/3/2023 10:30 AM Chantel Batres MD [facility]: [address] [place] [address] Street: Phone: [phone]   Fax: [phone]      Medically necessary services for evaluation and treatment: [x]Skilled Nursing (using clean technique) []PT (Eval & Treat) []OT (Eval & Treat) []Social Work []Dietician []Other:      Wound care instructions: If you smoke we ask that you refrain from smoking. Smoking inhibits wounds from healing. When taking antibiotics take the entire prescription as ordered. Do not stop taking until medication is all gone unless otherwise instructed. Exercise as tolerated. Keep weight off wounds and reposition every 2 hours if applicable. Do not get wounds wet in the bath or shower unless otherwise instructed by your physician. If your wound is on your foot or leg, you may purchase a cast bag. Please ask at the pharmacy. Wash hands with soap and water prior to and after every dressing change. [x]Wash wounds with: Vashe wash - Apply enough Vashe to soak a piece of gauze and place on wound bed for 5-10 minutes. No need to rinse after soaking.   [x]Donna wound Topical Treatments: Do not apply lotions, creams, or ointments to the skin around the wound bed unless directed as followed:   Apply around the wound: Nothing         [x]Wound Location: Left medial/lateral foot  Apply Primary Dressing to wound: Collagen (I.e. Puracol)  Secondary Dressing: 4X4 gauze pad and Bulky roll gauze   Avoid

## 2023-08-10 ENCOUNTER — TELEPHONE (OUTPATIENT)
Dept: CARDIOLOGY CLINIC | Age: 63
End: 2023-08-10

## 2023-08-10 NOTE — TELEPHONE ENCOUNTER
Patient's , Franklyn Polk, called stating that the patient has been retaining water in the arms, legs, and stomach. A home health nurse gave the patient 40mg of lasix, which helped the arms subside a bit. Franklyn Polk would like Dr. Sunil Razo advice.    Please call Franklyn Polk back at [phone]

## 2023-08-31 ENCOUNTER — HOSPITAL ENCOUNTER (OUTPATIENT)
Dept: WOUND CARE | Age: 63
Discharge: HOME | End: 2023-08-31

## 2023-09-05 ENCOUNTER — HOSPITAL ENCOUNTER (OUTPATIENT)
Dept: WOUND CARE | Age: 63
Discharge: HOME | End: 2023-09-05
Payer: COMMERCIAL

## 2023-09-05 VITALS
TEMPERATURE: 98.5 F | HEART RATE: 92 BPM | DIASTOLIC BLOOD PRESSURE: 77 MMHG | SYSTOLIC BLOOD PRESSURE: 125 MMHG | RESPIRATION RATE: 18 BRPM

## 2023-09-05 DIAGNOSIS — L97.423 ULCER OF LEFT HEEL, WITH NECROSIS OF MUSCLE (HCC): ICD-10-CM

## 2023-09-05 DIAGNOSIS — E11.621 DIABETIC ULCER OF TOE OF RIGHT FOOT ASSOCIATED WITH TYPE 2 DIABETES MELLITUS, WITH FAT LAYER EXPOSED (HCC): ICD-10-CM

## 2023-09-05 DIAGNOSIS — E11.628 DIABETIC INFECTION OF RIGHT FOOT (HCC): Primary | ICD-10-CM

## 2023-09-05 DIAGNOSIS — L08.9 DIABETIC INFECTION OF RIGHT FOOT (HCC): Primary | ICD-10-CM

## 2023-09-05 DIAGNOSIS — L97.512 DIABETIC ULCER OF TOE OF RIGHT FOOT ASSOCIATED WITH TYPE 2 DIABETES MELLITUS, WITH FAT LAYER EXPOSED (HCC): ICD-10-CM

## 2023-09-05 PROCEDURE — 6370000000 HC RX 637 (ALT 250 FOR IP): Performed by: PODIATRIST

## 2023-09-05 PROCEDURE — 11042 DBRDMT SUBQ TIS 1ST 20SQCM/<: CPT

## 2023-09-05 RX ORDER — BETAMETHASONE DIPROPIONATE 0.05 %
OINTMENT (GRAM) TOPICAL ONCE
OUTPATIENT
Start: 2023-09-05 | End: 2023-09-05

## 2023-09-05 RX ORDER — GINSENG 100 MG
CAPSULE ORAL ONCE
OUTPATIENT
Start: 2023-09-05 | End: 2023-09-05

## 2023-09-05 RX ORDER — LIDOCAINE HYDROCHLORIDE 20 MG/ML
JELLY TOPICAL ONCE
OUTPATIENT
Start: 2023-09-05 | End: 2023-09-05

## 2023-09-05 RX ORDER — BACITRACIN ZINC AND POLYMYXIN B SULFATE 500; 1000 [USP'U]/G; [USP'U]/G
OINTMENT TOPICAL ONCE
OUTPATIENT
Start: 2023-09-05 | End: 2023-09-05

## 2023-09-05 RX ORDER — LIDOCAINE 40 MG/G
CREAM TOPICAL ONCE
OUTPATIENT
Start: 2023-09-05 | End: 2023-09-05

## 2023-09-05 RX ORDER — IBUPROFEN 200 MG
TABLET ORAL ONCE
OUTPATIENT
Start: 2023-09-05 | End: 2023-09-05

## 2023-09-05 RX ORDER — LIDOCAINE HYDROCHLORIDE 40 MG/ML
SOLUTION TOPICAL ONCE
OUTPATIENT
Start: 2023-09-05 | End: 2023-09-05

## 2023-09-05 RX ORDER — CLOBETASOL PROPIONATE 0.5 MG/G
OINTMENT TOPICAL ONCE
OUTPATIENT
Start: 2023-09-05 | End: 2023-09-05

## 2023-09-05 RX ORDER — LIDOCAINE HYDROCHLORIDE 40 MG/ML
SOLUTION TOPICAL ONCE
Status: COMPLETED | OUTPATIENT
Start: 2023-09-05 | End: 2023-09-05

## 2023-09-05 RX ORDER — LIDOCAINE 50 MG/G
OINTMENT TOPICAL ONCE
OUTPATIENT
Start: 2023-09-05 | End: 2023-09-05

## 2023-09-05 RX ORDER — GENTAMICIN SULFATE 1 MG/G
OINTMENT TOPICAL ONCE
OUTPATIENT
Start: 2023-09-05 | End: 2023-09-05

## 2023-09-05 RX ADMIN — COLLAGENASE SANTYL: 250 OINTMENT TOPICAL at 16:31

## 2023-09-05 RX ADMIN — LIDOCAINE HYDROCHLORIDE: 40 SOLUTION TOPICAL at 15:59

## 2023-09-05 NOTE — PROGRESS NOTES
5PM  Wed: CLOSED  Thur: 8AM - 4:30PM  Fri:  8AM - 4:30PM  The office is closed on all major holidays. Please give us 24-48 business hours to return your call. These hours of operation are subject to change. If you need help with your wounds and cannot wait until we are available, contact your PCP or go to your preferred emergency room. Call your doctor now or seek immediate medical care if:    You have symptoms of infection, such as: Increased pain, swelling, warmth, or redness. Red streaks leading from the area. Pus draining from the area. A fever.          [] Patient unable to sign Discharge Instructions given to ECF/Transportation/POA          Electronically signed by Matt Cheng DPM on 9/5/2023 at 5:01 PM

## 2023-09-05 NOTE — DISCHARGE INSTRUCTIONS
411 [facility] Physician Orders and Discharge Instructions  [address] [address] [facility]  Telephone: [phone]  NAME:  Zena Owens  YOB: 1960  MEDICAL RECORD NUMBER:  [id number]  DATE: 9/5/23     Return Appointment:  Return Appointment: With Camie Phalen, DPM  in  1 [facility])  [] Return Appointment for a Wound Assessment with the nurse on:     Future Appointments   Date Time Provider [address]   10/3/2023 10:30 AM Usman Parish MD [facility]: [address] [facility] Street: Phone: [phone]   Fax: [phone]        Medically necessary services for evaluation and treatment:      [x]Skilled Nursing (using clean technique)        []PT (Eval & Treat)    []OT (Eval & Treat)    []Social Work   []Dietician      []Other:        Wound care instructions: If you smoke we ask that you refrain from smoking. Smoking inhibits wounds from healing. When taking antibiotics take the entire prescription as ordered. Do not stop taking until medication is all gone unless otherwise instructed. Exercise as tolerated. Keep weight off wounds and reposition every 2 hours if applicable. Do not get wounds wet in the bath or shower unless otherwise instructed by your physician. If your wound is on your foot or leg, you may purchase a cast bag. Please ask at the pharmacy. Wash hands with soap and water prior to and after every dressing change. [x]Wash wounds with: Vashe wash - Apply enough Vashe to soak a piece of gauze and place on wound bed for 5-10 minutes. No need to rinse after soaking.   [x]Donna wound Topical Treatments: Do not apply lotions, creams, or ointments to the skin around the wound bed unless directed as followed:   Apply around the wound: Nothing                    [x]Wound Location: Left medial  Apply Primary Dressing to wound: Collagen (I.e. Puracol)  Secondary Dressing: 4X4 gauze pad and

## 2023-09-12 ENCOUNTER — TELEPHONE (OUTPATIENT)
Dept: WOUND CARE | Age: 63
End: 2023-09-12

## 2023-09-12 DIAGNOSIS — L08.9 DIABETIC FOOT INFECTION (HCC): Primary | ICD-10-CM

## 2023-09-12 DIAGNOSIS — E11.628 DIABETIC FOOT INFECTION (HCC): Primary | ICD-10-CM

## 2023-09-12 RX ORDER — CIPROFLOXACIN 500 MG/1
500 TABLET, FILM COATED ORAL 2 TIMES DAILY
Qty: 20 TABLET | Refills: 0 | Status: SHIPPED | OUTPATIENT
Start: 2023-09-12 | End: 2023-09-22

## 2023-09-12 RX ORDER — CLINDAMYCIN HYDROCHLORIDE 300 MG/1
300 CAPSULE ORAL 3 TIMES DAILY
Qty: 30 CAPSULE | Refills: 0 | Status: SHIPPED | OUTPATIENT
Start: 2023-09-12 | End: 2023-09-22

## 2023-09-12 NOTE — TELEPHONE ENCOUNTER
Area around wound is more red and is getting larger on one side.   Is Antibiotic possible to be called in?

## 2023-09-12 NOTE — TELEPHONE ENCOUNTER
Pt's  called requesting an abx. States that when he went to change the drsg he noticed more pinkness on the top of her foot. This RN contacted Dr Donnie Smith and is awaiting further action to take.

## 2023-09-19 ENCOUNTER — OFFICE VISIT (OUTPATIENT)
Dept: FAMILY MEDICINE CLINIC | Age: 63
End: 2023-09-19
Payer: COMMERCIAL

## 2023-09-19 ENCOUNTER — HOSPITAL ENCOUNTER (OUTPATIENT)
Dept: WOUND CARE | Age: 63
Discharge: HOME | End: 2023-09-19
Payer: COMMERCIAL

## 2023-09-19 VITALS
DIASTOLIC BLOOD PRESSURE: 82 MMHG | WEIGHT: 230 LBS | BODY MASS INDEX: 39.27 KG/M2 | OXYGEN SATURATION: 97 % | HEIGHT: 64 IN | SYSTOLIC BLOOD PRESSURE: 128 MMHG | HEART RATE: 99 BPM

## 2023-09-19 VITALS
DIASTOLIC BLOOD PRESSURE: 73 MMHG | HEART RATE: 99 BPM | RESPIRATION RATE: 18 BRPM | TEMPERATURE: 97.5 F | SYSTOLIC BLOOD PRESSURE: 121 MMHG

## 2023-09-19 DIAGNOSIS — E11.621 DIABETIC ULCER OF TOE OF RIGHT FOOT ASSOCIATED WITH TYPE 2 DIABETES MELLITUS, WITH FAT LAYER EXPOSED (HCC): ICD-10-CM

## 2023-09-19 DIAGNOSIS — E11.69 DIABETES MELLITUS TYPE 2 IN OBESE (HCC): Chronic | ICD-10-CM

## 2023-09-19 DIAGNOSIS — L97.512 DIABETIC ULCER OF TOE OF RIGHT FOOT ASSOCIATED WITH TYPE 2 DIABETES MELLITUS, WITH FAT LAYER EXPOSED (HCC): ICD-10-CM

## 2023-09-19 DIAGNOSIS — L97.423 ULCER OF LEFT HEEL, WITH NECROSIS OF MUSCLE (HCC): ICD-10-CM

## 2023-09-19 DIAGNOSIS — I50.20 HFREF (HEART FAILURE WITH REDUCED EJECTION FRACTION) (HCC): ICD-10-CM

## 2023-09-19 DIAGNOSIS — E66.9 DIABETES MELLITUS TYPE 2 IN OBESE (HCC): Chronic | ICD-10-CM

## 2023-09-19 DIAGNOSIS — I25.10 CORONARY ARTERY DISEASE INVOLVING NATIVE CORONARY ARTERY OF NATIVE HEART WITHOUT ANGINA PECTORIS: Primary | ICD-10-CM

## 2023-09-19 DIAGNOSIS — L08.9 DIABETIC INFECTION OF RIGHT FOOT (HCC): Primary | ICD-10-CM

## 2023-09-19 DIAGNOSIS — E11.628 DIABETIC INFECTION OF RIGHT FOOT (HCC): Primary | ICD-10-CM

## 2023-09-19 LAB — HBA1C MFR BLD: 6.5 %

## 2023-09-19 PROCEDURE — 83036 HEMOGLOBIN GLYCOSYLATED A1C: CPT | Performed by: STUDENT IN AN ORGANIZED HEALTH CARE EDUCATION/TRAINING PROGRAM

## 2023-09-19 PROCEDURE — 99214 OFFICE O/P EST MOD 30 MIN: CPT | Performed by: STUDENT IN AN ORGANIZED HEALTH CARE EDUCATION/TRAINING PROGRAM

## 2023-09-19 PROCEDURE — 3044F HG A1C LEVEL LT 7.0%: CPT | Performed by: STUDENT IN AN ORGANIZED HEALTH CARE EDUCATION/TRAINING PROGRAM

## 2023-09-19 PROCEDURE — 6370000000 HC RX 637 (ALT 250 FOR IP): Performed by: PODIATRIST

## 2023-09-19 PROCEDURE — 11042 DBRDMT SUBQ TIS 1ST 20SQCM/<: CPT

## 2023-09-19 RX ORDER — LIDOCAINE 50 MG/G
OINTMENT TOPICAL ONCE
OUTPATIENT
Start: 2023-09-19 | End: 2023-09-19

## 2023-09-19 RX ORDER — CLOBETASOL PROPIONATE 0.5 MG/G
OINTMENT TOPICAL ONCE
OUTPATIENT
Start: 2023-09-19 | End: 2023-09-19

## 2023-09-19 RX ORDER — TRIAMCINOLONE ACETONIDE 1 MG/G
OINTMENT TOPICAL ONCE
OUTPATIENT
Start: 2023-09-19 | End: 2023-09-19

## 2023-09-19 RX ORDER — BETAMETHASONE DIPROPIONATE 0.05 %
OINTMENT (GRAM) TOPICAL ONCE
OUTPATIENT
Start: 2023-09-19 | End: 2023-09-19

## 2023-09-19 RX ORDER — FUROSEMIDE 40 MG/1
40 TABLET ORAL DAILY
Qty: 60 TABLET | Refills: 3 | Status: SHIPPED | OUTPATIENT
Start: 2023-09-19

## 2023-09-19 RX ORDER — LIDOCAINE HYDROCHLORIDE 20 MG/ML
JELLY TOPICAL ONCE
OUTPATIENT
Start: 2023-09-19 | End: 2023-09-19

## 2023-09-19 RX ORDER — GINSENG 100 MG
CAPSULE ORAL ONCE
OUTPATIENT
Start: 2023-09-19 | End: 2023-09-19

## 2023-09-19 RX ORDER — GENTAMICIN SULFATE 1 MG/G
OINTMENT TOPICAL ONCE
OUTPATIENT
Start: 2023-09-19 | End: 2023-09-19

## 2023-09-19 RX ORDER — BACITRACIN ZINC AND POLYMYXIN B SULFATE 500; 1000 [USP'U]/G; [USP'U]/G
OINTMENT TOPICAL ONCE
OUTPATIENT
Start: 2023-09-19 | End: 2023-09-19

## 2023-09-19 RX ORDER — LIDOCAINE 40 MG/G
CREAM TOPICAL ONCE
OUTPATIENT
Start: 2023-09-19 | End: 2023-09-19

## 2023-09-19 RX ORDER — LIDOCAINE HYDROCHLORIDE 40 MG/ML
SOLUTION TOPICAL ONCE
Status: COMPLETED | OUTPATIENT
Start: 2023-09-19 | End: 2023-09-19

## 2023-09-19 RX ORDER — IBUPROFEN 200 MG
TABLET ORAL ONCE
OUTPATIENT
Start: 2023-09-19 | End: 2023-09-19

## 2023-09-19 RX ORDER — LIDOCAINE HYDROCHLORIDE 40 MG/ML
SOLUTION TOPICAL ONCE
OUTPATIENT
Start: 2023-09-19 | End: 2023-09-19

## 2023-09-19 RX ADMIN — LIDOCAINE HYDROCHLORIDE: 40 SOLUTION TOPICAL at 15:34

## 2023-09-19 RX ADMIN — COLLAGENASE SANTYL: 250 OINTMENT TOPICAL at 16:29

## 2023-09-19 ASSESSMENT — ENCOUNTER SYMPTOMS
EYE PAIN: 0
RHINORRHEA: 0
NAUSEA: 0
SHORTNESS OF BREATH: 0
ABDOMINAL PAIN: 0
SORE THROAT: 0

## 2023-09-19 NOTE — PROGRESS NOTES
Theophyllines Hives    Apixaban      Foggy and fatigued    Metformin Other (See Comments)     Gets angry and mcgrath    Morphine Nausea And Vomiting       Past Medical History:   Diagnosis Date    Arthritis     Asthma     exercise/cold induced    Blindness, legal     CVA (cerebral vascular accident) ([address]) 2015    Depression     Diabetes mellitus ([address])     ION (ischemic optic neuropathy)     developed post-op r/t hypotension    Osteomyelitis ([address])     Left foot; finished Vanc. end of . TIA (transient ischemic attack)        Past Surgical History:   Procedure Laterality Date    BACK SURGERY       SECTION      CHOLECYSTECTOMY      ELBOW SURGERY Left     FOOT DEBRIDEMENT Left 2023    LEFT FOOT INCISION AND DEBRIDEMENT/ POSSIBLE GRAFT APPLICATION/ POSSIBLE WOUND VACUUM APPLICATION performed by Yvonne Napoles DPM at [address] 2014    INCISION AND DRAINAGE MID FOOT LEFT, ENDOSCOPIC GASTROC    FOOT SURGERY Left 2014    LEFT FOOT EXTERNAL FIXATOR REMOVAL        HYSTERECTOMY (CERVIX STATUS UNKNOWN)      ORTHOPEDIC SURGERY      OTHER SURGICAL HISTORY Left 2014    INCISION AND DRAINAGE LEFT FOOT    SOCORRO AND BSO (CERVIX REMOVED)      UPPER GASTROINTESTINAL ENDOSCOPY N/A 3/9/2023    EGD DIAGNOSTIC ONLY performed by Chet Ramos MD at [address] [facility] Left 1/3/8394    .  performed by Yvonne Napoles DPM at [address] History     Socioeconomic History    Marital status: Legally      Spouse name: Not on file    Number of children: Not on file    Years of education: Not on file    Highest education level: Not on file   Occupational History    Not on file   Tobacco Use    Smoking status: Never    Smokeless tobacco: Never   Vaping Use    Vaping Use: Not on file   Substance and Sexual Activity    Alcohol use: No    Drug use: No    Sexual activity: Not on file   Other Topics Concern    Not on file   Social History Narrative    Not

## 2023-09-19 NOTE — PATIENT INSTRUCTIONS
[address] [facility] Physician Orders and Discharge Instructions  [address] [place] [address]. [place] [address]  Telephone: [phone]  NAME:  Vickie Aguilar  YOB: 1960  MEDICAL RECORD NUMBER:  [id number]  DATE: 9/19/23     Return Appointment:  Return Appointment: With Tory Morillo DPM  in  [facility])  [] Return Appointment for a Wound Assessment with the nurse on:     Future Appointments   Date Time Provider [address]   10/3/2023 10:30 AM Melania Dempsey MD [facility]: [address] [facility] [address] [facility] [address]: Phone: [phone]   Fax: [phone]        Medically necessary services for evaluation and treatment:      [x]Skilled Nursing (using clean technique)        []PT (Eval & Treat)    []OT (Eval & Treat)    []Social Work   []Dietician      []Other:        Wound care instructions: If you smoke we ask that you refrain from smoking. Smoking inhibits wounds from healing. When taking antibiotics take the entire prescription as ordered. Do not stop taking until medication is all gone unless otherwise instructed. Exercise as tolerated. Keep weight off wounds and reposition every 2 hours if applicable. Do not get wounds wet in the bath or shower unless otherwise instructed by your physician. If your wound is on your foot or leg, you may purchase a cast bag. Please ask at the pharmacy. Wash hands with soap and water prior to and after every dressing change. [x]Wash wounds with: Vashe wash - Apply enough Vashe to soak a piece of gauze and place on wound bed for 5-10 minutes. No need to rinse after soaking.   [x]Donna wound Topical Treatments: Do not apply lotions, creams, or ointments to the skin around the wound bed unless directed as followed:   Apply around the wound: Nothing                    [x]Wound Location: Left medial  Apply Primary Dressing to wound: Collagen (I.e. Puracol)  Secondary Dressing: 4X4 gauze pad and

## 2023-09-20 LAB
CHOLEST SERPL-MCNC: 154 MG/DL (ref 0–199)
HDLC SERPL-MCNC: 38 MG/DL (ref 40–60)
LDLC SERPL CALC-MCNC: 98 MG/DL
TRIGL SERPL-MCNC: 91 MG/DL (ref 0–150)
VLDLC SERPL CALC-MCNC: 18 MG/DL

## 2023-10-17 ENCOUNTER — OFFICE VISIT (OUTPATIENT)
Dept: FAMILY MEDICINE CLINIC | Age: 63
End: 2023-10-17
Payer: COMMERCIAL

## 2023-10-17 ENCOUNTER — HOSPITAL ENCOUNTER (OUTPATIENT)
Dept: WOUND CARE | Age: 63
Discharge: HOME | End: 2023-10-17
Attending: PODIATRIST
Payer: COMMERCIAL

## 2023-10-17 VITALS
DIASTOLIC BLOOD PRESSURE: 86 MMHG | RESPIRATION RATE: 16 BRPM | HEART RATE: 93 BPM | TEMPERATURE: 96.8 F | SYSTOLIC BLOOD PRESSURE: 135 MMHG

## 2023-10-17 VITALS
HEIGHT: 64 IN | SYSTOLIC BLOOD PRESSURE: 128 MMHG | HEART RATE: 92 BPM | OXYGEN SATURATION: 98 % | BODY MASS INDEX: 40.12 KG/M2 | WEIGHT: 235 LBS | DIASTOLIC BLOOD PRESSURE: 88 MMHG

## 2023-10-17 DIAGNOSIS — E66.9 DIABETES MELLITUS TYPE 2 IN OBESE (HCC): ICD-10-CM

## 2023-10-17 DIAGNOSIS — L97.423 ULCER OF LEFT HEEL, WITH NECROSIS OF MUSCLE (HCC): ICD-10-CM

## 2023-10-17 DIAGNOSIS — L08.9 DIABETIC INFECTION OF RIGHT FOOT (HCC): Primary | ICD-10-CM

## 2023-10-17 DIAGNOSIS — I25.10 CORONARY ARTERY DISEASE INVOLVING NATIVE CORONARY ARTERY OF NATIVE HEART WITHOUT ANGINA PECTORIS: ICD-10-CM

## 2023-10-17 DIAGNOSIS — Z12.11 COLON CANCER SCREENING: ICD-10-CM

## 2023-10-17 DIAGNOSIS — E11.69 DIABETES MELLITUS TYPE 2 IN OBESE (HCC): ICD-10-CM

## 2023-10-17 DIAGNOSIS — E11.621 DIABETIC ULCER OF TOE OF RIGHT FOOT ASSOCIATED WITH TYPE 2 DIABETES MELLITUS, WITH FAT LAYER EXPOSED (HCC): ICD-10-CM

## 2023-10-17 DIAGNOSIS — R53.81 PHYSICAL DECONDITIONING: ICD-10-CM

## 2023-10-17 DIAGNOSIS — E11.628 DIABETIC INFECTION OF RIGHT FOOT (HCC): Primary | ICD-10-CM

## 2023-10-17 DIAGNOSIS — L97.512 DIABETIC ULCER OF TOE OF RIGHT FOOT ASSOCIATED WITH TYPE 2 DIABETES MELLITUS, WITH FAT LAYER EXPOSED (HCC): ICD-10-CM

## 2023-10-17 DIAGNOSIS — Z00.01 ENCOUNTER FOR GENERAL ADULT MEDICAL EXAMINATION WITH ABNORMAL FINDINGS: Primary | ICD-10-CM

## 2023-10-17 DIAGNOSIS — I50.20 HFREF (HEART FAILURE WITH REDUCED EJECTION FRACTION) (HCC): ICD-10-CM

## 2023-10-17 PROCEDURE — 99214 OFFICE O/P EST MOD 30 MIN: CPT | Performed by: STUDENT IN AN ORGANIZED HEALTH CARE EDUCATION/TRAINING PROGRAM

## 2023-10-17 PROCEDURE — 3044F HG A1C LEVEL LT 7.0%: CPT | Performed by: STUDENT IN AN ORGANIZED HEALTH CARE EDUCATION/TRAINING PROGRAM

## 2023-10-17 PROCEDURE — 11044 DBRDMT BONE 1ST 20 SQ CM/<: CPT

## 2023-10-17 RX ORDER — ASPIRIN 81 MG/1
2 TABLET ORAL
COMMUNITY

## 2023-10-17 RX ORDER — BACITRACIN ZINC AND POLYMYXIN B SULFATE 500; 1000 [USP'U]/G; [USP'U]/G
OINTMENT TOPICAL ONCE
OUTPATIENT
Start: 2023-10-17 | End: 2023-10-17

## 2023-10-17 RX ORDER — LIDOCAINE HYDROCHLORIDE 20 MG/ML
JELLY TOPICAL ONCE
OUTPATIENT
Start: 2023-10-17 | End: 2023-10-17

## 2023-10-17 RX ORDER — POLYETHYLENE GLYCOL 3350 17 G/17G
17 POWDER, FOR SOLUTION ORAL DAILY PRN
COMMUNITY

## 2023-10-17 RX ORDER — LIDOCAINE HYDROCHLORIDE 40 MG/ML
SOLUTION TOPICAL ONCE
OUTPATIENT
Start: 2023-10-17 | End: 2023-10-17

## 2023-10-17 RX ORDER — LIDOCAINE 50 MG/G
OINTMENT TOPICAL ONCE
OUTPATIENT
Start: 2023-10-17 | End: 2023-10-17

## 2023-10-17 RX ORDER — IBUPROFEN 200 MG
TABLET ORAL ONCE
OUTPATIENT
Start: 2023-10-17 | End: 2023-10-17

## 2023-10-17 RX ORDER — MULTIVIT-MIN/IRON/FOLIC ACID/K 18-600-40
1 CAPSULE ORAL DAILY
COMMUNITY

## 2023-10-17 RX ORDER — TRIAMCINOLONE ACETONIDE 1 MG/G
OINTMENT TOPICAL ONCE
OUTPATIENT
Start: 2023-10-17 | End: 2023-10-17

## 2023-10-17 RX ORDER — CALCIUM CARBONATE/VITAMIN D3 500-10/5ML
1 LIQUID (ML) ORAL
COMMUNITY

## 2023-10-17 RX ORDER — LIDOCAINE 40 MG/G
CREAM TOPICAL ONCE
OUTPATIENT
Start: 2023-10-17 | End: 2023-10-17

## 2023-10-17 RX ORDER — GINSENG 100 MG
CAPSULE ORAL ONCE
OUTPATIENT
Start: 2023-10-17 | End: 2023-10-17

## 2023-10-17 RX ORDER — BETAMETHASONE DIPROPIONATE 0.05 %
OINTMENT (GRAM) TOPICAL ONCE
OUTPATIENT
Start: 2023-10-17 | End: 2023-10-17

## 2023-10-17 RX ORDER — FUROSEMIDE 40 MG/1
40 TABLET ORAL DAILY
Qty: 60 TABLET | Refills: 3 | Status: SHIPPED | OUTPATIENT
Start: 2023-10-17

## 2023-10-17 RX ORDER — LIDOCAINE HYDROCHLORIDE 40 MG/ML
SOLUTION TOPICAL ONCE
Status: DISCONTINUED | OUTPATIENT
Start: 2023-10-17 | End: 2023-10-18 | Stop reason: HOSPADM

## 2023-10-17 RX ORDER — CLOBETASOL PROPIONATE 0.5 MG/G
OINTMENT TOPICAL ONCE
OUTPATIENT
Start: 2023-10-17 | End: 2023-10-17

## 2023-10-17 RX ORDER — GENTAMICIN SULFATE 1 MG/G
OINTMENT TOPICAL ONCE
OUTPATIENT
Start: 2023-10-17 | End: 2023-10-17

## 2023-10-17 ASSESSMENT — ENCOUNTER SYMPTOMS
RHINORRHEA: 0
NAUSEA: 0
ABDOMINAL PAIN: 0
SORE THROAT: 0
COUGH: 0
SHORTNESS OF BREATH: 0

## 2023-10-17 NOTE — PROGRESS NOTES
Alli Doss is a 59 y. o.female who presents with   Chief Complaint   Patient presents with    Annual Exam   .    Patient is a 71-year-old female presenting for annual physical exam.  Patient has coronary artery disease with 3 stents done earlier this year. Patient reports that she stopped taking her Plavix, stopped taking her statin, and stopped taking metoprolol. Currently reports only taking 2 baby aspirin. Patient declines using Bjorn Heft for heart failure and diabetes. Reports she does not want to do this. Patient reports she would like to avoid getting urinary tract infections. At her last visit patient has also completely stopped taking her insulin. Additionally patient declines taking Lasix, she is concerned about having to urinate so often and causing kidney problems. Patient unaware that she had heart failure. We discussed the echo back in April but she reports that this was obtained prior to her 3 stents in June. Currently patient denies any shortness of breath or chest pain. Patient sees wound care routinely for management of her lower extremity diabetic wounds and venous stasis. Patient has expressed that she would rather be off of medication and live only 5 years then live for more time having to be on multiple medications. Patient declines feeling depressed. Reports that she has been coping well with her spirituality. Patient declines mammogram.        Review of Systems   Constitutional:  Negative for chills and fatigue. HENT:  Negative for rhinorrhea and sore throat. Eyes:  Negative for visual disturbance. Respiratory:  Negative for cough and shortness of breath. Cardiovascular:  Negative for chest pain. Gastrointestinal:  Negative for abdominal pain and nausea. Musculoskeletal:  Negative for myalgias. Skin:  Negative for rash. Neurological:  Negative for dizziness, light-headedness and headaches.         Past Medical History:   Diagnosis Date    Arthritis

## 2023-10-17 NOTE — PATIENT INSTRUCTIONS
nickel size thickness  Secondary Dressing: 4X4 gauze pad , kerlix, ace bandage   Avoid contact of tape with skin if possible. When to change Dressing: Daily       [x] Edema Control:     Elevate leg(s) above the level of the heart for 30 minutes 4-5 times a day and/or when sitting. Avoid prolonged standing in one place. [x]Off Loading(Pressure Reduction) When: Walking and In Bed  Weight Bearing Status: Partial Weight bearing Left:  Toe Touch; for transfers  Offloading devices: Heel Protector Soft Boot: Left - DO NOT WALK IN WHILE WEARING  [] No Offloading required:      Dietary:  Important dietary reminders:  1. Increase Protein intake (i.e. Lean meats, fish, eggs, legumes, and yogurt)  2. No added salt  3. If diabetic, follow a diabetic diet and check glucose prior to meals or as instructed by your physician. Dietary Supplements(Take twice a day unless instructed otherwise):  [] Angel Barbone  [] 30ml ProStat [] Ensure Complete [] Ensure Max/Premier [] Expedite [] Other:    Your nurse  is:  Teresita Redmond     Electronically signed by Bossman Ferguson RN on 10/17/2023 at 4:57 PM     [address] [place] [address] Information: Should you experience any significant changes in your wound(s) or have questions about your wound care, please contact the Raul [facility]bo[facility] at [phone]. Hours of operation:  Mon:  8AM - 2PM  Tue: 11AM - 5PM  Wed: CLOSED  Thur: 8AM - 4:30PM  Fri:  8AM - 4:30PM  The office is closed on all major holidays. Please give us 24-48 business hours to return your call. These hours of operation are subject to change. If you need help with your wounds and cannot wait until we are available, contact your PCP or go to your preferred emergency room. Call your doctor now or seek immediate medical care if:    You have symptoms of infection, such as: Increased pain, swelling, warmth, or redness. Red streaks leading from the area. Pus draining from the area. A fever.          [] Patient

## 2023-10-17 NOTE — PROGRESS NOTES
Return Appointment for a Wound Assessment with the nurse on:     No future appointments. [address] home care phone number [phone], fax: [phone]     Medically necessary services for evaluation and treatment: [x]Skilled Nursing (using clean technique) []PT (Eval & Treat) []OT (Eval & Treat) []Social Work []Dietician []Other:      Wound care instructions: If you smoke we ask that you refrain from smoking. Smoking inhibits wounds from healing. When taking antibiotics take the entire prescription as ordered. Do not stop taking until medication is all gone unless otherwise instructed. Exercise as tolerated. Keep weight off wounds and reposition every 2 hours if applicable. Do not get wounds wet in the bath or shower unless otherwise instructed by your physician. If your wound is on your foot or leg, you may purchase a cast bag. Please ask at the pharmacy. Wash hands with soap and water prior to and after every dressing change. [x]Wash wounds with: 0.9% normal saline  [x]Donna wound Topical Treatments: Do not apply lotions, creams, or ointments to the skin around the wound bed unless directed as followed:   Apply around the wound: Nothing         [x]Wound Location: left medial and dorsal  foot   Apply Primary Dressing to wound: Collagen (I.e. Puracol)  Secondary Dressing: 4X4 gauze pad and Bulky roll gauze, ace bandage   Avoid contact of tape with skin if possible. When to change Dressing: Every other day    [x]Wound Location: left heel wound   Apply Primary Dressing to wound: Pharmaceutical medication: santyl - nickel size thickness  Secondary Dressing: 4X4 gauze pad , kerlix, ace bandage   Avoid contact of tape with skin if possible. When to change Dressing: Daily       [x] Edema Control:     Elevate leg(s) above the level of the heart for 30 minutes 4-5 times a day and/or when sitting. Avoid prolonged standing in one place.         [x]Off Loading(Pressure Reduction) When:

## 2023-11-07 ENCOUNTER — HOSPITAL ENCOUNTER (OUTPATIENT)
Dept: WOUND CARE | Age: 63
Discharge: HOME | End: 2023-11-07
Attending: PODIATRIST
Payer: COMMERCIAL

## 2023-11-07 ENCOUNTER — TRANSCRIBE ORDERS (OUTPATIENT)
Dept: ADMINISTRATIVE | Age: 63
End: 2023-11-07

## 2023-11-07 ENCOUNTER — TELEPHONE (OUTPATIENT)
Dept: FAMILY MEDICINE CLINIC | Age: 63
End: 2023-11-07

## 2023-11-07 VITALS
HEART RATE: 89 BPM | SYSTOLIC BLOOD PRESSURE: 134 MMHG | DIASTOLIC BLOOD PRESSURE: 84 MMHG | RESPIRATION RATE: 16 BRPM | TEMPERATURE: 96.8 F

## 2023-11-07 DIAGNOSIS — L97.512 DIABETIC ULCER OF TOE OF RIGHT FOOT ASSOCIATED WITH TYPE 2 DIABETES MELLITUS, WITH FAT LAYER EXPOSED (HCC): ICD-10-CM

## 2023-11-07 DIAGNOSIS — L08.9 DIABETIC INFECTION OF RIGHT FOOT (HCC): Primary | ICD-10-CM

## 2023-11-07 DIAGNOSIS — I48.91 ATRIAL FIBRILLATION, UNSPECIFIED TYPE (HCC): Primary | ICD-10-CM

## 2023-11-07 DIAGNOSIS — E11.621 DIABETIC ULCER OF TOE OF RIGHT FOOT ASSOCIATED WITH TYPE 2 DIABETES MELLITUS, WITH FAT LAYER EXPOSED (HCC): ICD-10-CM

## 2023-11-07 DIAGNOSIS — R06.02 SHORTNESS OF BREATH: Primary | ICD-10-CM

## 2023-11-07 DIAGNOSIS — R06.02 SOB (SHORTNESS OF BREATH): Primary | ICD-10-CM

## 2023-11-07 DIAGNOSIS — E11.628 DIABETIC INFECTION OF RIGHT FOOT (HCC): Primary | ICD-10-CM

## 2023-11-07 DIAGNOSIS — L97.423 ULCER OF LEFT HEEL, WITH NECROSIS OF MUSCLE (HCC): ICD-10-CM

## 2023-11-07 PROCEDURE — 6370000000 HC RX 637 (ALT 250 FOR IP): Performed by: PODIATRIST

## 2023-11-07 RX ORDER — GINSENG 100 MG
CAPSULE ORAL ONCE
OUTPATIENT
Start: 2023-11-07 | End: 2023-11-07

## 2023-11-07 RX ORDER — TRIAMCINOLONE ACETONIDE 1 MG/G
OINTMENT TOPICAL ONCE
OUTPATIENT
Start: 2023-11-07 | End: 2023-11-07

## 2023-11-07 RX ORDER — CLOBETASOL PROPIONATE 0.5 MG/G
OINTMENT TOPICAL ONCE
OUTPATIENT
Start: 2023-11-07 | End: 2023-11-07

## 2023-11-07 RX ORDER — BACITRACIN ZINC AND POLYMYXIN B SULFATE 500; 1000 [USP'U]/G; [USP'U]/G
OINTMENT TOPICAL ONCE
OUTPATIENT
Start: 2023-11-07 | End: 2023-11-07

## 2023-11-07 RX ORDER — IBUPROFEN 200 MG
TABLET ORAL ONCE
OUTPATIENT
Start: 2023-11-07 | End: 2023-11-07

## 2023-11-07 RX ORDER — LIDOCAINE HYDROCHLORIDE 20 MG/ML
JELLY TOPICAL ONCE
OUTPATIENT
Start: 2023-11-07 | End: 2023-11-07

## 2023-11-07 RX ORDER — LIDOCAINE HYDROCHLORIDE 40 MG/ML
SOLUTION TOPICAL ONCE
Status: COMPLETED | OUTPATIENT
Start: 2023-11-07 | End: 2023-11-07

## 2023-11-07 RX ORDER — GENTAMICIN SULFATE 1 MG/G
OINTMENT TOPICAL ONCE
OUTPATIENT
Start: 2023-11-07 | End: 2023-11-07

## 2023-11-07 RX ORDER — LIDOCAINE 50 MG/G
OINTMENT TOPICAL ONCE
OUTPATIENT
Start: 2023-11-07 | End: 2023-11-07

## 2023-11-07 RX ORDER — LIDOCAINE 40 MG/G
CREAM TOPICAL ONCE
OUTPATIENT
Start: 2023-11-07 | End: 2023-11-07

## 2023-11-07 RX ORDER — LIDOCAINE HYDROCHLORIDE 40 MG/ML
SOLUTION TOPICAL ONCE
OUTPATIENT
Start: 2023-11-07 | End: 2023-11-07

## 2023-11-07 RX ORDER — BETAMETHASONE DIPROPIONATE 0.05 %
OINTMENT (GRAM) TOPICAL ONCE
OUTPATIENT
Start: 2023-11-07 | End: 2023-11-07

## 2023-11-07 RX ADMIN — LIDOCAINE HYDROCHLORIDE: 40 SOLUTION TOPICAL at 15:40

## 2023-11-07 NOTE — PATIENT INSTRUCTIONS
[address] [facility] Physician Orders and Discharge Instructions  [address] [place] [address]. [facility]  Telephone: [phone]  NAME:  Sondra Rodriguez  YOB: 1960  MEDICAL RECORD NUMBER:  [id number]  DATE: 10/17/23     Return Appointment:  Return Appointment: With Cassius Rahman DPM  in 2 [facility])  [] Return Appointment for a Wound Assessment with the nurse on:     Future Appointments   Date Time Provider [address]   12/19/2023 10:00 AM SCHEDULE, [address] ECHO  N. [facility] ECHO [address]: [address] [facility] home care phone number [phone], fax: [phone]     Medically necessary services for evaluation and treatment: [x]Skilled Nursing (using clean technique) []PT (Eval & Treat) []OT (Eval & Treat) []Social Work []Dietician []Other:      Wound care instructions: If you smoke we ask that you refrain from smoking. Smoking inhibits wounds from healing. When taking antibiotics take the entire prescription as ordered. Do not stop taking until medication is all gone unless otherwise instructed. Exercise as tolerated. Keep weight off wounds and reposition every 2 hours if applicable. Do not get wounds wet in the bath or shower unless otherwise instructed by your physician. If your wound is on your foot or leg, you may purchase a cast bag. Please ask at the pharmacy. Wash hands with soap and water prior to and after every dressing change. [x]Wash wounds with: 0.9% normal saline  [x]Donna wound Topical Treatments: Do not apply lotions, creams, or ointments to the skin around the wound bed unless directed as followed:   Apply around the wound: Nothing         [x]Wound Location: left medial and dorsal  foot   Apply Primary Dressing to wound: Collagen (I.e. Puracol)  Secondary Dressing: 4X4 gauze pad and Bulky roll gauze, ace bandage   Avoid contact of tape with skin if possible.   When to change Dressing: Every other

## 2023-11-07 NOTE — PROGRESS NOTES
[facility]   Progress Note and Procedure Note      Deirdre Litten  MEDICAL RECORD NUMBER:  [id number]  AGE: 59 y.o. GENDER: female  : 1960  EPISODE DATE:  2023    Subjective:     Chief Complaint   Patient presents with    Wound Check     Left lower leg         HISTORY of PRESENT ILLNESS HPI     Deirdre Litten is a 59 y.o. female who presents today for wound/ulcer evaluation. History of Wound Context: Patient presents today for for continued wound care after an I&D of the left foot and ankle. Patient states that she has had some increased swelling and drainage from her legs because she has not been taking her lasix (again). Patient relates she only takes her Lasix when she feels like she has swelling. Prolonged discussion with patient that she should take it daily as prescribed  Wound/Ulcer Pain Timing/Severity: none  Quality of pain: N/A  Severity:  0 / 10   Modifying Factors: None  Associated Signs/Symptoms: none    Ulcer Identification:  Ulcer Type: diabetic    Contributing Factors: diabetes and poor glucose control    Acute Wound: N/A    PAST MEDICAL HISTORY        Diagnosis Date    Arthritis     Asthma     exercise/cold induced    Blindness, legal     CVA (cerebral vascular accident) ([address]) 2015    Depression     Diabetes mellitus ([address])     Diabetic foot infection ([address]) 2014    ION (ischemic optic neuropathy)     developed post-op r/t hypotension    Osteomyelitis ([address])     Left foot; finished Vanc. end of .     Palpitations 2015    TIA (transient ischemic attack)        PAST SURGICAL HISTORY    Past Surgical History:   Procedure Laterality Date    BACK SURGERY       SECTION      CHOLECYSTECTOMY      ELBOW SURGERY Left     FOOT DEBRIDEMENT Left 2023    LEFT FOOT INCISION AND DEBRIDEMENT/ POSSIBLE GRAFT APPLICATION/ POSSIBLE WOUND VACUUM APPLICATION performed by Jovi Garrido DPM at   [address]

## 2023-11-07 NOTE — TELEPHONE ENCOUNTER
Patient was trying to schedule her echo, but central scheduling said that the diagnosis code put on the order is not covered by her insurance. I talked with central scheduling and they said that shortness of breath would be covered.  The order needs to be changed in the chart to have the code R06.02

## 2023-11-08 DIAGNOSIS — R06.02 SHORTNESS OF BREATH: Primary | ICD-10-CM

## 2023-11-27 ENCOUNTER — TELEPHONE (OUTPATIENT)
Dept: FAMILY MEDICINE CLINIC | Age: 63
End: 2023-11-27

## 2023-11-27 RX ORDER — NITROFURANTOIN 25; 75 MG/1; MG/1
100 CAPSULE ORAL 2 TIMES DAILY
Qty: 10 CAPSULE | Refills: 0 | Status: SHIPPED | OUTPATIENT
Start: 2023-11-27 | End: 2023-12-02

## 2023-11-27 NOTE — TELEPHONE ENCOUNTER
I talked with patient and she said that she is having painful urination off and on and states that she has had them in the past and she feels the same now as she did then.

## 2023-11-27 NOTE — TELEPHONE ENCOUNTER
Pt has a uti and is wondering if we could call something in for her and let her know when it's called in?  Pt cannot take amoxicillin as she has had bad reactions before

## 2023-11-30 NOTE — TELEPHONE ENCOUNTER
Pt said she was getting a little better after the medication but now her symptoms are back to being worse and she only has one day left on the medication.  Pt is wondering what to do now

## 2023-12-01 NOTE — TELEPHONE ENCOUNTER
I talked to patient today and she said that she is feeling much better. She said that yesterday she noticed when she changed her diaper that there was a bloody/creamy discharge that kind of came out in a clump. She said that after that she feels better and is able to go to the restroom with no issues.

## 2023-12-12 RX ORDER — NITROFURANTOIN 25; 75 MG/1; MG/1
100 CAPSULE ORAL 2 TIMES DAILY
Qty: 10 CAPSULE | Refills: 0 | Status: SHIPPED | OUTPATIENT
Start: 2023-12-12 | End: 2023-12-17

## 2024-01-05 ENCOUNTER — TELEPHONE (OUTPATIENT)
Dept: WOUND CARE | Age: 64
End: 2024-01-05

## 2024-01-18 ENCOUNTER — CLINICAL DOCUMENTATION (OUTPATIENT)
Dept: WOUND CARE | Age: 64
End: 2024-01-18

## 2024-01-18 NOTE — PROGRESS NOTES
Pt has not returned to [facility]. LV was on 11/7/23. LDA closed for data purposes.    Start doxycycline for two weeks.  Monitor renal function.  Referral to skilled nursing through home health for wound care.  Follow-up sooner if no improvement.

## 2024-02-14 ENCOUNTER — TELEPHONE (OUTPATIENT)
Dept: ADMINISTRATIVE | Age: 64
End: 2024-02-14

## 2024-02-14 NOTE — TELEPHONE ENCOUNTER
Submitted PA for Farxiga 5MG tablets  [facility]: [id number] STATUS: PENDING.    Follow up done daily; if no decision with in three days we will refax.  If another three days goes by with no decision will call the insurance for status.

## 2024-03-15 ENCOUNTER — TELEPHONE (OUTPATIENT)
Dept: FAMILY MEDICINE CLINIC | Age: 64
End: 2024-03-15

## 2024-03-15 NOTE — TELEPHONE ENCOUNTER
Karol has been assigned as pt's case manger through her insurance. She has reached out to pt to attempt to aid her with her care. Pt main concerns at the moment are out of pocket costs, especially with physical therapy and she wants to try just do exercises at home on her own. She is currently bed bound. Karol tried to educate and stress importance of pt managing her diabetes but pt is not compliant only testing blood sugar about once a month. Karol wanted to reach out to pt's PCP and possibly see how they could work together to help supports pt's needs.

## 2024-04-04 SDOH — ECONOMIC STABILITY: INCOME INSECURITY: HOW HARD IS IT FOR YOU TO PAY FOR THE VERY BASICS LIKE FOOD, HOUSING, MEDICAL CARE, AND HEATING?: NOT HARD AT ALL

## 2024-04-04 SDOH — ECONOMIC STABILITY: FOOD INSECURITY: WITHIN THE PAST 12 MONTHS, YOU WORRIED THAT YOUR FOOD WOULD RUN OUT BEFORE YOU GOT MONEY TO BUY MORE.: NEVER TRUE

## 2024-04-04 SDOH — ECONOMIC STABILITY: HOUSING INSECURITY
IN THE LAST 12 MONTHS, WAS THERE A TIME WHEN YOU DID NOT HAVE A STEADY PLACE TO SLEEP OR SLEPT IN A SHELTER (INCLUDING NOW)?: NO

## 2024-04-04 SDOH — ECONOMIC STABILITY: FOOD INSECURITY: WITHIN THE PAST 12 MONTHS, THE FOOD YOU BOUGHT JUST DIDN'T LAST AND YOU DIDN'T HAVE MONEY TO GET MORE.: NEVER TRUE

## 2024-04-04 SDOH — ECONOMIC STABILITY: TRANSPORTATION INSECURITY
IN THE PAST 12 MONTHS, HAS LACK OF TRANSPORTATION KEPT YOU FROM MEETINGS, WORK, OR FROM GETTING THINGS NEEDED FOR DAILY LIVING?: NO

## 2024-04-08 ENCOUNTER — TELEMEDICINE (OUTPATIENT)
Dept: FAMILY MEDICINE CLINIC | Age: 64
End: 2024-04-08
Payer: COMMERCIAL

## 2024-04-08 DIAGNOSIS — L30.4 INTERTRIGO: Primary | ICD-10-CM

## 2024-04-08 PROCEDURE — 99214 OFFICE O/P EST MOD 30 MIN: CPT | Performed by: STUDENT IN AN ORGANIZED HEALTH CARE EDUCATION/TRAINING PROGRAM

## 2024-04-08 RX ORDER — KETOCONAZOLE 20 MG/G
CREAM TOPICAL
Qty: 30 G | Refills: 1 | Status: SHIPPED | OUTPATIENT
Start: 2024-04-08

## 2024-04-08 ASSESSMENT — ENCOUNTER SYMPTOMS
SHORTNESS OF BREATH: 0
ABDOMINAL PAIN: 0

## 2024-04-08 NOTE — PROGRESS NOTES
Denisha Gutierrez is a 63 y.o.female who presents with No chief complaint on file.  Portions of this chart may have been created with voice recognition software. Occasional wrong-word or \"sound-alike\" substitutions may have occurred due to the inherent limitations of voice recognition software. Read the chart carefully and recognize, using context, where these substitutions have occurred        Patient 63-year-old diabetic noncompliant with medications presents for a virtual visit with concerns over a rash present in her skin folds. Patient states that this has normally been managed with miconazole topical powder.   Denies itching.  Localized to intertriginous areas alone.        Review of Systems   Constitutional:  Negative for fatigue.   Eyes:  Negative for visual disturbance.   Respiratory:  Negative for shortness of breath.    Cardiovascular:  Negative for chest pain.   Gastrointestinal:  Negative for abdominal pain.   Skin:  Positive for rash.   Neurological:  Negative for dizziness, light-headedness and headaches.        Past Medical History:   Diagnosis Date    Arthritis     Asthma     exercise/cold induced    Blindness, legal     CVA (cerebral vascular accident) (HCC) 2015    Depression     Diabetes mellitus (HCC)     Diabetic foot infection ([facility]) 2014    ION (ischemic optic neuropathy)     developed post-op r/t hypotension    Osteomyelitis (HCC)     Left foot; finished Vanc. end of .    Palpitations 2015    TIA (transient ischemic attack)        Past Surgical History:   Procedure Laterality Date    BACK SURGERY       SECTION      CHOLECYSTECTOMY      ELBOW SURGERY Left     FOOT DEBRIDEMENT Left 2023    LEFT FOOT INCISION AND DEBRIDEMENT/ POSSIBLE GRAFT APPLICATION/ POSSIBLE WOUND VACUUM APPLICATION performed by Lynne Aguila DPM at [facility] OR    FOOT SURGERY Left 2014    INCISION AND DRAINAGE MID FOOT LEFT, ENDOSCOPIC GASTROC    FOOT SURGERY Left 2014    LEFT FOOT

## 2024-07-18 LAB
ESTIMATED AVERAGE GLUCOSE: NORMAL
HBA1C MFR BLD: 6.3 %

## 2024-07-29 ENCOUNTER — TELEMEDICINE (OUTPATIENT)
Dept: FAMILY MEDICINE CLINIC | Age: 64
End: 2024-07-29
Payer: COMMERCIAL

## 2024-07-29 DIAGNOSIS — Z09 HOSPITAL DISCHARGE FOLLOW-UP: ICD-10-CM

## 2024-07-29 DIAGNOSIS — I50.20 HFREF (HEART FAILURE WITH REDUCED EJECTION FRACTION) (HCC): Primary | ICD-10-CM

## 2024-07-29 DIAGNOSIS — L97.529 ULCER OF LEFT FOOT, UNSPECIFIED ULCER STAGE (HCC): ICD-10-CM

## 2024-07-29 DIAGNOSIS — I25.2 HISTORY OF ST ELEVATION MYOCARDIAL INFARCTION (STEMI): ICD-10-CM

## 2024-07-29 DIAGNOSIS — E11.621 DIABETIC ULCER OF TOE OF RIGHT FOOT ASSOCIATED WITH TYPE 2 DIABETES MELLITUS, WITH FAT LAYER EXPOSED (HCC): ICD-10-CM

## 2024-07-29 DIAGNOSIS — L03.119 CELLULITIS OF FOOT: ICD-10-CM

## 2024-07-29 DIAGNOSIS — L97.512 DIABETIC ULCER OF TOE OF RIGHT FOOT ASSOCIATED WITH TYPE 2 DIABETES MELLITUS, WITH FAT LAYER EXPOSED (HCC): ICD-10-CM

## 2024-07-29 DIAGNOSIS — I25.10 CORONARY ARTERY DISEASE INVOLVING NATIVE CORONARY ARTERY OF NATIVE HEART WITHOUT ANGINA PECTORIS: ICD-10-CM

## 2024-07-29 DIAGNOSIS — L02.91 PURULENT ABSCESS: ICD-10-CM

## 2024-07-29 PROCEDURE — 1111F DSCHRG MED/CURRENT MED MERGE: CPT | Performed by: STUDENT IN AN ORGANIZED HEALTH CARE EDUCATION/TRAINING PROGRAM

## 2024-07-29 PROCEDURE — 99214 OFFICE O/P EST MOD 30 MIN: CPT | Performed by: STUDENT IN AN ORGANIZED HEALTH CARE EDUCATION/TRAINING PROGRAM

## 2024-07-29 ASSESSMENT — ENCOUNTER SYMPTOMS
SHORTNESS OF BREATH: 0
ABDOMINAL PAIN: 0

## 2024-07-29 NOTE — PROGRESS NOTES
Denisha Gutierrez is a 63 y.o.female who presents with No chief complaint on file.            Patient presents for hospital follow-up.  She was admitted for Ankle Abscess    Discharge summary:  It was discussed with the patient that she has high risk for progression and need to obtain a BKA PICC ordered and started on Daptomycin which will continue for 6 weeks at home    Heart Failure, Unspecified Hf Chronicity, Unspecified Heart Failure Type ([facility])  Continue Lasix  Echo showed an EF of 20% with evidence of BiV failure. We discussed this in detail including GDMT and further ischemic w/u-at this point she does NOT want to pursue any additional treatments. She understands the risks but reports she knows she may die but is not afraid. She is a DNR and all of her decisions re: her care have been consistent. Agreeable to low dose Zebeta, PO Lasix.  Cardiology has previously seen  Paroxysmal Atrial Fibrillation ([facility])  NSR on exam  Refuses ALL anticoagulation except for ASA  Rate still high but most she would agree to is low dose Zebeta which she tolerated well  Left Atrial Thrombus (Resolved)  TTE pending  Even if positive, she will not take any anticoagulants given previous side effects-I reviewed ALL of them with her including heparin in the hospital and Lovenox.  Patient discharged on bisoprolol     Patient advised to follow-up outpatient with cardiology, referral placed   Podiatry following up on the 5th             Review of Systems   Constitutional:  Negative for fatigue.   Eyes:  Negative for visual disturbance.   Respiratory:  Negative for shortness of breath.    Cardiovascular:  Negative for chest pain.   Gastrointestinal:  Negative for abdominal pain.   Skin:  Negative for rash.   Neurological:  Negative for dizziness, light-headedness and headaches.        Past Medical History:   Diagnosis Date    Arthritis     Asthma     exercise/cold induced    Blindness, legal     CVA (cerebral vascular accident) ([facility])

## 2024-08-06 NOTE — TELEPHONE ENCOUNTER
MEDICATIONS  (STANDING):  acetaminophen     Tablet .. 975 milliGRAM(s) Oral every 6 hours  heparin   Injectable 5000 Unit(s) SubCutaneous every 8 hours  ibuprofen  Tablet. 600 milliGRAM(s) Oral every 6 hours  lactated ringers. 1000 milliLiter(s) (125 mL/Hr) IV Continuous <Continuous>  senna 2 Tablet(s) Oral at bedtime  simethicone 80 milliGRAM(s) Chew every 6 hours    MEDICATIONS  (PRN):  ondansetron Injectable 4 milliGRAM(s) IV Push every 4 hours PRN Nausea and/or Vomiting  oxyCODONE    IR 5 milliGRAM(s) Oral every 6 hours PRN Moderate Pain (4 - 6)  oxyCODONE    IR 10 milliGRAM(s) Oral every 6 hours PRN Severe Pain (7 - 10)   Received call from Gaurav Becker,  of [facility]. He states patient admitted to [facility]. Nurse last night had difficulty placing wound vac so it is to be placed today per wound care RN at facility.

## 2024-08-08 ENCOUNTER — TELEPHONE (OUTPATIENT)
Dept: FAMILY MEDICINE CLINIC | Age: 64
End: 2024-08-08

## 2024-08-08 NOTE — TELEPHONE ENCOUNTER
Pt's home health care nurse called [facility] stated pt's BP was 135/98 and they have to pass it along to the pt's PCP when its an alarming reading. She stated all her other vitals were good.     [facility]   [phone]

## 2024-08-27 RX ORDER — POTASSIUM BICARBONATE 977.5 MG/1
25 TABLET, EFFERVESCENT ORAL DAILY
Qty: 30 TABLET | Refills: 0 | Status: SHIPPED | OUTPATIENT
Start: 2024-08-27

## 2024-10-14 ENCOUNTER — TELEPHONE (OUTPATIENT)
Dept: FAMILY MEDICINE CLINIC | Age: 64
End: 2024-10-14

## (undated) DEVICE — SOLUTION IRRIG 3000ML 0.9% SOD CHL USP UROMATIC PLAS CONT

## (undated) DEVICE — Device

## (undated) DEVICE — HANDPIECE SUCTION TUBING INTERPULSE 10FT

## (undated) DEVICE — TOWEL,STOP FLAG GOLD N-W: Brand: MEDLINE

## (undated) DEVICE — COVER LT HNDL BLU PLAS

## (undated) DEVICE — SOLUTION IV 1000ML 0.9% SOD CHL

## (undated) DEVICE — GLOVE ORANGE PI 7 1/2   MSG9075

## (undated) DEVICE — PODIATRY: Brand: MEDLINE INDUSTRIES, INC.

## (undated) DEVICE — TOWEL,OR,DSP,ST,BLUE,DLX,8/PK,10PK/CS: Brand: MEDLINE

## (undated) DEVICE — COAXIAL HIGH FLOW TIP WITH SOFT SHIELD

## (undated) DEVICE — PREMIUM WET SKIN PREP TRAY: Brand: MEDLINE INDUSTRIES, INC.

## (undated) DEVICE — GAUZE,PACKING STRIP,IODOFORM,1/2"X5YD,ST: Brand: CURAD

## (undated) DEVICE — UNDERGLOVE SURG SZ 8 BLU LTX FREE SYN POLYISOPRENE POLYMER